# Patient Record
Sex: FEMALE | Race: WHITE | NOT HISPANIC OR LATINO | URBAN - METROPOLITAN AREA
[De-identification: names, ages, dates, MRNs, and addresses within clinical notes are randomized per-mention and may not be internally consistent; named-entity substitution may affect disease eponyms.]

---

## 2017-06-28 ENCOUNTER — OUTPATIENT (OUTPATIENT)
Dept: OUTPATIENT SERVICES | Facility: HOSPITAL | Age: 77
LOS: 1 days | Discharge: HOME | End: 2017-06-28

## 2017-06-28 DIAGNOSIS — Z02.9 ENCOUNTER FOR ADMINISTRATIVE EXAMINATIONS, UNSPECIFIED: ICD-10-CM

## 2017-06-28 DIAGNOSIS — N17.9 ACUTE KIDNEY FAILURE, UNSPECIFIED: ICD-10-CM

## 2017-06-28 DIAGNOSIS — E86.0 DEHYDRATION: ICD-10-CM

## 2017-06-28 DIAGNOSIS — I12.9 HYPERTENSIVE CHRONIC KIDNEY DISEASE WITH STAGE 1 THROUGH STAGE 4 CHRONIC KIDNEY DISEASE, OR UNSPECIFIED CHRONIC KIDNEY DISEASE: ICD-10-CM

## 2017-06-28 DIAGNOSIS — R33.9 RETENTION OF URINE, UNSPECIFIED: ICD-10-CM

## 2018-02-28 ENCOUNTER — TRANSCRIPTION ENCOUNTER (OUTPATIENT)
Age: 78
End: 2018-02-28

## 2018-06-08 ENCOUNTER — EMERGENCY (EMERGENCY)
Facility: HOSPITAL | Age: 78
LOS: 0 days | Discharge: HOME | End: 2018-06-08
Attending: EMERGENCY MEDICINE | Admitting: EMERGENCY MEDICINE

## 2018-06-08 VITALS
DIASTOLIC BLOOD PRESSURE: 82 MMHG | HEART RATE: 78 BPM | SYSTOLIC BLOOD PRESSURE: 183 MMHG | TEMPERATURE: 98 F | RESPIRATION RATE: 17 BRPM | OXYGEN SATURATION: 99 % | WEIGHT: 179.9 LBS | HEIGHT: 66 IN

## 2018-06-08 VITALS
OXYGEN SATURATION: 99 % | HEART RATE: 67 BPM | TEMPERATURE: 97 F | SYSTOLIC BLOOD PRESSURE: 196 MMHG | DIASTOLIC BLOOD PRESSURE: 77 MMHG | RESPIRATION RATE: 18 BRPM

## 2018-06-08 DIAGNOSIS — R30.0 DYSURIA: ICD-10-CM

## 2018-06-08 DIAGNOSIS — I10 ESSENTIAL (PRIMARY) HYPERTENSION: ICD-10-CM

## 2018-06-08 DIAGNOSIS — Z79.82 LONG TERM (CURRENT) USE OF ASPIRIN: ICD-10-CM

## 2018-06-08 DIAGNOSIS — Z98.890 OTHER SPECIFIED POSTPROCEDURAL STATES: ICD-10-CM

## 2018-06-08 DIAGNOSIS — R33.9 RETENTION OF URINE, UNSPECIFIED: ICD-10-CM

## 2018-06-08 DIAGNOSIS — Z79.899 OTHER LONG TERM (CURRENT) DRUG THERAPY: ICD-10-CM

## 2018-06-08 DIAGNOSIS — Z88.0 ALLERGY STATUS TO PENICILLIN: ICD-10-CM

## 2018-06-08 LAB
ALBUMIN SERPL ELPH-MCNC: 3.3 G/DL — LOW (ref 3.5–5.2)
ALP SERPL-CCNC: 93 U/L — SIGNIFICANT CHANGE UP (ref 30–115)
ALT FLD-CCNC: 8 U/L — SIGNIFICANT CHANGE UP (ref 0–41)
ANION GAP SERPL CALC-SCNC: 13 MMOL/L — SIGNIFICANT CHANGE UP (ref 7–14)
APPEARANCE UR: CLEAR — SIGNIFICANT CHANGE UP
AST SERPL-CCNC: 13 U/L — SIGNIFICANT CHANGE UP (ref 0–41)
BACTERIA # UR AUTO: (no result)
BASOPHILS # BLD AUTO: 0.03 K/UL — SIGNIFICANT CHANGE UP (ref 0–0.2)
BASOPHILS NFR BLD AUTO: 0.4 % — SIGNIFICANT CHANGE UP (ref 0–1)
BILIRUB SERPL-MCNC: <0.2 MG/DL — SIGNIFICANT CHANGE UP (ref 0.2–1.2)
BILIRUB UR-MCNC: NEGATIVE — SIGNIFICANT CHANGE UP
BUN SERPL-MCNC: 43 MG/DL — HIGH (ref 10–20)
CALCIUM SERPL-MCNC: 8.6 MG/DL — SIGNIFICANT CHANGE UP (ref 8.5–10.1)
CHLORIDE SERPL-SCNC: 101 MMOL/L — SIGNIFICANT CHANGE UP (ref 98–110)
CO2 SERPL-SCNC: 24 MMOL/L — SIGNIFICANT CHANGE UP (ref 17–32)
COD CRY URNS QL: NEGATIVE — SIGNIFICANT CHANGE UP
COLOR SPEC: YELLOW — SIGNIFICANT CHANGE UP
CREAT SERPL-MCNC: 1.3 MG/DL — SIGNIFICANT CHANGE UP (ref 0.7–1.5)
DIFF PNL FLD: (no result)
EOSINOPHIL # BLD AUTO: 0.12 K/UL — SIGNIFICANT CHANGE UP (ref 0–0.7)
EOSINOPHIL NFR BLD AUTO: 1.5 % — SIGNIFICANT CHANGE UP (ref 0–8)
EPI CELLS # UR: (no result) /HPF
GLUCOSE SERPL-MCNC: 111 MG/DL — HIGH (ref 70–99)
GLUCOSE UR QL: NEGATIVE MG/DL — SIGNIFICANT CHANGE UP
GRAN CASTS # UR COMP ASSIST: NEGATIVE — SIGNIFICANT CHANGE UP
HCT VFR BLD CALC: 32.6 % — LOW (ref 37–47)
HGB BLD-MCNC: 10.4 G/DL — LOW (ref 12–16)
HYALINE CASTS # UR AUTO: NEGATIVE — SIGNIFICANT CHANGE UP
IMM GRANULOCYTES NFR BLD AUTO: 0.2 % — SIGNIFICANT CHANGE UP (ref 0.1–0.3)
KETONES UR-MCNC: NEGATIVE — SIGNIFICANT CHANGE UP
LEUKOCYTE ESTERASE UR-ACNC: NEGATIVE — SIGNIFICANT CHANGE UP
LYMPHOCYTES # BLD AUTO: 1.36 K/UL — SIGNIFICANT CHANGE UP (ref 1.2–3.4)
LYMPHOCYTES # BLD AUTO: 16.9 % — LOW (ref 20.5–51.1)
MCHC RBC-ENTMCNC: 29.6 PG — SIGNIFICANT CHANGE UP (ref 27–31)
MCHC RBC-ENTMCNC: 31.9 G/DL — LOW (ref 32–37)
MCV RBC AUTO: 92.9 FL — SIGNIFICANT CHANGE UP (ref 81–99)
MONOCYTES # BLD AUTO: 0.79 K/UL — HIGH (ref 0.1–0.6)
MONOCYTES NFR BLD AUTO: 9.8 % — HIGH (ref 1.7–9.3)
NEUTROPHILS # BLD AUTO: 5.72 K/UL — SIGNIFICANT CHANGE UP (ref 1.4–6.5)
NEUTROPHILS NFR BLD AUTO: 71.2 % — SIGNIFICANT CHANGE UP (ref 42.2–75.2)
NITRITE UR-MCNC: NEGATIVE — SIGNIFICANT CHANGE UP
NRBC # BLD: 0 /100 WBCS — SIGNIFICANT CHANGE UP (ref 0–0)
PH UR: 6.5 — SIGNIFICANT CHANGE UP (ref 5–8)
PLATELET # BLD AUTO: 336 K/UL — SIGNIFICANT CHANGE UP (ref 130–400)
POTASSIUM SERPL-MCNC: 3.7 MMOL/L — SIGNIFICANT CHANGE UP (ref 3.5–5)
POTASSIUM SERPL-SCNC: 3.7 MMOL/L — SIGNIFICANT CHANGE UP (ref 3.5–5)
PROT SERPL-MCNC: 6.3 G/DL — SIGNIFICANT CHANGE UP (ref 6–8)
PROT UR-MCNC: NEGATIVE MG/DL — SIGNIFICANT CHANGE UP
RBC # BLD: 3.51 M/UL — LOW (ref 4.2–5.4)
RBC # FLD: 12.8 % — SIGNIFICANT CHANGE UP (ref 11.5–14.5)
RBC CASTS # UR COMP ASSIST: SIGNIFICANT CHANGE UP /HPF
SODIUM SERPL-SCNC: 138 MMOL/L — SIGNIFICANT CHANGE UP (ref 135–146)
SP GR SPEC: <=1.005 — SIGNIFICANT CHANGE UP (ref 1.01–1.03)
TRI-PHOS CRY UR QL COMP ASSIST: NEGATIVE — SIGNIFICANT CHANGE UP
URATE CRY FLD QL MICRO: NEGATIVE — SIGNIFICANT CHANGE UP
UROBILINOGEN FLD QL: 0.2 MG/DL — SIGNIFICANT CHANGE UP (ref 0.2–0.2)
WBC # BLD: 8.04 K/UL — SIGNIFICANT CHANGE UP (ref 4.8–10.8)
WBC # FLD AUTO: 8.04 K/UL — SIGNIFICANT CHANGE UP (ref 4.8–10.8)
WBC UR QL: NEGATIVE — SIGNIFICANT CHANGE UP

## 2018-06-08 RX ORDER — SODIUM CHLORIDE 9 MG/ML
3 INJECTION INTRAMUSCULAR; INTRAVENOUS; SUBCUTANEOUS EVERY 8 HOURS
Qty: 0 | Refills: 0 | Status: DISCONTINUED | OUTPATIENT
Start: 2018-06-08 | End: 2018-06-08

## 2018-06-08 NOTE — ED ADULT NURSE NOTE - PMH
H/O knee surgery    History of hip surgery    Hypertension    Mitral valve disorder    Other irritable bowel syndrome

## 2018-06-08 NOTE — ED PROVIDER NOTE - OBJECTIVE STATEMENT
urinary retention today, Was having trouble urinating several days ago, Given med by PMD help for several day ,Now unable to urinate again, No fever, no back pain, no bowel trouble,. No new meds, Had same problem several years ago and treated with brennan for 2 days an Sx resolved.

## 2018-06-08 NOTE — ED PROVIDER NOTE - NEUROLOGICAL, MLM
Alert and oriented, no focal deficits, no motor or sensory deficits. strength equal bilateral, good rectal tone

## 2018-06-09 LAB
CULTURE RESULTS: NO GROWTH — SIGNIFICANT CHANGE UP
SPECIMEN SOURCE: SIGNIFICANT CHANGE UP

## 2018-11-19 ENCOUNTER — INPATIENT (INPATIENT)
Facility: HOSPITAL | Age: 78
LOS: 6 days | Discharge: NURSING, REHAB OR LONG-TERM CARE | End: 2018-11-26
Attending: ORTHOPAEDIC SURGERY | Admitting: ORTHOPAEDIC SURGERY
Payer: MEDICARE

## 2018-11-19 VITALS
DIASTOLIC BLOOD PRESSURE: 98 MMHG | TEMPERATURE: 97 F | RESPIRATION RATE: 18 BRPM | SYSTOLIC BLOOD PRESSURE: 178 MMHG | OXYGEN SATURATION: 98 % | HEART RATE: 76 BPM

## 2018-11-19 PROBLEM — Z98.890 OTHER SPECIFIED POSTPROCEDURAL STATES: Chronic | Status: ACTIVE | Noted: 2018-06-08

## 2018-11-19 PROBLEM — I10 ESSENTIAL (PRIMARY) HYPERTENSION: Chronic | Status: ACTIVE | Noted: 2018-06-08

## 2018-11-19 PROBLEM — K58.8 OTHER IRRITABLE BOWEL SYNDROME: Chronic | Status: ACTIVE | Noted: 2018-06-08

## 2018-11-19 PROBLEM — I05.9 RHEUMATIC MITRAL VALVE DISEASE, UNSPECIFIED: Chronic | Status: ACTIVE | Noted: 2018-06-08

## 2018-11-19 LAB
ALBUMIN SERPL ELPH-MCNC: 3.6 G/DL — SIGNIFICANT CHANGE UP (ref 3.5–5.2)
ALP SERPL-CCNC: 83 U/L — SIGNIFICANT CHANGE UP (ref 30–115)
ALT FLD-CCNC: 9 U/L — SIGNIFICANT CHANGE UP (ref 0–41)
ANION GAP SERPL CALC-SCNC: 16 MMOL/L — HIGH (ref 7–14)
APTT BLD: 35 SEC — SIGNIFICANT CHANGE UP (ref 27–39.2)
AST SERPL-CCNC: 15 U/L — SIGNIFICANT CHANGE UP (ref 0–41)
BASOPHILS # BLD AUTO: 0.06 K/UL — SIGNIFICANT CHANGE UP (ref 0–0.2)
BASOPHILS NFR BLD AUTO: 0.5 % — SIGNIFICANT CHANGE UP (ref 0–1)
BILIRUB SERPL-MCNC: 0.3 MG/DL — SIGNIFICANT CHANGE UP (ref 0.2–1.2)
BLD GP AB SCN SERPL QL: SIGNIFICANT CHANGE UP
BUN SERPL-MCNC: 27 MG/DL — HIGH (ref 10–20)
CALCIUM SERPL-MCNC: 9 MG/DL — SIGNIFICANT CHANGE UP (ref 8.5–10.1)
CHLORIDE SERPL-SCNC: 100 MMOL/L — SIGNIFICANT CHANGE UP (ref 98–110)
CO2 SERPL-SCNC: 24 MMOL/L — SIGNIFICANT CHANGE UP (ref 17–32)
CREAT SERPL-MCNC: 1 MG/DL — SIGNIFICANT CHANGE UP (ref 0.7–1.5)
EOSINOPHIL # BLD AUTO: 0.06 K/UL — SIGNIFICANT CHANGE UP (ref 0–0.7)
EOSINOPHIL NFR BLD AUTO: 0.5 % — SIGNIFICANT CHANGE UP (ref 0–8)
GLUCOSE SERPL-MCNC: 115 MG/DL — HIGH (ref 70–99)
HCT VFR BLD CALC: 36 % — LOW (ref 37–47)
HGB BLD-MCNC: 11.1 G/DL — LOW (ref 12–16)
IMM GRANULOCYTES NFR BLD AUTO: 0.5 % — HIGH (ref 0.1–0.3)
INR BLD: 1.01 RATIO — SIGNIFICANT CHANGE UP (ref 0.65–1.3)
LYMPHOCYTES # BLD AUTO: 1.24 K/UL — SIGNIFICANT CHANGE UP (ref 1.2–3.4)
LYMPHOCYTES # BLD AUTO: 10.2 % — LOW (ref 20.5–51.1)
MCHC RBC-ENTMCNC: 28.3 PG — SIGNIFICANT CHANGE UP (ref 27–31)
MCHC RBC-ENTMCNC: 30.8 G/DL — LOW (ref 32–37)
MCV RBC AUTO: 91.8 FL — SIGNIFICANT CHANGE UP (ref 81–99)
MONOCYTES # BLD AUTO: 0.6 K/UL — SIGNIFICANT CHANGE UP (ref 0.1–0.6)
MONOCYTES NFR BLD AUTO: 4.9 % — SIGNIFICANT CHANGE UP (ref 1.7–9.3)
NEUTROPHILS # BLD AUTO: 10.11 K/UL — HIGH (ref 1.4–6.5)
NEUTROPHILS NFR BLD AUTO: 83.4 % — HIGH (ref 42.2–75.2)
NRBC # BLD: 0 /100 WBCS — SIGNIFICANT CHANGE UP (ref 0–0)
NT-PROBNP SERPL-SCNC: 2025 PG/ML — HIGH (ref 0–300)
PLATELET # BLD AUTO: 395 K/UL — SIGNIFICANT CHANGE UP (ref 130–400)
POTASSIUM SERPL-MCNC: 4.2 MMOL/L — SIGNIFICANT CHANGE UP (ref 3.5–5)
POTASSIUM SERPL-SCNC: 4.2 MMOL/L — SIGNIFICANT CHANGE UP (ref 3.5–5)
PROT SERPL-MCNC: 7.5 G/DL — SIGNIFICANT CHANGE UP (ref 6–8)
PROTHROM AB SERPL-ACNC: 11.6 SEC — SIGNIFICANT CHANGE UP (ref 9.95–12.87)
RBC # BLD: 3.92 M/UL — LOW (ref 4.2–5.4)
RBC # FLD: 13.4 % — SIGNIFICANT CHANGE UP (ref 11.5–14.5)
SODIUM SERPL-SCNC: 140 MMOL/L — SIGNIFICANT CHANGE UP (ref 135–146)
TYPE + AB SCN PNL BLD: SIGNIFICANT CHANGE UP
WBC # BLD: 12.13 K/UL — HIGH (ref 4.8–10.8)
WBC # FLD AUTO: 12.13 K/UL — HIGH (ref 4.8–10.8)

## 2018-11-19 PROCEDURE — 99222 1ST HOSP IP/OBS MODERATE 55: CPT

## 2018-11-19 RX ORDER — MORPHINE SULFATE 50 MG/1
4 CAPSULE, EXTENDED RELEASE ORAL ONCE
Qty: 0 | Refills: 0 | Status: DISCONTINUED | OUTPATIENT
Start: 2018-11-19 | End: 2018-11-19

## 2018-11-19 RX ORDER — HEPARIN SODIUM 5000 [USP'U]/ML
5000 INJECTION INTRAVENOUS; SUBCUTANEOUS EVERY 8 HOURS
Qty: 0 | Refills: 0 | Status: DISCONTINUED | OUTPATIENT
Start: 2018-11-19 | End: 2018-11-21

## 2018-11-19 RX ORDER — PANTOPRAZOLE SODIUM 20 MG/1
40 TABLET, DELAYED RELEASE ORAL DAILY
Qty: 0 | Refills: 0 | Status: DISCONTINUED | OUTPATIENT
Start: 2018-11-19 | End: 2018-11-21

## 2018-11-19 RX ORDER — LOSARTAN POTASSIUM 100 MG/1
50 TABLET, FILM COATED ORAL DAILY
Qty: 0 | Refills: 0 | Status: DISCONTINUED | OUTPATIENT
Start: 2018-11-19 | End: 2018-11-21

## 2018-11-19 RX ORDER — MORPHINE SULFATE 50 MG/1
2 CAPSULE, EXTENDED RELEASE ORAL EVERY 4 HOURS
Qty: 0 | Refills: 0 | Status: DISCONTINUED | OUTPATIENT
Start: 2018-11-19 | End: 2018-11-21

## 2018-11-19 RX ORDER — ACETAMINOPHEN 500 MG
650 TABLET ORAL ONCE
Qty: 0 | Refills: 0 | Status: COMPLETED | OUTPATIENT
Start: 2018-11-19 | End: 2018-11-19

## 2018-11-19 RX ORDER — IBUPROFEN 200 MG
600 TABLET ORAL EVERY 6 HOURS
Qty: 0 | Refills: 0 | Status: DISCONTINUED | OUTPATIENT
Start: 2018-11-19 | End: 2018-11-21

## 2018-11-19 RX ORDER — SODIUM CHLORIDE 9 MG/ML
1000 INJECTION INTRAMUSCULAR; INTRAVENOUS; SUBCUTANEOUS
Qty: 0 | Refills: 0 | Status: DISCONTINUED | OUTPATIENT
Start: 2018-11-19 | End: 2018-11-21

## 2018-11-19 RX ORDER — ACETAMINOPHEN 500 MG
650 TABLET ORAL EVERY 6 HOURS
Qty: 0 | Refills: 0 | Status: DISCONTINUED | OUTPATIENT
Start: 2018-11-19 | End: 2018-11-21

## 2018-11-19 RX ORDER — ATENOLOL 25 MG/1
50 TABLET ORAL DAILY
Qty: 0 | Refills: 0 | Status: DISCONTINUED | OUTPATIENT
Start: 2018-11-19 | End: 2018-11-21

## 2018-11-19 RX ORDER — LOSARTAN POTASSIUM 100 MG/1
1 TABLET, FILM COATED ORAL
Qty: 0 | Refills: 0 | COMMUNITY

## 2018-11-19 RX ORDER — HYDROCHLOROTHIAZIDE 25 MG
12.5 TABLET ORAL DAILY
Qty: 0 | Refills: 0 | Status: DISCONTINUED | OUTPATIENT
Start: 2018-11-19 | End: 2018-11-21

## 2018-11-19 RX ADMIN — MORPHINE SULFATE 4 MILLIGRAM(S): 50 CAPSULE, EXTENDED RELEASE ORAL at 13:14

## 2018-11-19 RX ADMIN — MORPHINE SULFATE 4 MILLIGRAM(S): 50 CAPSULE, EXTENDED RELEASE ORAL at 14:00

## 2018-11-19 RX ADMIN — MORPHINE SULFATE 4 MILLIGRAM(S): 50 CAPSULE, EXTENDED RELEASE ORAL at 17:06

## 2018-11-19 RX ADMIN — MORPHINE SULFATE 2 MILLIGRAM(S): 50 CAPSULE, EXTENDED RELEASE ORAL at 21:54

## 2018-11-19 RX ADMIN — SODIUM CHLORIDE 100 MILLILITER(S): 9 INJECTION INTRAMUSCULAR; INTRAVENOUS; SUBCUTANEOUS at 18:19

## 2018-11-19 RX ADMIN — HEPARIN SODIUM 5000 UNIT(S): 5000 INJECTION INTRAVENOUS; SUBCUTANEOUS at 21:00

## 2018-11-19 RX ADMIN — MORPHINE SULFATE 4 MILLIGRAM(S): 50 CAPSULE, EXTENDED RELEASE ORAL at 16:25

## 2018-11-19 RX ADMIN — Medication 650 MILLIGRAM(S): at 13:14

## 2018-11-19 RX ADMIN — Medication 650 MILLIGRAM(S): at 14:00

## 2018-11-19 NOTE — H&P ADULT - HISTORY OF PRESENT ILLNESS
79 yo F s/p fall from standing (tripped and fell) on R side, hit her R knee. No head injury, no LOC. Presented with stable vitals. R knee swollen.    PMH/PSH: b/l knee replacement, R hip replacement  ALL: PCN  Meds: ASA, losartan, chlorthalidone, atenolol, HCTZ

## 2018-11-19 NOTE — ED PROVIDER NOTE - OBJECTIVE STATEMENT
78 year old female with hx of bilateral TKA's and DEMARCUS, hypertension presenting with fall 1 hour ago. patient states she was bending over in the [employer] store when she lost her balance and fall onto her right knee. She denies chest pain, syncope, abdominal pain or headache before fall. She denies LOC, neck pain, head trauma, or the use of bloodthinners besides baby aspirin, She was brought in by ambulance with right knee splinted. Patient denies numbness, tingling, or pops when she fell. She denies other area of injury. She states she has pain above her knee and on her kneecap. She fractured her femur on the right side 2 years ago. Ortho is Dr. Dominguez. Denies hip pain or ankle pain.

## 2018-11-19 NOTE — ED PROVIDER NOTE - ATTENDING CONTRIBUTION TO CARE
78y F PMH HTN on daily ASA, prior total knee replacement, hip' replacement on right, s/p prior femur fracture, pw fall onto right knee today, with swelling, BIBEMS, no other injury in the fall. pain and swelling only to knee.   Exam: NAD, NCAT, HEENT: mmm, EOMI, PERRLA, Neck: supple, nontender, nl ROM, Heart: RRR, no murmur, Lungs: BCTA, no signs of increased WOB, Abd: NTND, no guarding or rebound, no hernia palpated, no CVAT. MSK: Right knee very swollen, ecchymotic, tender throughout proximal aspect of knee. right hip nontender, though moving leg through ROM is limited by knee area pain. Otherwise chest, back, and ext nontender, nl rom, no deformity b/l DP and PT pulses 2+ and equal. Neuro: A&Ox3, normal strength, nl sensation throughout, normal speech.   A/P: Eval for trauma sustained in fall. Pain control. Distal extremity with CMS intact at this time. Consult ortho and trauma as needed.

## 2018-11-19 NOTE — ED PROVIDER NOTE - PHYSICAL EXAMINATION
Physical Exam    Vital Signs: I have reviewed the initial vital signs.  Constitutional: well-nourished, appears stated age, no acute distress  HEENT: Conjunctiva pink, Sclera clear, PERRLA, EOMI. Mucous membranes moist, no exudates or lesions noted, uvula midline. No trismus or drooling. Non-tender lymph nodes  Cardiovascular: S1 and S2 present, regular rate, regular rhythm. radial pulses equal and 2+  Respiratory: unlabored respiratory effort, clear to auscultation bilaterally no wheezing, rales and rhonchi  Gastrointestinal: soft, non-tender abdomen, no pulsatile mass, active bowel sounds in all 4 quadrants. No guarding or rebound tenderness  Musculoskeletal: supple nontender neck, no midline tenderness. Right knee diffusely swollen with ecchymosis noted over kneecap. No TTP in lateral or medial compartment. TTP along patella and superior to knee in distal femur, Patient unable to extend of flex knee d/t pain. Ligaments intact. No TTP in ankle 5/5 strength. No hip pain with compression. Sensation intact in LE's, DP 2+  Integumentary: warm, dry, no rash  Neurologic: A & O x 3,, cranial nerves II-XII grossly intact, extremities’ motor and sensory functions grossly intact  Psychiatric: appropriate mood, appropriate affect

## 2018-11-19 NOTE — H&P ADULT - NSHPLABSRESULTS_GEN_ALL_CORE
11.1   12.13 )-----------( 395      ( 19 Nov 2018 12:12 )             36.0   11-19    140  |  100  |  27<H>  ----------------------------<  115<H>  4.2   |  24  |  1.0    Ca    9.0      19 Nov 2018 12:12    TPro  7.5  /  Alb  3.6  /  TBili  0.3  /  DBili  x   /  AST  15  /  ALT  9   /  AlkPhos  83  11-19    CXR, pelvis - no acute traumatic injuries  R knee xray -  displaced and impacted periprosthetic fracture of distal femur  R femur xray - distal femur fracture

## 2018-11-19 NOTE — H&P ADULT - ASSESSMENT
79 yo F with isolated distal periprosthetic femur fracture    Plan  Admit to surgery  NPO  IVF /h  Pain control  Hep sq  R LE arterial duplex  Plan for OR tonight with Ortho  Discussed with ED and Dr. Magdaleno

## 2018-11-19 NOTE — ED ADULT NURSE NOTE - OBJECTIVE STATEMENT
pt aox4 complaining of pain to her right leg, reports having mechanical fall at [facility] [place] today.  fell onto her right knee. bruising noted to right knee, swelling noted to right lower extremity.  pt denies dizziness before falling. denies medical problems. waiting for xray. will continue to monitor/assess

## 2018-11-19 NOTE — CONSULT NOTE ADULT - SUBJECTIVE AND OBJECTIVE BOX
78 f presents with right distal femur periprosthetic fx s/p fall today. Pt had b/l knee replacement, right knee was done in 2003 by dr Dominguez.      PAST MEDICAL & SURGICAL HISTORY:  Mitral valve disorder  Other irritable bowel syndrome  H/O knee surgery  History of hip surgery  Hypertension  No significant past surgical history      Home Medications:  Aspir 81 oral delayed release tablet: 1 tab(s) orally once a day (08 Jun 2018 14:46)  atenolol-chlorthalidone 50 mg-25 mg oral tablet: 1 tab(s) orally once a day (08 Jun 2018 14:46)  losartan-hydrochlorothiazide 50mg-12.5mg oral tablet: 1 tab(s) orally once a day (08 Jun 2018 14:46)      MEDICATIONS  (STANDING):  acetaminophen   Tablet .. 650 milliGRAM(s) Oral once  morphine  - Injectable 4 milliGRAM(s) IV Push Once    MEDICATIONS  (PRN):      Allergies    penicillin (Rash)    Intolerances        ICU Vital Signs Last 24 Hrs  T(C): 36.3 (19 Nov 2018 10:44), Max: 36.3 (19 Nov 2018 10:44)  T(F): 97.3 (19 Nov 2018 10:44), Max: 97.3 (19 Nov 2018 10:44)  HR: 76 (19 Nov 2018 10:44) (76 - 76)  BP: 178/98 (19 Nov 2018 10:44) (178/98 - 178/98)  BP(mean): --  ABP: --  ABP(mean): --  RR: 18 (19 Nov 2018 10:44) (18 - 18)  SpO2: 98% (19 Nov 2018 10:44) (98% - 98%)                              11.1   12.13 )-----------( 395      ( 19 Nov 2018 12:12 )             36.0             PT/INR - ( 19 Nov 2018 12:12 )   PT: 11.60 sec;   INR: 1.01 ratio           < from: Xray Knee 4 Views, Right (11.19.18 @ 12:10) >    Findings:  There is an acute oblique fracture through the distal femur just above   the femoral component of the knee prosthesis, with impaction and   approximately 3 cm posterior displacement. No additional fractures seen.   Bones are osteopenic.    Impression:  Displaced and impacted periprosthetic fracture of the distal femur as   above.    < end of copied text > 78 f presents with right distal femur periprosthetic fx s/p fall today. Pt had b/l knee replacement, right knee was done in 2003 by dr Dominguez. H/o right hip replacement, right proximal femur fx 2 years ago that was treated conservatively.      PAST MEDICAL & SURGICAL HISTORY:  Mitral valve disorder  Other irritable bowel syndrome  H/O knee surgery  History of hip surgery  Hypertension  No significant past surgical history      Home Medications:  Aspir 81 oral delayed release tablet: 1 tab(s) orally once a day (08 Jun 2018 14:46)  atenolol-chlorthalidone 50 mg-25 mg oral tablet: 1 tab(s) orally once a day (08 Jun 2018 14:46)  losartan-hydrochlorothiazide 50mg-12.5mg oral tablet: 1 tab(s) orally once a day (08 Jun 2018 14:46)      MEDICATIONS  (STANDING):  acetaminophen   Tablet .. 650 milliGRAM(s) Oral once  morphine  - Injectable 4 milliGRAM(s) IV Push Once    MEDICATIONS  (PRN):      Allergies    penicillin (Rash)    Intolerances      Pt seen and examined at bedside    NAD, A&O x3      ICU Vital Signs Last 24 Hrs  T(C): 36.3 (19 Nov 2018 10:44), Max: 36.3 (19 Nov 2018 10:44)  T(F): 97.3 (19 Nov 2018 10:44), Max: 97.3 (19 Nov 2018 10:44)  HR: 76 (19 Nov 2018 10:44) (76 - 76)  BP: 178/98 (19 Nov 2018 10:44) (178/98 - 178/98)  BP(mean): --  ABP: --  ABP(mean): --  RR: 18 (19 Nov 2018 10:44) (18 - 18)  SpO2: 98% (19 Nov 2018 10:44) (98% - 98%)      PE: right knee swelling, ecchymosis, tenderness, ankle/foot- motor function intact, sensation intact, foot warm, well perfused. Bulky Lopez dressing, knee immobilizer applied.                            11.1   12.13 )-----------( 395      ( 19 Nov 2018 12:12 )             36.0    11-19    140  |  100  |  27<H>  ----------------------------<  115<H>  4.2   |  24  |  1.0    Ca    9.0      19 Nov 2018 12:12    TPro  7.5  /  Alb  3.6  /  TBili  0.3  /  DBili  x   /  AST  15  /  ALT  9   /  AlkPhos  83  11-19       PT/INR - ( 19 Nov 2018 12:12 )   PT: 11.60 sec;   INR: 1.01 ratio           < from: Xray Knee 4 Views, Right (11.19.18 @ 12:10) >    Findings:  There is an acute oblique fracture through the distal femur just above   the femoral component of the knee prosthesis, with impaction and   approximately 3 cm posterior displacement. No additional fractures seen.   Bones are osteopenic.    Impression:  Displaced and impacted periprosthetic fracture of the distal femur as   above.    < end of copied text >

## 2018-11-19 NOTE — CONSULT NOTE ADULT - SUBJECTIVE AND OBJECTIVE BOX
Pt seen and examined at bed side.      Planned Procedure_ORIF______________________________________    Does the patient have the following conditions? Type Y or N    ____DVT/PE           	  Currently anticoagulated ______ IVC Filter _______ Date:______    ____DM                             Controlled    Y _______ N _______    ____HTN	                              Controlled    Y _______ N _______    ____CAD	                                  Prior MI  _____CABG _____STENT  ______ EF _____    ____CHF                             Chronic _____ Acute _____ EF ______    ____Afib	                                  Current Rhythm _________   Current anticoagulation _________    ____PPM/ICD                         Indication ___________  last Interrogated _________    ____OSA	                              PAP  Type &Settings _____________    ____Asthma/COPD	          Last Exac _______ Last Steroid use Date _______     ____O2 dependent	          Reason ______________    ____CKD or ESTEFANY	                  Stable Y _____  N ______    ____Electrolyte Abn	          Type ___________     Stable Y ______   N _______    ____Cirrhosis	                  Cause __________     Stable Y ______   N _______    ____GI Bleed                          Cause __________     Stable Y ______   N _______    ____Anemia	                          Cause __________     Stable Y ______   N _______    ____Transfusion                  Last date ________      ____ETOH Use	                  Last date________     Intervention __________________________    ____Tobacco Abuse	          Last date ________    Intervention __________________________    ____Drug Use	                 Type____________  Last dose _____ Intervention______________    ____Other                             Details_________________________________________________    Allergies    penicillin (Rash)    Intolerances      MEDICATIONS  (STANDING):    MEDICATIONS  (PRN):      Duke Activity Status Index: Can the patient climb a flight of stairs or walk uphill?   ______ N   <4 METS   _______ Y > 4 METS    Physical Exam:  Vital Signs Last 24 Hrs  T(C): 36.3 (19 Nov 2018 10:44), Max: 36.3 (19 Nov 2018 10:44)  T(F): 97.3 (19 Nov 2018 10:44), Max: 97.3 (19 Nov 2018 10:44)  HR: 76 (19 Nov 2018 13:10) (76 - 76)  BP: 191/97 (19 Nov 2018 13:10) (178/98 - 191/97)  BP(mean): --  RR: 18 (19 Nov 2018 13:10) (18 - 18)  SpO2: 100% (19 Nov 2018 13:10) (98% - 100%)    HEENT:    Chest:    Abd:    Ext:    Neuro:     LABS AND OTHER PERTINENT TESTS:                          11.1   12.13 )-----------( 395      ( 19 Nov 2018 12:12 )             36.0     11-19    140  |  100  |  27<H>  ----------------------------<  115<H>  4.2   |  24  |  1.0    Ca    9.0      19 Nov 2018 12:12    TPro  7.5  /  Alb  3.6  /  TBili  0.3  /  DBili  x   /  AST  15  /  ALT  9   /  AlkPhos  83  11-19    PT/INR - ( 19 Nov 2018 12:12 )   PT: 11.60 sec;   INR: 1.01 ratio         PTT - ( 19 Nov 2018 12:12 )  PTT:35.0 sec        Risk Assessment: (Use One)    [employer] NSQIP Surgical Risk Calculator http://riskcalculator.facs.org/      Revised Cardiac Risk Index       ________  The patient is optimized for surgery/procedure and may proceed to the operating room as scheduled    _________The patient is NOT optimized for surgery/procedure and should not proceed to the operating room as scheduled      Recommendations for optimization:          Anticoagulation Recommendations: Pt seen and examined at bed side.      Planned Procedure_ORIF______________________________________    Does the patient have the following conditions? Type Y or N    ___N_DVT/PE           	  Currently anticoagulated ______ IVC Filter _______ Date:______    __N__DM                             Controlled    Y _______ N _______    __Y__HTN	                              Controlled    Y _______ N __X_____- not currently likely because of pain    _N___CAD	                                  Prior MI  _____CABG _____STENT  ______ EF _____    _UK___CHF                             Chronic _____ Acute _____ EF ______    __N__Afib	                                  Current Rhythm _________   Current anticoagulation _________    __N__PPM/ICD                         Indication ___________  last Interrogated _________    __N__OSA	                              PAP  Type &Settings _____________    __N__Asthma/COPD	          Last Exac _______ Last Steroid use Date _______     ___N_O2 dependent	          Reason ______________    __N__CKD or ESTEFANY	                  Stable Y _____  N ______    __N__Electrolyte Abn	          Type ___________     Stable Y ______   N _______    __N__Cirrhosis	                  Cause __________     Stable Y ______   N _______    __N__GI Bleed                          Cause __________     Stable Y ______   N _______    __N__Anemia	                          Cause __________     Stable Y ______   N _______    _Y___Transfusion                  Last date _2002_______      __N__ETOH Use	                  Last date________     Intervention __________________________    __N__Tobacco Abuse	          Last date ________    Intervention ________former smoker 40 yrs ago quit__________________    __N__Drug Use	                 Type____________  Last dose _____ Intervention______________    __Y__Other                             Details_____MVP____________________________________________    Allergies    penicillin anaphylaxis     Intolerances      MEDICATIONS  (STANDING):    MEDICATIONS  (PRN):      Duke Activity Status Index: Can the patient climb a flight of stairs or walk uphill?   ______ N   <4 METS   ____x___ Y > 4 METS    Physical Exam:  Vital Signs Last 24 Hrs  T(C): 36.3 (19 Nov 2018 10:44), Max: 36.3 (19 Nov 2018 10:44)  T(F): 97.3 (19 Nov 2018 10:44), Max: 97.3 (19 Nov 2018 10:44)  HR: 76 (19 Nov 2018 13:10) (76 - 76)  BP: 191/97 (19 Nov 2018 13:10) (178/98 - 191/97)  BP(mean): --  RR: 18 (19 Nov 2018 13:10) (18 - 18)  SpO2: 100% (19 Nov 2018 13:10) (98% - 100%)    HEENT: AT, NC    Chest: CTABL    Abd: NT, ND    Ext: Pain in right lower ext, mild swelling BL    Neuro: Non focal     LABS AND OTHER PERTINENT TESTS:                          11.1   12.13 )-----------( 395      ( 19 Nov 2018 12:12 )             36.0     11-19    140  |  100  |  27<H>  ----------------------------<  115<H>  4.2   |  24  |  1.0    Ca    9.0      19 Nov 2018 12:12    TPro  7.5  /  Alb  3.6  /  TBili  0.3  /  DBili  x   /  AST  15  /  ALT  9   /  AlkPhos  83  11-19    PT/INR - ( 19 Nov 2018 12:12 )   PT: 11.60 sec;   INR: 1.01 ratio         PTT - ( 19 Nov 2018 12:12 )  PTT:35.0 sec        Risk Assessment: (Use One)    [employer] NSQIP Surgical Risk Calculator http://riskcalculator.facs.org/      Revised Cardiac Risk Index= 0,  0.4%       ________  The patient is optimized for surgery/procedure and may proceed to the operating room as scheduled    ______x___The patient is NOT optimized for surgery/procedure and should not proceed to the operating room as scheduled      Recommendations for optimization:  - Pt has lower ext swelling, hx of MVP recommending TTE  and check pBNP, if normal may proceed.  - Pt BP elevated likely 2/2 to pain, may use hydralazine to optimize if not wanting to use pt home meds for blood loss during sx Pt seen and examined at bed side.      Planned Procedure_ORIF______________________________________    Does the patient have the following conditions? Type Y or N    ___N_DVT/PE           	  Currently anticoagulated ______ IVC Filter _______ Date:______    __N__DM                             Controlled    Y _______ N _______    __Y__HTN	                              Controlled    Y _______ N __X_____- not currently likely because of pain    _N___CAD	                                  Prior MI  _____CABG _____STENT  ______ EF _____    _UK___CHF                             Chronic _____ Acute _____ EF ______    __N__Afib	                                  Current Rhythm _________   Current anticoagulation _________    __N__PPM/ICD                         Indication ___________  last Interrogated _________    __N__OSA	                              PAP  Type &Settings _____________    __N__Asthma/COPD	          Last Exac _______ Last Steroid use Date _______     ___N_O2 dependent	          Reason ______________    __N__CKD or ESTEFANY	                  Stable Y _____  N ______    __N__Electrolyte Abn	          Type ___________     Stable Y ______   N _______    __N__Cirrhosis	                  Cause __________     Stable Y ______   N _______    __N__GI Bleed                          Cause __________     Stable Y ______   N _______    __N__Anemia	                          Cause __________     Stable Y ______   N _______    _Y___Transfusion                  Last date _2002_______      __N__ETOH Use	                  Last date________     Intervention __________________________    __N__Tobacco Abuse	          Last date ________    Intervention ________former smoker 40 yrs ago quit__________________    __N__Drug Use	                 Type____________  Last dose _____ Intervention______________    __Y__Other                             Details_____MVP____________________________________________    Allergies    penicillin anaphylaxis     Intolerances      MEDICATIONS  (STANDING):    MEDICATIONS  (PRN):      Duke Activity Status Index: Can the patient climb a flight of stairs or walk uphill?   ______ N   <4 METS   ____x___ Y > 4 METS    Physical Exam:  Vital Signs Last 24 Hrs  T(C): 36.3 (19 Nov 2018 10:44), Max: 36.3 (19 Nov 2018 10:44)  T(F): 97.3 (19 Nov 2018 10:44), Max: 97.3 (19 Nov 2018 10:44)  HR: 76 (19 Nov 2018 13:10) (76 - 76)  BP: 191/97 (19 Nov 2018 13:10) (178/98 - 191/97)  BP(mean): --  RR: 18 (19 Nov 2018 13:10) (18 - 18)  SpO2: 100% (19 Nov 2018 13:10) (98% - 100%)    HEENT: AT, NC    Chest: CTABL    Abd: NT, ND    Ext: Pain in right lower ext, mild swelling BL    Neuro: Non focal     LABS AND OTHER PERTINENT TESTS:                          11.1   12.13 )-----------( 395      ( 19 Nov 2018 12:12 )             36.0     11-19    140  |  100  |  27<H>  ----------------------------<  115<H>  4.2   |  24  |  1.0    Ca    9.0      19 Nov 2018 12:12    TPro  7.5  /  Alb  3.6  /  TBili  0.3  /  DBili  x   /  AST  15  /  ALT  9   /  AlkPhos  83  11-19    PT/INR - ( 19 Nov 2018 12:12 )   PT: 11.60 sec;   INR: 1.01 ratio         PTT - ( 19 Nov 2018 12:12 )  PTT:35.0 sec        Risk Assessment: (Use One)    [employer] NSQIP Surgical Risk Calculator http://riskcalculator.facs.org/      Revised Cardiac Risk Index= 0,  0.4%       ________  The patient is optimized for surgery/procedure and may proceed to the operating room as scheduled    ______x___The patient is NOT optimized for surgery/procedure and should not proceed to the operating room as scheduled      Recommendations for optimization:  - Pt has lower ext swelling, hx of MVP recommending TTE  and check pBNP, if normal may proceed.  - Pt BP elevated likely 2/2 to pain, may use hydralazine to optimize if not wanting to use pt home meds for blood loss during sx   UPDATE  Given elevated pbnp, HTN and swelling in lower ext would recommend cardiac clearance prior to sx.

## 2018-11-19 NOTE — ED ADULT NURSE NOTE - NSIMPLEMENTINTERV_GEN_ALL_ED
Implemented All Universal Safety Interventions:  [place] to call system. Call bell, personal items and telephone within reach. Instruct patient to call for assistance. Room bathroom lighting operational. Non-slip footwear when patient is off stretcher. Physically safe environment: no spills, clutter or unnecessary equipment. Stretcher in lowest position, wheels locked, appropriate side rails in place.

## 2018-11-19 NOTE — ED PROVIDER NOTE - NS ED ROS FT
Constitutional: (-) fever (-) chills  Head: (-) trauma  EENT: (-) blurry vision,  Cardiovascular: (-) chest pain, (-) syncope  Respiratory: (-) cough, (-) shortness of breath  Gastrointestinal: (-) abdominal pain  (-) vomiting, (-) diarrhea (-) nausea   Genitourinary: (-) dysuria   Musculoskeletal: (-) neck pain, (-) back pain, (+) knee pain  Integumentary: (-) rash, (+) edema knee   Neurological: (-) headache, (-) altered mental status (-) dizziness  Allergic/Immunologic: (-) pruritus  Psych: (-) psych history

## 2018-11-19 NOTE — H&P ADULT - NSHPPHYSICALEXAM_GEN_ALL_CORE
Gen: a&ox3  Lungs: clear b/l  Abd: soft, NT, ND  Neuro: intact, GCS  R extremity: R knee swelling, tenderness, compartments are soft, palpable 2+ distal pulse

## 2018-11-19 NOTE — ED PROVIDER NOTE - PROGRESS NOTE DETAILS
Put consult out to surgery. Spoke with ortho, will see patient. Still awaiting hip xray; unable to perform d/t pain. Surgery and orthopedics consultations performed. Ortho requested CT-Scans of Femur and Knee - patient is awaiting these imaging studies. Hospitalist consult placed and hospitalist evaluated patient for medical clearance. Requesting echo - patient is there now. See consults. Trauma surgery requesting arterial duplex to assess right popliteal artery - they will follow results.

## 2018-11-19 NOTE — CONSULT NOTE ADULT - ASSESSMENT
78 y o F with right periprosthetic distal femur fx s/p fall 78 y o F with right periprosthetic distal femur fx s/p fall      -case d/w dr Velasquez  - right femur, knee CT scan  - hospitalist evaluation/ optimization for possible OR procedure today - right distal femur fx ORIF versus external fixation  - NPO   - bed rest  - pain control

## 2018-11-20 LAB — GLUCOSE BLDC GLUCOMTR-MCNC: 82 MG/DL — SIGNIFICANT CHANGE UP (ref 70–99)

## 2018-11-20 PROCEDURE — 99233 SBSQ HOSP IP/OBS HIGH 50: CPT

## 2018-11-20 RX ADMIN — MORPHINE SULFATE 2 MILLIGRAM(S): 50 CAPSULE, EXTENDED RELEASE ORAL at 01:48

## 2018-11-20 RX ADMIN — MORPHINE SULFATE 2 MILLIGRAM(S): 50 CAPSULE, EXTENDED RELEASE ORAL at 06:30

## 2018-11-20 RX ADMIN — Medication 650 MILLIGRAM(S): at 14:08

## 2018-11-20 RX ADMIN — Medication 650 MILLIGRAM(S): at 11:57

## 2018-11-20 RX ADMIN — MORPHINE SULFATE 2 MILLIGRAM(S): 50 CAPSULE, EXTENDED RELEASE ORAL at 19:09

## 2018-11-20 RX ADMIN — HEPARIN SODIUM 5000 UNIT(S): 5000 INJECTION INTRAVENOUS; SUBCUTANEOUS at 06:30

## 2018-11-20 RX ADMIN — ATENOLOL 50 MILLIGRAM(S): 25 TABLET ORAL at 05:54

## 2018-11-20 RX ADMIN — Medication 650 MILLIGRAM(S): at 23:21

## 2018-11-20 RX ADMIN — Medication 600 MILLIGRAM(S): at 14:08

## 2018-11-20 RX ADMIN — MORPHINE SULFATE 2 MILLIGRAM(S): 50 CAPSULE, EXTENDED RELEASE ORAL at 01:32

## 2018-11-20 RX ADMIN — PANTOPRAZOLE SODIUM 40 MILLIGRAM(S): 20 TABLET, DELAYED RELEASE ORAL at 11:57

## 2018-11-20 RX ADMIN — MORPHINE SULFATE 2 MILLIGRAM(S): 50 CAPSULE, EXTENDED RELEASE ORAL at 06:00

## 2018-11-20 RX ADMIN — Medication 600 MILLIGRAM(S): at 11:57

## 2018-11-20 RX ADMIN — HEPARIN SODIUM 5000 UNIT(S): 5000 INJECTION INTRAVENOUS; SUBCUTANEOUS at 22:55

## 2018-11-20 RX ADMIN — SODIUM CHLORIDE 100 MILLILITER(S): 9 INJECTION INTRAMUSCULAR; INTRAVENOUS; SUBCUTANEOUS at 05:51

## 2018-11-20 RX ADMIN — SODIUM CHLORIDE 100 MILLILITER(S): 9 INJECTION INTRAMUSCULAR; INTRAVENOUS; SUBCUTANEOUS at 19:09

## 2018-11-20 RX ADMIN — LOSARTAN POTASSIUM 50 MILLIGRAM(S): 100 TABLET, FILM COATED ORAL at 05:54

## 2018-11-20 NOTE — CONSULT NOTE ADULT - ASSESSMENT
77 yo female with pmhx of HTN, MVP, prior B/L total knee replacement (2003), Right hip replacement s/p prior femur fracture (2016), presented to ER s/p mechanical fall. XR showing isolated distal periprosthetic femur fracture requiring operative intervention. Cardiology consulted for cardiac clearance.    - Duke Activity Status Index: >4 METS  - Revised Cardiac Risk Index= 0 (0.4%)   - ECHO: EF of 67 %, G1DD, Mild mitral valve regurgitation, pulmonary artery systolic pressure is 66.4 mmHg   - Patient is at intermediate risk for procedure    CARDIOLOGY ATTENDING TO FOLLOW

## 2018-11-20 NOTE — CONSULT NOTE ADULT - SUBJECTIVE AND OBJECTIVE BOX
Patient is a 78y old  Female who presents with a chief complaint of Fall (20 Nov 2018 05:26)        PAST MEDICAL & SURGICAL HISTORY:  Mitral valve disorder  Other irritable bowel syndrome  H/O knee surgery  History of hip surgery  Hypertension  No significant past surgical history      HPI:  pt with mechanical fall with fracture of femur for surgery.               PREVIOUS DIAGNOSTIC TESTING:      ECHO  FINDINGS:    STRESS  FINDINGS:    CATHETERIZATION  FINDINGS:    ELECTROPHYSIOLOGY STUDY  FINDINGS:    CAROTID ULTRASOUND:  FINDINGS    VENOUS DUPLEX SCAN:  FINDINGS:    CHEST CT PULMONARY ANGIO with IV Contrast:  FINDINGS:  MEDICATIONS  (STANDING):  acetaminophen   Tablet .. 650 milliGRAM(s) Oral every 6 hours  ATENolol  Tablet 50 milliGRAM(s) Oral daily  heparin  Injectable 5000 Unit(s) SubCutaneous every 8 hours  hydrochlorothiazide 12.5 milliGRAM(s) Oral daily  ibuprofen  Tablet. 600 milliGRAM(s) Oral every 6 hours  losartan 50 milliGRAM(s) Oral daily  pantoprazole  Injectable 40 milliGRAM(s) IV Push daily  sodium chloride 0.9%. 1000 milliLiter(s) (100 mL/Hr) IV Continuous <Continuous>    MEDICATIONS  (PRN):  morphine  - Injectable 2 milliGRAM(s) IV Push every 4 hours PRN Severe Pain (7 - 10)      FAMILY HISTORY:  No pertinent family history in first degree relatives      SOCIAL HISTORY:    CIGARETTES:    ALCOHOL:    REVIEW OF SYSTEMS:      RESPIRATORY: SEE HPI   CARDIOVASCULAR: SEE HPI   GASTROINTESTINAL: No abdominal pain  NEUROLOGICAL: grossly intact motor and sensory   ENDOCRINE: No heat or cold intolerance, or hair loss  MUSCULOSKELETAL: No joint pain or swelling, muscle, back, or extremity pain  HEME/LYMPH: No easy bruising or bleeding gums      Vital Signs Last 24 Hrs  T(C): 36.7 (20 Nov 2018 08:03), Max: 37.1 (19 Nov 2018 16:20)  T(F): 98 (20 Nov 2018 08:03), Max: 98.7 (19 Nov 2018 16:20)  HR: 63 (20 Nov 2018 08:03) (60 - 72)  BP: 173/74 (20 Nov 2018 08:03) (160/72 - 199/76)  BP(mean): --  RR: 18 (20 Nov 2018 08:03) (18 - 18)  SpO2: 98% (20 Nov 2018 08:03) (98% - 100%)    PHYSICAL EXAM:    GENERAL: In no apparent distress, well nourished, and hydrated.  HEAD:  Atraumatic, Normocephalic  EYES: EOMI, PERRLA, conjunctiva and sclera clear  ENMT: No tonsillar erythema, exudates, or enlargement; Moist mucous membranes, Good dentition, No lesions  NECK: Supple and normal thyroid.  No JVD or carotid bruit.  Carotid pulse is 2+ bilaterally.  HEART: Regular rate and rhythm; No murmurs, rubs, or gallops.  PULMONARY: Clear to auscultation and perfusion.  No rales, wheezing, or rhonchi bilaterally.  ABDOMEN: Soft, Nontender, Nondistended; Bowel sounds present  EXTREMITIES:  2+ Peripheral Pulses, No clubbing, cyanosis, or edema  LYMPH: No lymphadenopathy noted  NEUROLOGICAL: Grossly nonfocal          INTERPRETATION OF TELEMETRY:    ECG:    I&O's Detail    19 Nov 2018 07:01  -  20 Nov 2018 07:00  --------------------------------------------------------  IN:    sodium chloride 0.9%.: 800 mL  Total IN: 800 mL    OUT:  Total OUT: 0 mL    Total NET: 800 mL          LABS:                        11.1   12.13 )-----------( 395      ( 19 Nov 2018 12:12 )             36.0     11-19    140  |  100  |  27<H>  ----------------------------<  115<H>  4.2   |  24  |  1.0    Ca    9.0      19 Nov 2018 12:12    TPro  7.5  /  Alb  3.6  /  TBili  0.3  /  DBili  x   /  AST  15  /  ALT  9   /  AlkPhos  83  11-19        PT/INR - ( 19 Nov 2018 12:12 )   PT: 11.60 sec;   INR: 1.01 ratio         PTT - ( 19 Nov 2018 12:12 )  PTT:35.0 sec    BNPSerum Pro-Brain Natriuretic Peptide: 2025 pg/mL (11-19 @ 14:13)    I&O's Detail    19 Nov 2018 07:01  -  20 Nov 2018 07:00  --------------------------------------------------------  IN:    sodium chloride 0.9%.: 800 mL  Total IN: 800 mL    OUT:  Total OUT: 0 mL    Total NET: 800 mL        Daily     Daily     RADIOLOGY & ADDITIONAL STUDIES:

## 2018-11-20 NOTE — PRE-ANESTHESIA EVALUATION ADULT - NSRADCARDRESULTSFT_GEN_ALL_CORE
Summary:   1. LV Ejection Fraction by Stanton's Method with a biplane EF of 67 %.   2. Normal left ventricular size and wall thicknesses, with normal   systolic function.   3. Spectral Doppler shows impaired relaxation pattern of left   ventricular myocardial filling (Grade I diastolic dysfunction).   4. Normal left atrial size.   5. Mild mitral valve regurgitation.   6. Mild tricuspid regurgitation.   7. PA pressure 35-40mm most views obtained.   8. Pulmonic valve regurgitation.   9. Estimated pulmonary artery systolic pressure is 66.4 mmHg assuming a   right atrial pressure of 5 mmHg, which is consistent with severe   pulmonary hypertension.  10. LA volume Index is 21.9 ml/m² ml/m2.  11. Possible small PFO present.

## 2018-11-20 NOTE — CONSULT NOTE ADULT - SUBJECTIVE AND OBJECTIVE BOX
77 yo female with pmhx of HTN, MVP, prior B/L total knee replacement (2003), Right hip replacement s/p prior femur fracture (2016), presented to ER s/p mechanical fall. As per patient, she tripped while bending over in the grocery store her balance and fell onto her right knee. She denies head or neck trauma, LOC, headache, neck pain, dizziness, chest pain, syncope, abdominal pain, numbness, tingling of the extremities.      PAST MEDICAL & SURGICAL HISTORY  Mitral valve disorder  Other irritable bowel syndrome  H/O knee surgery  History of hip surgery  Hypertension  No significant past surgical history    SOCIAL HISTORY:  Negative for smoking/alcohol/drug use.     ALLERGIES:  penicillin (Rash)    MEDICATIONS:  STANDING MEDICATIONS  acetaminophen   Tablet .. 650 milliGRAM(s) Oral every 6 hours  ATENolol  Tablet 50 milliGRAM(s) Oral daily  heparin  Injectable 5000 Unit(s) SubCutaneous every 8 hours  hydrochlorothiazide 12.5 milliGRAM(s) Oral daily  ibuprofen  Tablet. 600 milliGRAM(s) Oral every 6 hours  losartan 50 milliGRAM(s) Oral daily  pantoprazole  Injectable 40 milliGRAM(s) IV Push daily  sodium chloride 0.9%. 1000 milliLiter(s) IV Continuous <Continuous>    PRN MEDICATIONS  morphine  - Injectable 2 milliGRAM(s) IV Push every 4 hours PRN    VITALS:   T(F): 98  HR: 63  BP: 173/74  RR: 18  SpO2: 98%    LABS:                        11.1   12.13 )-----------( 395      ( 19 Nov 2018 12:12 )             36.0     11-19    140  |  100  |  27<H>  ----------------------------<  115<H>  4.2   |  24  |  1.0    Ca    9.0      19 Nov 2018 12:12    TPro  7.5  /  Alb  3.6  /  TBili  0.3  /  DBili  x   /  AST  15  /  ALT  9   /  AlkPhos  83  11-19    PT/INR - ( 19 Nov 2018 12:12 )   PT: 11.60 sec;   INR: 1.01 ratio         PTT - ( 19 Nov 2018 12:12 )  PTT:35.0 sec      RADIOLOGY:    Transthoracic Echocardiogram (11.19.18):   1. LV Ejection Fraction by Stanton's Method with a biplane EF of 67 %.   2. Normal left ventricular size and wall thicknesses, with normal systolic function.   3. Spectral Doppler shows impaired relaxation pattern of left ventricular myocardial filling (Grade I diastolic dysfunction).   4. Normal left atrial size.   5. Mild mitral valve regurgitation.   6. Mild tricuspid regurgitation.   7. PA pressure 35-40mm most views obtained.   8. Pulmonic valve regurgitation.   9. Estimated pulmonary artery systolic pressure is 66.4 mmHg assuming a right atrial pressure of 5 mmHg, which is consistent with severe   pulmonary hypertension.  10. LA volume Index is 21.9 ml/m² ml/m2.  11. Possible small PFO present.    CXR, pelvis - no acute traumatic injuries  R knee x-ray -  displaced and impacted periprosthetic fracture of distal femur  R femur x-ray - distal femur fracture    PHYSICAL EXAM:  GEN: No acute distress  LUNGS: Clear to auscultation bilaterally   HEART: S1/S2 present. RRR.   ABD: Soft, non-tender, non-distended. Bowel sounds present  EXT:  Right knee diffusely swollen and tender  NEURO: AAOX3

## 2018-11-20 NOTE — CONSULT NOTE ADULT - ASSESSMENT
h/o ? MVP   htn   echo: normal lv function   lvef of 67%   ekg; nsr with nsst changes       pt with no active cv issues, pt achieves greater than 4 mets   pt intermediate risk of perioperative cv complications and cv stable to proceed with surgery.   continue atenolol/bp meds

## 2018-11-20 NOTE — PROGRESS NOTE ADULT - SUBJECTIVE AND OBJECTIVE BOX
GENERAL SURGERY PROGRESS NOTE     AGUSTÍN TURCIOS  78y  Female  Hospital day :1d  OVERNIGHT EVENTS:  no acute events overnight, pain control.  stable, afebril    T(F): 96 (11-20-18 @ 01:00), Max: 98.7 (11-19-18 @ 16:20)  HR: 65 (11-20-18 @ 02:24) (60 - 76)  BP: 160/72 (11-20-18 @ 02:24) (160/72 - 199/76)  RR: 18 (11-20-18 @ 01:00) (18 - 18)  SpO2: 98% (11-20-18 @ 01:00) (98% - 100%)    DIET/FLUIDS: sodium chloride 0.9%. 1000 milliLiter(s) IV Continuous <Continuous>       GI proph:  pantoprazole  Injectable 40 milliGRAM(s) IV Push daily    AC/ proph: heparin  Injectable 5000 Unit(s) SubCutaneous every 8 hours    ABx:     PHYSICAL EXAM:  GENERAL: NAD   CHEST/LUNG: Clear to auscultation bilaterally  HEART: Regular rate and rhythm  ABDOMEN: Soft, Nontender, Nondistended;   EXTREMITIES:  No clubbing, cyanosis, or edema      LABS  Labs:  CAPILLARY BLOOD GLUCOSE                              11.1   12.13 )-----------( 395      ( 19 Nov 2018 12:12 )             36.0       Auto Neutrophil %: 83.4 % (11-19-18 @ 12:12)  Auto Immature Granulocyte %: 0.5 % (11-19-18 @ 12:12)    11-19    140  |  100  |  27<H>  ----------------------------<  115<H>  4.2   |  24  |  1.0      Calcium, Total Serum: 9.0 mg/dL (11-19-18 @ 12:12)      LFTs:             7.5  | 0.3  | 15       ------------------[83      ( 19 Nov 2018 12:12 )  3.6  | x    | 9                  Coags:     11.60  ----< 1.01    ( 19 Nov 2018 12:12 )     35.0              RADIOLOGY & ADDITIONAL TESTS:  < from: Xray Chest 1 View    - PORTABLE-Urgent (11.19.18 @ 14:41) >    Impression:      No radiographic evidence of acute cardiopulmonary disease.    < end of copied text >    < from: Xray Pelvis AP only (11.19.18 @ 14:40) >    INTERPRETATION:  CLINICAL INDICATION: fall    TECHNIQUE: Single frontal radiograph of the pelvis.    COMPARISON: Hip radiograph dated 7/10/2016.    INTERPRETATION: Prior total right hip arthroplasty, with surrounding   heterotopic ossification. The hardware is intact. Chronic deformity in   the proximal right femur. There is no acute fracture. There is no   dislocation. Severe degenerative changes of the left hip. The sacroiliac   joints are patent bilaterally.    IMPRESSION: No acute fracture or dislocation.      < end of copied text >      < from: Xray Knee 4 Views, Right (11.19.18 @ 12:10) >    Findings:  There is an acute oblique fracture through the distal femur just above   the femoral component of the knee prosthesis, with impaction and   approximately 3 cm posterior displacement. No additional fractures seen.   Bones are osteopenic.    Impression:  Displaced and impacted periprosthetic fracture of the distal femur as   above.    < end of copied text >

## 2018-11-21 LAB
ALLERGY+IMMUNOLOGY DIAG STUDY NOTE: SIGNIFICANT CHANGE UP
ANION GAP SERPL CALC-SCNC: 14 MMOL/L — SIGNIFICANT CHANGE UP (ref 7–14)
BASOPHILS # BLD AUTO: 0.04 K/UL — SIGNIFICANT CHANGE UP (ref 0–0.2)
BASOPHILS NFR BLD AUTO: 0.5 % — SIGNIFICANT CHANGE UP (ref 0–1)
BUN SERPL-MCNC: 20 MG/DL — SIGNIFICANT CHANGE UP (ref 10–20)
CALCIUM SERPL-MCNC: 8 MG/DL — LOW (ref 8.5–10.1)
CHLORIDE SERPL-SCNC: 104 MMOL/L — SIGNIFICANT CHANGE UP (ref 98–110)
CO2 SERPL-SCNC: 22 MMOL/L — SIGNIFICANT CHANGE UP (ref 17–32)
CREAT SERPL-MCNC: 1 MG/DL — SIGNIFICANT CHANGE UP (ref 0.7–1.5)
EOSINOPHIL # BLD AUTO: 0.17 K/UL — SIGNIFICANT CHANGE UP (ref 0–0.7)
EOSINOPHIL NFR BLD AUTO: 2.1 % — SIGNIFICANT CHANGE UP (ref 0–8)
GLUCOSE SERPL-MCNC: 83 MG/DL — SIGNIFICANT CHANGE UP (ref 70–99)
HCT VFR BLD CALC: 28.2 % — LOW (ref 37–47)
HGB BLD-MCNC: 8.8 G/DL — LOW (ref 12–16)
IMM GRANULOCYTES NFR BLD AUTO: 0.4 % — HIGH (ref 0.1–0.3)
LYMPHOCYTES # BLD AUTO: 1.69 K/UL — SIGNIFICANT CHANGE UP (ref 1.2–3.4)
LYMPHOCYTES # BLD AUTO: 20.6 % — SIGNIFICANT CHANGE UP (ref 20.5–51.1)
MAGNESIUM SERPL-MCNC: 2 MG/DL — SIGNIFICANT CHANGE UP (ref 1.8–2.4)
MCHC RBC-ENTMCNC: 28.7 PG — SIGNIFICANT CHANGE UP (ref 27–31)
MCHC RBC-ENTMCNC: 31.2 G/DL — LOW (ref 32–37)
MCV RBC AUTO: 91.9 FL — SIGNIFICANT CHANGE UP (ref 81–99)
MONOCYTES # BLD AUTO: 0.72 K/UL — HIGH (ref 0.1–0.6)
MONOCYTES NFR BLD AUTO: 8.8 % — SIGNIFICANT CHANGE UP (ref 1.7–9.3)
NEUTROPHILS # BLD AUTO: 5.56 K/UL — SIGNIFICANT CHANGE UP (ref 1.4–6.5)
NEUTROPHILS NFR BLD AUTO: 67.6 % — SIGNIFICANT CHANGE UP (ref 42.2–75.2)
NRBC # BLD: 0 /100 WBCS — SIGNIFICANT CHANGE UP (ref 0–0)
PHOSPHATE SERPL-MCNC: 3.3 MG/DL — SIGNIFICANT CHANGE UP (ref 2.1–4.9)
PLATELET # BLD AUTO: 306 K/UL — SIGNIFICANT CHANGE UP (ref 130–400)
POTASSIUM SERPL-MCNC: 3.9 MMOL/L — SIGNIFICANT CHANGE UP (ref 3.5–5)
POTASSIUM SERPL-SCNC: 3.9 MMOL/L — SIGNIFICANT CHANGE UP (ref 3.5–5)
RBC # BLD: 3.07 M/UL — LOW (ref 4.2–5.4)
RBC # FLD: 13.4 % — SIGNIFICANT CHANGE UP (ref 11.5–14.5)
SODIUM SERPL-SCNC: 140 MMOL/L — SIGNIFICANT CHANGE UP (ref 135–146)
TYPE + AB SCN PNL BLD: SIGNIFICANT CHANGE UP
WBC # BLD: 8.21 K/UL — SIGNIFICANT CHANGE UP (ref 4.8–10.8)
WBC # FLD AUTO: 8.21 K/UL — SIGNIFICANT CHANGE UP (ref 4.8–10.8)

## 2018-11-21 PROCEDURE — 99233 SBSQ HOSP IP/OBS HIGH 50: CPT

## 2018-11-21 RX ORDER — OXYCODONE AND ACETAMINOPHEN 5; 325 MG/1; MG/1
2 TABLET ORAL ONCE
Qty: 0 | Refills: 0 | Status: DISCONTINUED | OUTPATIENT
Start: 2018-11-22 | End: 2018-11-22

## 2018-11-21 RX ORDER — SODIUM CHLORIDE 9 MG/ML
1000 INJECTION, SOLUTION INTRAVENOUS
Qty: 0 | Refills: 0 | Status: DISCONTINUED | OUTPATIENT
Start: 2018-11-22 | End: 2018-11-22

## 2018-11-21 RX ORDER — ONDANSETRON 8 MG/1
4 TABLET, FILM COATED ORAL ONCE
Qty: 0 | Refills: 0 | Status: DISCONTINUED | OUTPATIENT
Start: 2018-11-22 | End: 2018-11-22

## 2018-11-21 RX ORDER — MORPHINE SULFATE 50 MG/1
2 CAPSULE, EXTENDED RELEASE ORAL
Qty: 0 | Refills: 0 | Status: DISCONTINUED | OUTPATIENT
Start: 2018-11-22 | End: 2018-11-22

## 2018-11-21 RX ORDER — HYDROMORPHONE HYDROCHLORIDE 2 MG/ML
0.5 INJECTION INTRAMUSCULAR; INTRAVENOUS; SUBCUTANEOUS
Qty: 0 | Refills: 0 | Status: DISCONTINUED | OUTPATIENT
Start: 2018-11-22 | End: 2018-11-22

## 2018-11-21 RX ADMIN — Medication 650 MILLIGRAM(S): at 12:22

## 2018-11-21 RX ADMIN — Medication 600 MILLIGRAM(S): at 05:31

## 2018-11-21 RX ADMIN — MORPHINE SULFATE 2 MILLIGRAM(S): 50 CAPSULE, EXTENDED RELEASE ORAL at 10:25

## 2018-11-21 RX ADMIN — Medication 650 MILLIGRAM(S): at 06:56

## 2018-11-21 RX ADMIN — Medication 650 MILLIGRAM(S): at 05:28

## 2018-11-21 RX ADMIN — SODIUM CHLORIDE 100 MILLILITER(S): 9 INJECTION, SOLUTION INTRAVENOUS at 23:55

## 2018-11-21 RX ADMIN — Medication 650 MILLIGRAM(S): at 17:17

## 2018-11-21 RX ADMIN — HEPARIN SODIUM 5000 UNIT(S): 5000 INJECTION INTRAVENOUS; SUBCUTANEOUS at 05:29

## 2018-11-21 RX ADMIN — Medication 600 MILLIGRAM(S): at 06:56

## 2018-11-21 RX ADMIN — SODIUM CHLORIDE 100 MILLILITER(S): 9 INJECTION, SOLUTION INTRAVENOUS at 23:56

## 2018-11-21 RX ADMIN — ATENOLOL 50 MILLIGRAM(S): 25 TABLET ORAL at 05:29

## 2018-11-21 RX ADMIN — LOSARTAN POTASSIUM 50 MILLIGRAM(S): 100 TABLET, FILM COATED ORAL at 05:28

## 2018-11-21 RX ADMIN — HEPARIN SODIUM 5000 UNIT(S): 5000 INJECTION INTRAVENOUS; SUBCUTANEOUS at 13:23

## 2018-11-21 RX ADMIN — Medication 650 MILLIGRAM(S): at 01:28

## 2018-11-21 RX ADMIN — Medication 650 MILLIGRAM(S): at 18:13

## 2018-11-21 RX ADMIN — PANTOPRAZOLE SODIUM 40 MILLIGRAM(S): 20 TABLET, DELAYED RELEASE ORAL at 12:23

## 2018-11-21 NOTE — PROGRESS NOTE ADULT - SUBJECTIVE AND OBJECTIVE BOX
Progress Note: General Surgery  Patient: AGUSTÍN TURCIOS , 78y (1940)Female   MRN: [id number]  Location: [address]  Visit: 11-19-18 Inpatient  Date: 11-21-18 @ 05:12    Procedure/Diagnosis: s/p fall from standing, -HT, -LOC, +ASA    Events/ 24h: No acute events overnight. Pain controlled.    Vitals: T(F): 97.1 (11-21-18 @ 03:00), Max: 98 (11-20-18 @ 08:03)  HR: 65 (11-21-18 @ 03:00)  BP: 145/67 (11-21-18 @ 03:00) (145/67 - 175/87)  RR: 18 (11-21-18 @ 03:00)  SpO2: 97% (11-21-18 @ 03:00)    In:   11-19-18 @ 07:01  -  11-20-18 @ 07:00  --------------------------------------------------------  IN: 800 mL      Out:   11-19-18 @ 07:01  -  11-20-18 @ 07:00  --------------------------------------------------------  OUT:  Total OUT: 0 mL        Net:   11-19-18 @ 07:01  -  11-20-18 @ 07:00  --------------------------------------------------------  NET: 800 mL        Diet: Diet, NPO:   Except Medications (11-19-18 @ 15:42)    IV Fluids: sodium chloride 0.9%. 1000 milliLiter(s) (100 mL/Hr) IV Continuous <Continuous>      Physical Examination:  General Appearance: NAD  HEENT: EOMI, sclera non-icteric.  Heart: RRR   Lungs: CTABL.   Abdomen:  Soft, nontender, nondistended.   MSK/Extremities: Warm & well-perfused. Peripheral pulses intact.  Skin: Warm, dry. No jaundice.       Medications: [Standing]  acetaminophen   Tablet .. 650 milliGRAM(s) Oral every 6 hours  ATENolol  Tablet 50 milliGRAM(s) Oral daily  heparin  Injectable 5000 Unit(s) SubCutaneous every 8 hours  hydrochlorothiazide 12.5 milliGRAM(s) Oral daily  ibuprofen  Tablet. 600 milliGRAM(s) Oral every 6 hours  losartan 50 milliGRAM(s) Oral daily  pantoprazole  Injectable 40 milliGRAM(s) IV Push daily  sodium chloride 0.9%. 1000 milliLiter(s) (100 mL/Hr) IV Continuous <Continuous>    DVT Prophylaxis: heparin  Injectable 5000 Unit(s) SubCutaneous every 8 hours    GI Prophylaxis: pantoprazole  Injectable 40 milliGRAM(s) IV Push daily    Antibiotics:   Anticoagulation:   Medications:[PRN]  morphine  - Injectable 2 milliGRAM(s) IV Push every 4 hours PRN      Labs:                        8.8    8.21  )-----------( 306      ( 21 Nov 2018 00:32 )             28.2     11-21    140  |  104  |  20  ----------------------------<  83  3.9   |  22  |  1.0    Ca    8.0<L>      21 Nov 2018 00:32  Phos  3.3     11-21  Mg     2.0     11-21    TPro  7.5  /  Alb  3.6  /  TBili  0.3  /  DBili  x   /  AST  15  /  ALT  9   /  AlkPhos  83  11-19    LIVER FUNCTIONS - ( 19 Nov 2018 12:12 )  Alb: 3.6 g/dL / Pro: 7.5 g/dL / ALK PHOS: 83 U/L / ALT: 9 U/L / AST: 15 U/L / GGT: x           PT/INR - ( 19 Nov 2018 12:12 )   PT: 11.60 sec;   INR: 1.01 ratio         PTT - ( 19 Nov 2018 12:12 )  PTT:35.0 sec      Imaging:     < from: CT Femur No Cont, Right (11.19.18 @ 17:33) >  Acute comminuted distal femoral diaphyseal hay-hardware fracture with   extension to the femoral component of the total knee arthroplasty   demonstrating 2.8 cm posteromedial displacement and 1 cm bony override.    Soft tissue swelling and small right knee lipohemarthrosis.    Chronic healed periprosthetic fracture deformity of the proximal femoral   shaft surrounding the femoral stem of the total hip arthroplasty.    < end of copied text >      Assessment:  78y Female patient admitted s/p fall from standing, -HT, -LOC, +ASA     Plan:    Cards cleared  OR with Ortho today  DVT/GI ppx  OOBAT  IS  Pain control    Date/Time: 11-21-18 @ 05:12

## 2018-11-21 NOTE — CHART NOTE - NSCHARTNOTEFT_GEN_A_CORE
PACU ANESTHESIA ADMISSION NOTE      Procedure: right femur fracture  Post op diagnosis:  ORIF right distal femur    ____  Intubated  TV:______       Rate: ______      FiO2: ______    _x___  Patent Airway    ____  Full return of protective reflexes    ____  Full recovery from anesthesia / back to baseline status    Vitals:  T(C): 36.3  HR: 65   BP: 144/66  RR: 18   SpO2: 97%    Mental Status:  _x___ Awake   _____ Alert   _____ Drowsy   _____ Sedated    Nausea/Vomiting:  ____ Yes, See Post - Op Orders      __x__ No    Pain Scale (0-10):  _____    Treatment: ____ None    __x__ See Post - Op/PCA Orders    Post - Operative Fluids:   ____ Oral   __x__ See Post - Op Orders    Plan: Discharge:   ____Home       __x___Floor     _____Critical Care    _____  Other:_________________    Comments:

## 2018-11-22 LAB
ANION GAP SERPL CALC-SCNC: 20 MMOL/L — HIGH (ref 7–14)
ANION GAP SERPL CALC-SCNC: 24 MMOL/L — HIGH (ref 7–14)
BASOPHILS # BLD AUTO: 0.03 K/UL — SIGNIFICANT CHANGE UP (ref 0–0.2)
BASOPHILS NFR BLD AUTO: 0.2 % — SIGNIFICANT CHANGE UP (ref 0–1)
BUN SERPL-MCNC: 35 MG/DL — HIGH (ref 10–20)
BUN SERPL-MCNC: 40 MG/DL — HIGH (ref 10–20)
CALCIUM SERPL-MCNC: 8.2 MG/DL — LOW (ref 8.5–10.1)
CALCIUM SERPL-MCNC: 8.2 MG/DL — LOW (ref 8.5–10.1)
CHLORIDE SERPL-SCNC: 101 MMOL/L — SIGNIFICANT CHANGE UP (ref 98–110)
CHLORIDE SERPL-SCNC: 102 MMOL/L — SIGNIFICANT CHANGE UP (ref 98–110)
CO2 SERPL-SCNC: 15 MMOL/L — LOW (ref 17–32)
CO2 SERPL-SCNC: 17 MMOL/L — SIGNIFICANT CHANGE UP (ref 17–32)
CREAT SERPL-MCNC: 2.1 MG/DL — HIGH (ref 0.7–1.5)
CREAT SERPL-MCNC: 2.4 MG/DL — HIGH (ref 0.7–1.5)
EOSINOPHIL # BLD AUTO: 0.02 K/UL — SIGNIFICANT CHANGE UP (ref 0–0.7)
EOSINOPHIL NFR BLD AUTO: 0.1 % — SIGNIFICANT CHANGE UP (ref 0–8)
GLUCOSE SERPL-MCNC: 120 MG/DL — HIGH (ref 70–99)
GLUCOSE SERPL-MCNC: 97 MG/DL — SIGNIFICANT CHANGE UP (ref 70–99)
HCT VFR BLD CALC: 26.2 % — LOW (ref 37–47)
HCT VFR BLD CALC: 28.3 % — LOW (ref 37–47)
HGB BLD-MCNC: 8.1 G/DL — LOW (ref 12–16)
HGB BLD-MCNC: 8.6 G/DL — LOW (ref 12–16)
IMM GRANULOCYTES NFR BLD AUTO: 0.4 % — HIGH (ref 0.1–0.3)
LYMPHOCYTES # BLD AUTO: 0.97 K/UL — LOW (ref 1.2–3.4)
LYMPHOCYTES # BLD AUTO: 6.6 % — LOW (ref 20.5–51.1)
MAGNESIUM SERPL-MCNC: 2 MG/DL — SIGNIFICANT CHANGE UP (ref 1.8–2.4)
MCHC RBC-ENTMCNC: 28.7 PG — SIGNIFICANT CHANGE UP (ref 27–31)
MCHC RBC-ENTMCNC: 29 PG — SIGNIFICANT CHANGE UP (ref 27–31)
MCHC RBC-ENTMCNC: 30.4 G/DL — LOW (ref 32–37)
MCHC RBC-ENTMCNC: 30.9 G/DL — LOW (ref 32–37)
MCV RBC AUTO: 93.9 FL — SIGNIFICANT CHANGE UP (ref 81–99)
MCV RBC AUTO: 94.3 FL — SIGNIFICANT CHANGE UP (ref 81–99)
MONOCYTES # BLD AUTO: 0.5 K/UL — SIGNIFICANT CHANGE UP (ref 0.1–0.6)
MONOCYTES NFR BLD AUTO: 3.4 % — SIGNIFICANT CHANGE UP (ref 1.7–9.3)
NEUTROPHILS # BLD AUTO: 13.21 K/UL — HIGH (ref 1.4–6.5)
NEUTROPHILS NFR BLD AUTO: 89.3 % — HIGH (ref 42.2–75.2)
NRBC # BLD: 0 /100 WBCS — SIGNIFICANT CHANGE UP (ref 0–0)
NRBC # BLD: 0 /100 WBCS — SIGNIFICANT CHANGE UP (ref 0–0)
PHOSPHATE SERPL-MCNC: 5.7 MG/DL — HIGH (ref 2.1–4.9)
PLATELET # BLD AUTO: 373 K/UL — SIGNIFICANT CHANGE UP (ref 130–400)
PLATELET # BLD AUTO: 397 K/UL — SIGNIFICANT CHANGE UP (ref 130–400)
POTASSIUM SERPL-MCNC: 4.6 MMOL/L — SIGNIFICANT CHANGE UP (ref 3.5–5)
POTASSIUM SERPL-MCNC: 4.8 MMOL/L — SIGNIFICANT CHANGE UP (ref 3.5–5)
POTASSIUM SERPL-SCNC: 4.6 MMOL/L — SIGNIFICANT CHANGE UP (ref 3.5–5)
POTASSIUM SERPL-SCNC: 4.8 MMOL/L — SIGNIFICANT CHANGE UP (ref 3.5–5)
RBC # BLD: 2.79 M/UL — LOW (ref 4.2–5.4)
RBC # BLD: 3 M/UL — LOW (ref 4.2–5.4)
RBC # FLD: 13.2 % — SIGNIFICANT CHANGE UP (ref 11.5–14.5)
RBC # FLD: 13.2 % — SIGNIFICANT CHANGE UP (ref 11.5–14.5)
SODIUM SERPL-SCNC: 139 MMOL/L — SIGNIFICANT CHANGE UP (ref 135–146)
SODIUM SERPL-SCNC: 140 MMOL/L — SIGNIFICANT CHANGE UP (ref 135–146)
WBC # BLD: 14.79 K/UL — HIGH (ref 4.8–10.8)
WBC # BLD: 15.83 K/UL — HIGH (ref 4.8–10.8)
WBC # FLD AUTO: 14.79 K/UL — HIGH (ref 4.8–10.8)
WBC # FLD AUTO: 15.83 K/UL — HIGH (ref 4.8–10.8)

## 2018-11-22 RX ORDER — HYDROCHLOROTHIAZIDE 25 MG
12.5 TABLET ORAL DAILY
Qty: 0 | Refills: 0 | Status: DISCONTINUED | OUTPATIENT
Start: 2018-11-22 | End: 2018-11-26

## 2018-11-22 RX ORDER — HEPARIN SODIUM 5000 [USP'U]/ML
5000 INJECTION INTRAVENOUS; SUBCUTANEOUS EVERY 8 HOURS
Qty: 0 | Refills: 0 | Status: DISCONTINUED | OUTPATIENT
Start: 2018-11-22 | End: 2018-11-24

## 2018-11-22 RX ORDER — LOSARTAN POTASSIUM 100 MG/1
50 TABLET, FILM COATED ORAL DAILY
Qty: 0 | Refills: 0 | Status: DISCONTINUED | OUTPATIENT
Start: 2018-11-22 | End: 2018-11-26

## 2018-11-22 RX ORDER — ATENOLOL 25 MG/1
50 TABLET ORAL DAILY
Qty: 0 | Refills: 0 | Status: DISCONTINUED | OUTPATIENT
Start: 2018-11-22 | End: 2018-11-26

## 2018-11-22 RX ORDER — ACETAMINOPHEN 500 MG
650 TABLET ORAL EVERY 6 HOURS
Qty: 0 | Refills: 0 | Status: DISCONTINUED | OUTPATIENT
Start: 2018-11-22 | End: 2018-11-26

## 2018-11-22 RX ORDER — OXYCODONE AND ACETAMINOPHEN 5; 325 MG/1; MG/1
1 TABLET ORAL EVERY 4 HOURS
Qty: 0 | Refills: 0 | Status: DISCONTINUED | OUTPATIENT
Start: 2018-11-22 | End: 2018-11-26

## 2018-11-22 RX ORDER — PANTOPRAZOLE SODIUM 20 MG/1
40 TABLET, DELAYED RELEASE ORAL DAILY
Qty: 0 | Refills: 0 | Status: DISCONTINUED | OUTPATIENT
Start: 2018-11-22 | End: 2018-11-26

## 2018-11-22 RX ORDER — SODIUM CHLORIDE 9 MG/ML
1000 INJECTION INTRAMUSCULAR; INTRAVENOUS; SUBCUTANEOUS
Qty: 0 | Refills: 0 | Status: DISCONTINUED | OUTPATIENT
Start: 2018-11-22 | End: 2018-11-22

## 2018-11-22 RX ADMIN — Medication 650 MILLIGRAM(S): at 17:15

## 2018-11-22 RX ADMIN — OXYCODONE AND ACETAMINOPHEN 1 TABLET(S): 5; 325 TABLET ORAL at 05:26

## 2018-11-22 RX ADMIN — Medication 650 MILLIGRAM(S): at 05:17

## 2018-11-22 RX ADMIN — Medication 12.5 MILLIGRAM(S): at 05:17

## 2018-11-22 RX ADMIN — ATENOLOL 50 MILLIGRAM(S): 25 TABLET ORAL at 05:17

## 2018-11-22 RX ADMIN — Medication 650 MILLIGRAM(S): at 23:30

## 2018-11-22 RX ADMIN — Medication 650 MILLIGRAM(S): at 23:00

## 2018-11-22 RX ADMIN — HEPARIN SODIUM 5000 UNIT(S): 5000 INJECTION INTRAVENOUS; SUBCUTANEOUS at 21:47

## 2018-11-22 RX ADMIN — HEPARIN SODIUM 5000 UNIT(S): 5000 INJECTION INTRAVENOUS; SUBCUTANEOUS at 05:18

## 2018-11-22 RX ADMIN — PANTOPRAZOLE SODIUM 40 MILLIGRAM(S): 20 TABLET, DELAYED RELEASE ORAL at 12:25

## 2018-11-22 RX ADMIN — Medication 650 MILLIGRAM(S): at 12:25

## 2018-11-22 RX ADMIN — LOSARTAN POTASSIUM 50 MILLIGRAM(S): 100 TABLET, FILM COATED ORAL at 05:17

## 2018-11-22 RX ADMIN — Medication 100 MILLIGRAM(S): at 05:17

## 2018-11-22 RX ADMIN — Medication 100 MILLIGRAM(S): at 13:33

## 2018-11-22 RX ADMIN — HEPARIN SODIUM 5000 UNIT(S): 5000 INJECTION INTRAVENOUS; SUBCUTANEOUS at 13:32

## 2018-11-22 NOTE — PROVIDER CONTACT NOTE (MEDICATION) - SITUATION
I received in sign out that the patient is voiding. pt is noted to be forgetful at times. pt stated she has not voided since after surgery.

## 2018-11-22 NOTE — BRIEF OPERATIVE NOTE - POST-OP DX
Closed displaced supracondylar fracture of distal end of right femur without intracondylar extension, initial encounter  11/22/2018  about total knee and total hip prostheses  Active  Pelon Velasquez

## 2018-11-22 NOTE — PROGRESS NOTE ADULT - SUBJECTIVE AND OBJECTIVE BOX
Patient seen and examined at bedside in the am. No acute events overnight. Resting comfortably in bed with daughter at bedside. Pain well controlled. No complaints at this time.     Vital Signs Last 24 Hrs  T(C): 36.4 (22 Nov 2018 07:35), Max: 36.5 (21 Nov 2018 23:55)  T(F): 97.6 (22 Nov 2018 07:35), Max: 97.7 (21 Nov 2018 23:55)  HR: 62 (22 Nov 2018 07:35) (52 - 70)  BP: 107/55 (22 Nov 2018 07:35) (107/55 - 144/66)  BP(mean): 88 (22 Nov 2018 01:27) (88 - 95)  RR: 18 (22 Nov 2018 07:35) (16 - 36)  SpO2: 100% (22 Nov 2018 01:27) (96% - 100%)    Physical exam:  NAD,AOx3  Non-labored breathing  Dressing C/D/I  Compartments soft compressible  EHL/FHL/TA/GA Motor intact  SP/DP/T/S/S SILT distally   Toes WWP     MEDICATIONS  (STANDING):  acetaminophen   Tablet .. 650 milliGRAM(s) Oral every 6 hours  ATENolol  Tablet 50 milliGRAM(s) Oral daily  clindamycin IVPB 900 milliGRAM(s) IV Intermittent every 8 hours  heparin  Injectable 5000 Unit(s) SubCutaneous every 8 hours  hydrochlorothiazide 12.5 milliGRAM(s) Oral daily  losartan 50 milliGRAM(s) Oral daily  pantoprazole  Injectable 40 milliGRAM(s) IV Push daily  sodium chloride 0.9%. 1000 milliLiter(s) (100 mL/Hr) IV Continuous <Continuous>      MEDICATIONS  (PRN):  oxyCODONE    5 mG/acetaminophen 325 mG 1 Tablet(s) Oral every 4 hours PRN Severe Pain (7 - 10)                            8.6    14.79 )-----------( 397      ( 22 Nov 2018 01:36 )             28.3   11-22      140  |  101  |  40<H>  ----------------------------<  120<H>  4.8   |  15<L>  |  2.4<H>    Ca    8.2<L>      22 Nov 2018 06:19  Phos  5.7     11-22  Mg     2.0     11-22      A/P:   78F POD#1 s/p right distal femur fracture      -DVT ppx   -Enema/lactulose prn daily for constipation    -OOB to Chair   -Physical Therapy  -NWB on RLE  -Pain control   -Incentive Spirometry   -DVT Prophylaxis

## 2018-11-22 NOTE — BRIEF OPERATIVE NOTE - PROCEDURE
<<-----Click on this checkbox to enter Procedure Open reduction and internal fixation (ORIF) of fracture of right distal femur using locking plate  11/22/2018  about total knee and total hip prostheses; CPT code 19241-53047 (modifed because of complexity of fixing fracture about prosthesis, akin to ORIF supracondylar femur fracture with intntracondylar extension, CPT code 88514)  Active  [email or web address] Open reduction and internal fixation (ORIF) of fracture of right distal femur using locking plate  11/22/2018  about total knee and total hip prostheses; CPT code 66022-33801 (modified because of complexity of fixing fracture about prosthesis, akin to ORIF supracondylar femur fracture with intntracondylar extension, CPT code 89953)  Active  Pelon Velasquez

## 2018-11-22 NOTE — CHART NOTE - NSCHARTNOTEFT_GEN_A_CORE
Pt is a 78yF presented as Level 2 trauma consult s/p mechanical fall from standing on R side, hit her R knee. No head injury, no LOC. Presented with stable vitals. R knee swollen.    PMH/PSH: b/l knee replacement, R hip replacement  ALL: PCN  Meds: ASA, losartan, chlorthalidone, atenolol, HCTZ    Imaging showed R periprosthetic isolated femur fx.    Cleared by cardiology, intermediate risk.    Underwent ORIF with ortho of right distal femur using locking plate overnight.    MD Whitfield and MD Velasquez aware and accept.

## 2018-11-22 NOTE — PROVIDER CONTACT NOTE (MEDICATION) - ASSESSMENT
pt is not distended and her abdomen is not tender to touch. I bladder scanned the patient and 171cc was in her bladder. I am encouraging PO fluids.

## 2018-11-22 NOTE — BRIEF OPERATIVE NOTE - OPERATION/FINDINGS
comminuted distal femur frature about prior TKA and total hip  psot op- NWB x 10-12 weeks, Abx and DVT ppx per protocol  Dict:  Implants: Synthes 18 hole condylar buttress plate secured with 6 x 5.0 cannulated locking screws, 6 x 5.0 solid locking screws and 6 x 5.0 solide periprosthetic screws comminuted distal femur frature about prior TKA and total hip  psot op- NWB x 10-12 weeks, Abx and DVT ppx per protocol  Dict:[id number]  Implants: Synthes 18 hole condylar buttress plate secured with 6 x 5.0 cannulated locking screws, 6 x 5.0 solid locking screws and 6 x 5.0 solid periprosthetic screws

## 2018-11-22 NOTE — PROVIDER CONTACT NOTE (OTHER) - SITUATION
can you please place an activity order for patient. Rn offered to get pt oob to chair, but pt refused.

## 2018-11-23 LAB
ANION GAP SERPL CALC-SCNC: 16 MMOL/L — HIGH (ref 7–14)
APPEARANCE UR: ABNORMAL
BILIRUB UR-MCNC: NEGATIVE — SIGNIFICANT CHANGE UP
BUN SERPL-MCNC: 56 MG/DL — HIGH (ref 10–20)
CALCIUM SERPL-MCNC: 7.8 MG/DL — LOW (ref 8.5–10.1)
CHLORIDE SERPL-SCNC: 93 MMOL/L — LOW (ref 98–110)
CO2 SERPL-SCNC: 21 MMOL/L — SIGNIFICANT CHANGE UP (ref 17–32)
COLOR SPEC: SIGNIFICANT CHANGE UP
CREAT SERPL-MCNC: 3.3 MG/DL — HIGH (ref 0.7–1.5)
DIFF PNL FLD: ABNORMAL
GLUCOSE SERPL-MCNC: 113 MG/DL — HIGH (ref 70–99)
GLUCOSE UR QL: NEGATIVE MG/DL — SIGNIFICANT CHANGE UP
KETONES UR-MCNC: NEGATIVE — SIGNIFICANT CHANGE UP
LACTATE SERPL-SCNC: 1.8 MMOL/L — SIGNIFICANT CHANGE UP (ref 0.5–2.2)
LEUKOCYTE ESTERASE UR-ACNC: ABNORMAL
NITRITE UR-MCNC: NEGATIVE — SIGNIFICANT CHANGE UP
PH UR: 6 — SIGNIFICANT CHANGE UP (ref 5–8)
POTASSIUM SERPL-MCNC: 4.9 MMOL/L — SIGNIFICANT CHANGE UP (ref 3.5–5)
POTASSIUM SERPL-SCNC: 4.9 MMOL/L — SIGNIFICANT CHANGE UP (ref 3.5–5)
PROT UR-MCNC: 30 MG/DL
SODIUM SERPL-SCNC: 130 MMOL/L — LOW (ref 135–146)
SP GR SPEC: 1.01 — SIGNIFICANT CHANGE UP (ref 1.01–1.03)
UROBILINOGEN FLD QL: 0.2 MG/DL — SIGNIFICANT CHANGE UP (ref 0.2–0.2)

## 2018-11-23 RX ORDER — LACTULOSE 10 G/15ML
10 SOLUTION ORAL ONCE
Qty: 0 | Refills: 0 | Status: COMPLETED | OUTPATIENT
Start: 2018-11-23 | End: 2018-11-23

## 2018-11-23 RX ORDER — SENNA PLUS 8.6 MG/1
2 TABLET ORAL
Qty: 0 | Refills: 0 | Status: DISCONTINUED | OUTPATIENT
Start: 2018-11-23 | End: 2018-11-26

## 2018-11-23 RX ORDER — POLYETHYLENE GLYCOL 3350 17 G/17G
17 POWDER, FOR SOLUTION ORAL
Qty: 0 | Refills: 0 | Status: DISCONTINUED | OUTPATIENT
Start: 2018-11-23 | End: 2018-11-26

## 2018-11-23 RX ORDER — DOCUSATE SODIUM 100 MG
100 CAPSULE ORAL THREE TIMES A DAY
Qty: 0 | Refills: 0 | Status: DISCONTINUED | OUTPATIENT
Start: 2018-11-23 | End: 2018-11-26

## 2018-11-23 RX ORDER — SIMETHICONE 80 MG/1
80 TABLET, CHEWABLE ORAL EVERY 6 HOURS
Qty: 0 | Refills: 0 | Status: DISCONTINUED | OUTPATIENT
Start: 2018-11-23 | End: 2018-11-26

## 2018-11-23 RX ORDER — SODIUM CHLORIDE 9 MG/ML
1000 INJECTION INTRAMUSCULAR; INTRAVENOUS; SUBCUTANEOUS
Qty: 0 | Refills: 0 | Status: DISCONTINUED | OUTPATIENT
Start: 2018-11-23 | End: 2018-11-26

## 2018-11-23 RX ORDER — IOHEXOL 300 MG/ML
30 INJECTION, SOLUTION INTRAVENOUS ONCE
Qty: 0 | Refills: 0 | Status: COMPLETED | OUTPATIENT
Start: 2018-11-23 | End: 2018-11-23

## 2018-11-23 RX ADMIN — SIMETHICONE 80 MILLIGRAM(S): 80 TABLET, CHEWABLE ORAL at 11:03

## 2018-11-23 RX ADMIN — LOSARTAN POTASSIUM 50 MILLIGRAM(S): 100 TABLET, FILM COATED ORAL at 05:37

## 2018-11-23 RX ADMIN — SODIUM CHLORIDE 100 MILLILITER(S): 9 INJECTION INTRAMUSCULAR; INTRAVENOUS; SUBCUTANEOUS at 08:54

## 2018-11-23 RX ADMIN — Medication 100 MILLIGRAM(S): at 21:44

## 2018-11-23 RX ADMIN — IOHEXOL 30 MILLILITER(S): 300 INJECTION, SOLUTION INTRAVENOUS at 17:26

## 2018-11-23 RX ADMIN — PANTOPRAZOLE SODIUM 40 MILLIGRAM(S): 20 TABLET, DELAYED RELEASE ORAL at 11:09

## 2018-11-23 RX ADMIN — SIMETHICONE 80 MILLIGRAM(S): 80 TABLET, CHEWABLE ORAL at 17:02

## 2018-11-23 RX ADMIN — HEPARIN SODIUM 5000 UNIT(S): 5000 INJECTION INTRAVENOUS; SUBCUTANEOUS at 13:32

## 2018-11-23 RX ADMIN — POLYETHYLENE GLYCOL 3350 17 GRAM(S): 17 POWDER, FOR SOLUTION ORAL at 17:44

## 2018-11-23 RX ADMIN — SENNA PLUS 2 TABLET(S): 8.6 TABLET ORAL at 17:44

## 2018-11-23 RX ADMIN — HEPARIN SODIUM 5000 UNIT(S): 5000 INJECTION INTRAVENOUS; SUBCUTANEOUS at 21:44

## 2018-11-23 RX ADMIN — Medication 650 MILLIGRAM(S): at 05:38

## 2018-11-23 RX ADMIN — SODIUM CHLORIDE 100 MILLILITER(S): 9 INJECTION INTRAMUSCULAR; INTRAVENOUS; SUBCUTANEOUS at 19:07

## 2018-11-23 RX ADMIN — Medication 650 MILLIGRAM(S): at 11:03

## 2018-11-23 RX ADMIN — Medication 650 MILLIGRAM(S): at 06:11

## 2018-11-23 RX ADMIN — ATENOLOL 50 MILLIGRAM(S): 25 TABLET ORAL at 05:38

## 2018-11-23 RX ADMIN — Medication 12.5 MILLIGRAM(S): at 05:37

## 2018-11-23 RX ADMIN — LACTULOSE 10 GRAM(S): 10 SOLUTION ORAL at 08:54

## 2018-11-23 RX ADMIN — Medication 650 MILLIGRAM(S): at 17:02

## 2018-11-23 RX ADMIN — HEPARIN SODIUM 5000 UNIT(S): 5000 INJECTION INTRAVENOUS; SUBCUTANEOUS at 05:38

## 2018-11-23 NOTE — PROVIDER CONTACT NOTE (OTHER) - ACTION/TREATMENT ORDERED:
I will place an order for oob to chair.
Nikkie spoke to pts daughter over the phone and answered questions she had.
No I will D/C order. you can take down fluids
Ok will put in Simethicone
we will give pt more time. place her on bed pan and see if she can go. if she gets uncomfortable brennan must go in. please call me back when needed.
MD says it is okay and blood may be from brennan trauma. Will encourage oral fluids and cont to monitor urine output
MD will come see pt soon

## 2018-11-23 NOTE — PROVIDER CONTACT NOTE (OTHER) - ASSESSMENT
Daughter concerned about urine output. Has questions for the MD.
Pt given Lactulose, c/o of gas pain.
pt is not distended, but claims she feels pressure when I push on her bladder and states that she has the urge to go.
Pain on palpation of b/l groin
Pt states cramping in lower abd is decreased but not gone. Still urine output is dark

## 2018-11-23 NOTE — CONSULT NOTE ADULT - SUBJECTIVE AND OBJECTIVE BOX
General Surgery Consultation Note  =====================================================  HPI: 78y Female  HPI:  79 yo F s/p fall from standing (tripped and fell) on R side, hit her R knee. No head injury, no LOC. Presented with stable vitals. R knee swollen.    PMH/PSH: b/l knee replacement, R hip replacement  ALL: PCN  Meds: ASA, losartan, chlorthalidone, atenolol, HCTZ (19 Nov 2018 15:06)      Surgery Information  OR time:      EBL:          IV Fluids:       Blood Products:   UOP:        PAST MEDICAL & SURGICAL HISTORY:  Mitral valve disorder  Other irritable bowel syndrome  H/O knee surgery  History of hip surgery  Hypertension  No significant past surgical history    Home Meds: Home Medications:  Aspir 81 oral delayed release tablet: 1 tab(s) orally once a day (19 Nov 2018 15:42)  atenolol-chlorthalidone 50 mg-25 mg oral tablet: 1 tab(s) orally once a day (19 Nov 2018 15:42)  losartan-hydrochlorothiazide 50mg-12.5mg oral tablet: 1 tab(s) orally once a day (19 Nov 2018 15:42)    Allergies: Allergies    penicillin (Rash)    Intolerances      Soc:   Advanced Directives: Presumed Full Code     ROS:    REVIEW OF SYSTEMS    [ ] A ten-point review of systems was otherwise negative except as noted.  [ ] Due to altered mental status/intubation, subjective information were not able to be obtained from the patient. History was obtained, to the extent possible, from review of the chart and collateral sources of information.      CURRENT MEDICATIONS:   --------------------------------------------------------------------------------------  Neurologic Medications  acetaminophen   Tablet .. 650 milliGRAM(s) Oral every 6 hours  oxyCODONE    5 mG/acetaminophen 325 mG 1 Tablet(s) Oral every 4 hours PRN Severe Pain (7 - 10)    Respiratory Medications    Cardiovascular Medications  ATENolol  Tablet 50 milliGRAM(s) Oral daily  hydrochlorothiazide 12.5 milliGRAM(s) Oral daily  losartan 50 milliGRAM(s) Oral daily    Gastrointestinal Medications  pantoprazole  Injectable 40 milliGRAM(s) IV Push daily  simethicone 80 milliGRAM(s) Chew every 6 hours  sodium chloride 0.9%. 1000 milliLiter(s) IV Continuous <Continuous>    Genitourinary Medications    Hematologic/Oncologic Medications  heparin  Injectable 5000 Unit(s) SubCutaneous every 8 hours    Antimicrobial/Immunologic Medications    Endocrine/Metabolic Medications    Topical/Other Medications    --------------------------------------------------------------------------------------    VITAL SIGNS, INS/OUTS (last 24 hours):  --------------------------------------------------------------------------------------  ICU Vital Signs Last 24 Hrs  T(C): 35.4 (23 Nov 2018 07:08), Max: 36.3 (22 Nov 2018 16:00)  T(F): 95.8 (23 Nov 2018 07:08), Max: 97.4 (22 Nov 2018 16:00)  HR: 64 (23 Nov 2018 07:08) (58 - 64)  BP: 122/59 (23 Nov 2018 07:08) (103/51 - 122/59)  BP(mean): --  ABP: --  ABP(mean): --  RR: 18 (23 Nov 2018 07:08) (18 - 18)  SpO2: --    I&O's Summary    22 Nov 2018 07:01  -  23 Nov 2018 07:00  --------------------------------------------------------  IN: 480 mL / OUT: 600 mL / NET: -120 mL    23 Nov 2018 07:01  -  23 Nov 2018 15:50  --------------------------------------------------------  IN: 590 mL / OUT: 1200 mL / NET: -610 mL      --------------------------------------------------------------------------------------  PHYSICAL EXAM    General: NAD, AAOx3, calm and cooperative  HEENT: NCAT, VERONA, EOMI, Trachea ML, Neck supple  Cardiac: RRR S1, S2, no Murmurs, rubs or gallops  Respiratory: CTAB, normal respiratory effort, breath sounds equal BL, no wheeze, rhonchi or crackles  Abdomen: Soft, non-distended, non-tender, +bowel sounds  Musculoskeletal: Strength 5/5 BL UE/LE, ROM intact, compartments soft  Neuro: Sensation grossly intact and equal throughout, CN II-XII intact, no focal deficits  Vascular: Pulses 2+ throughout, extremities well perfused  Skin: Warm/dry, normal color, no jaundice  Incision/wound: healing well, dressings in place, clean, dry and intact    LABS  --------------------------------------------------------------------------------------  Labs:  CAPILLARY BLOOD GLUCOSE                              8.6    14.79 )-----------( 397      ( 22 Nov 2018 01:36 )             28.3         11-23    130<L>  |  93<L>  |  56<H>  ----------------------------<  113<H>  4.9   |  21  |  3.3<H>      Calcium, Total Serum: 7.8 mg/dL (11-23-18 @ 08:18)      LFTs:         Coags:              --------------------------------------------------------------------------------------    OTHER LABS    IMAGING RESULTS    --------------------------------------------------------------------------------------- General Surgery Consultation Note  =====================================================  HPI: 78y Female PMH mitral valve disease (mild MV regurg on echo as well as pulmonic and tricuspid regurgitation and severe pulmonary HTN), IBS, HTN, and h/o BL knee and right hip replacements admitted after level 2 trauma mechanical fall from standing onto right side and right knee with right femur fracture, now s/p     PMH/PSH: b/l knee replacement, R hip replacement  ALL: PCN  Meds: ASA, losartan, chlorthalidone, atenolol, HCTZ (19 Nov 2018 15:06)        PAST MEDICAL & SURGICAL HISTORY:  Mitral valve disorder  Other irritable bowel syndrome  H/O knee surgery  History of hip surgery  Hypertension  No significant past surgical history    Home Meds: Home Medications:  Aspir 81 oral delayed release tablet: 1 tab(s) orally once a day (19 Nov 2018 15:42)  atenolol-chlorthalidone 50 mg-25 mg oral tablet: 1 tab(s) orally once a day (19 Nov 2018 15:42)  losartan-hydrochlorothiazide 50mg-12.5mg oral tablet: 1 tab(s) orally once a day (19 Nov 2018 15:42)    Allergies: Allergies    penicillin (Rash)    Intolerances      Soc:   Advanced Directives: Presumed Full Code     ROS:    REVIEW OF SYSTEMS    [ ] A ten-point review of systems was otherwise negative except as noted.  [ ] Due to altered mental status/intubation, subjective information were not able to be obtained from the patient. History was obtained, to the extent possible, from review of the chart and collateral sources of information.      CURRENT MEDICATIONS:   --------------------------------------------------------------------------------------  Neurologic Medications  acetaminophen   Tablet .. 650 milliGRAM(s) Oral every 6 hours  oxyCODONE    5 mG/acetaminophen 325 mG 1 Tablet(s) Oral every 4 hours PRN Severe Pain (7 - 10)    Respiratory Medications    Cardiovascular Medications  ATENolol  Tablet 50 milliGRAM(s) Oral daily  hydrochlorothiazide 12.5 milliGRAM(s) Oral daily  losartan 50 milliGRAM(s) Oral daily    Gastrointestinal Medications  pantoprazole  Injectable 40 milliGRAM(s) IV Push daily  simethicone 80 milliGRAM(s) Chew every 6 hours  sodium chloride 0.9%. 1000 milliLiter(s) IV Continuous <Continuous>    Genitourinary Medications    Hematologic/Oncologic Medications  heparin  Injectable 5000 Unit(s) SubCutaneous every 8 hours    Antimicrobial/Immunologic Medications    Endocrine/Metabolic Medications    Topical/Other Medications    --------------------------------------------------------------------------------------    VITAL SIGNS, INS/OUTS (last 24 hours):  --------------------------------------------------------------------------------------  ICU Vital Signs Last 24 Hrs  T(C): 35.4 (23 Nov 2018 07:08), Max: 36.3 (22 Nov 2018 16:00)  T(F): 95.8 (23 Nov 2018 07:08), Max: 97.4 (22 Nov 2018 16:00)  HR: 64 (23 Nov 2018 07:08) (58 - 64)  BP: 122/59 (23 Nov 2018 07:08) (103/51 - 122/59)  BP(mean): --  ABP: --  ABP(mean): --  RR: 18 (23 Nov 2018 07:08) (18 - 18)  SpO2: --    I&O's Summary    22 Nov 2018 07:01  -  23 Nov 2018 07:00  --------------------------------------------------------  IN: 480 mL / OUT: 600 mL / NET: -120 mL    23 Nov 2018 07:01  -  23 Nov 2018 15:50  --------------------------------------------------------  IN: 590 mL / OUT: 1200 mL / NET: -610 mL      --------------------------------------------------------------------------------------  PHYSICAL EXAM    General: NAD, AAOx3, calm and cooperative  HEENT: NCAT, VERONA, EOMI, Trachea ML, Neck supple  Cardiac: RRR S1, S2, no Murmurs, rubs or gallops  Respiratory: CTAB, normal respiratory effort, breath sounds equal BL, no wheeze, rhonchi or crackles  Abdomen: Soft, non-distended, non-tender, +bowel sounds  Musculoskeletal: Strength 5/5 BL UE/LE, ROM intact, compartments soft  Neuro: Sensation grossly intact and equal throughout, CN II-XII intact, no focal deficits  Vascular: Pulses 2+ throughout, extremities well perfused  Skin: Warm/dry, normal color, no jaundice  Incision/wound: healing well, dressings in place, clean, dry and intact    LABS  --------------------------------------------------------------------------------------  Labs:  CAPILLARY BLOOD GLUCOSE                              8.6    14.79 )-----------( 397      ( 22 Nov 2018 01:36 )             28.3         11-23    130<L>  |  93<L>  |  56<H>  ----------------------------<  113<H>  4.9   |  21  |  3.3<H>      Calcium, Total Serum: 7.8 mg/dL (11-23-18 @ 08:18)      LFTs:         Coags:              --------------------------------------------------------------------------------------    OTHER LABS    IMAGING RESULTS    --------------------------------------------------------------------------------------- General Surgery Consultation Note  =====================================================  HPI: 78y Female PMH mitral valve disease (mild MV regurg on echo as well as pulmonic and tricuspid regurgitation and severe pulmonary HTN), IBS, HTN, and h/o BL knee and right hip replacements admitted after level 2 trauma mechanical fall from standing onto right side and right knee with right femur fracture, now s/p R distal femur ORIF with locking plate about total knee and hip replacement with Dr. Velasquez overnight 11/21 into 11/22.   Patient has developed cramping and bloating abdominal pain with increased flatus today, no bowel movements x4 days in setting of IBS history. She states she feels bloated, and that pain has slightly improved with increased flatus. She has       PAST MEDICAL & SURGICAL HISTORY:  Mitral valve disorder- echo with MVR  irritable bowel syndrome- diarrheal, takes immodium daily  HTN  urinary retention postop   BL knee replacement  History of R hip replacement        Home Meds: Home Medications:  Aspir 81 oral delayed release tablet: 1 tab(s) orally once a day (19 Nov 2018 15:42)  atenolol  losartan  immodium     Allergies:   penicillin (Rash)        ROS:    REVIEW OF SYSTEMS    [x ] A ten-point review of systems was otherwise negative except as noted.  [ ] Due to altered mental status/intubation, subjective information were not able to be obtained from the patient. History was obtained, to the extent possible, from review of the chart and collateral sources of information.      CURRENT MEDICATIONS:   --------------------------------------------------------------------------------------  Neurologic Medications  acetaminophen   Tablet .. 650 milliGRAM(s) Oral every 6 hours  oxyCODONE    5 mG/acetaminophen 325 mG 1 Tablet(s) Oral every 4 hours PRN Severe Pain (7 - 10)    Respiratory Medications    Cardiovascular Medications  ATENolol  Tablet 50 milliGRAM(s) Oral daily  hydrochlorothiazide 12.5 milliGRAM(s) Oral daily  losartan 50 milliGRAM(s) Oral daily    Gastrointestinal Medications  pantoprazole  Injectable 40 milliGRAM(s) IV Push daily  simethicone 80 milliGRAM(s) Chew every 6 hours  sodium chloride 0.9%. 1000 milliLiter(s) IV Continuous <Continuous>    Genitourinary Medications    Hematologic/Oncologic Medications  heparin  Injectable 5000 Unit(s) SubCutaneous every 8 hours    Antimicrobial/Immunologic Medications    Endocrine/Metabolic Medications    Topical/Other Medications    --------------------------------------------------------------------------------------    VITAL SIGNS, INS/OUTS (last 24 hours):  --------------------------------------------------------------------------------------  ICU Vital Signs Last 24 Hrs  T(C): 35.4 (23 Nov 2018 07:08), Max: 36.3 (22 Nov 2018 16:00)  T(F): 95.8 (23 Nov 2018 07:08), Max: 97.4 (22 Nov 2018 16:00)  HR: 64 (23 Nov 2018 07:08) (58 - 64)  BP: 122/59 (23 Nov 2018 07:08) (103/51 - 122/59)  BP(mean): --  ABP: --  ABP(mean): --  RR: 18 (23 Nov 2018 07:08) (18 - 18)  SpO2: --    I&O's Summary    22 Nov 2018 07:01  -  23 Nov 2018 07:00  --------------------------------------------------------  IN: 480 mL / OUT: 600 mL / NET: -120 mL    23 Nov 2018 07:01  -  23 Nov 2018 15:50  --------------------------------------------------------  IN: 590 mL / OUT: 1200 mL / NET: -610 mL      --------------------------------------------------------------------------------------  PHYSICAL EXAM    General: NAD, AAOx3, calm and cooperative  HEENT: NCAT, VERONA, EOMI, Trachea ML, Neck supple  Cardiac: RRR S1, S2, no Murmurs, rubs or gallops  Respiratory: CTAB, normal respiratory effort, breath sounds equal BL, no wheeze, rhonchi or crackles  Abdomen: Soft, non-distended, non-tender, +bowel sounds  Musculoskeletal: Strength 5/5 BL UE/LE, ROM intact, compartments soft  Neuro: Sensation grossly intact and equal throughout, CN II-XII intact, no focal deficits  Vascular: Pulses 2+ throughout, extremities well perfused  Skin: Warm/dry, normal color, no jaundice  Incision/wound: healing well, dressings in place, clean, dry and intact    LABS  --------------------------------------------------------------------------------------  Labs:  CAPILLARY BLOOD GLUCOSE                        8.6    14.79 )-----------( 397      ( 22 Nov 2018 01:36 )             28.3         11-23    130<L>  |  93<L>  |  56<H>  ----------------------------<  113<H>  4.9   |  21  |  3.3<H>      Calcium, Total Serum: 7.8 mg/dL (11-23-18 @ 08:18)    --------------------------------------------------------------------------------------  IMAGING RESULTS  KUB:     --------------------------------------------------------------------------------------- General Surgery Consultation Note  =====================================================  HPI: 78y Female PMH mitral valve disease (mild MV regurg on echo as well as pulmonic and tricuspid regurgitation and severe pulmonary HTN), IBS, HTN, and h/o BL knee and right hip replacements admitted after level 2 trauma mechanical fall from standing onto right side and right knee with right femur fracture, now s/p R distal femur ORIF with locking plate about total knee and hip replacement with Dr. Velasquez overnight 11/21 into 11/22.   Patient has developed cramping and bloating abdominal pain with increased flatus today, no bowel movements x4 days in setting of IBS history. She states she feels bloated, and that pain has slightly improved with increased flatus.  She denies any nausea or vomiting, within significant belching. She has started simethicone today.   She tolerated small PO intake and has not yet ambulated, and per RN has been refusing getting out of bed.     PAST MEDICAL & SURGICAL HISTORY:  Mitral valve disorder- echo with MVR  irritable bowel syndrome- diarrheal, takes immodium daily  HTN  urinary retention postop   BL knee replacement  History of R hip replacement        Home Meds: Home Medications:  Aspir 81 oral delayed release tablet: 1 tab(s) orally once a day (19 Nov 2018 15:42)  atenolol  losartan  immodium     Allergies:   penicillin (Rash)        ROS:    REVIEW OF SYSTEMS    [x ] A ten-point review of systems was otherwise negative except as noted.  [ ] Due to altered mental status/intubation, subjective information were not able to be obtained from the patient. History was obtained, to the extent possible, from review of the chart and collateral sources of information.      CURRENT MEDICATIONS:   --------------------------------------------------------------------------------------  Neurologic Medications  acetaminophen   Tablet .. 650 milliGRAM(s) Oral every 6 hours  oxyCODONE    5 mG/acetaminophen 325 mG 1 Tablet(s) Oral every 4 hours PRN Severe Pain (7 - 10)    Respiratory Medications    Cardiovascular Medications  ATENolol  Tablet 50 milliGRAM(s) Oral daily  hydrochlorothiazide 12.5 milliGRAM(s) Oral daily  losartan 50 milliGRAM(s) Oral daily    Gastrointestinal Medications  pantoprazole  Injectable 40 milliGRAM(s) IV Push daily  simethicone 80 milliGRAM(s) Chew every 6 hours  sodium chloride 0.9%. 1000 milliLiter(s) IV Continuous <Continuous>    Genitourinary Medications    Hematologic/Oncologic Medications  heparin  Injectable 5000 Unit(s) SubCutaneous every 8 hours    Antimicrobial/Immunologic Medications    Endocrine/Metabolic Medications    Topical/Other Medications    --------------------------------------------------------------------------------------    VITAL SIGNS, INS/OUTS (last 24 hours):  --------------------------------------------------------------------------------------  ICU Vital Signs Last 24 Hrs  T(C): 35.4 (23 Nov 2018 07:08), Max: 36.3 (22 Nov 2018 16:00)  T(F): 95.8 (23 Nov 2018 07:08), Max: 97.4 (22 Nov 2018 16:00)  HR: 64 (23 Nov 2018 07:08) (58 - 64)  BP: 122/59 (23 Nov 2018 07:08) (103/51 - 122/59)  BP(mean): --  ABP: --  ABP(mean): --  RR: 18 (23 Nov 2018 07:08) (18 - 18)  SpO2: --    I&O's Summary    22 Nov 2018 07:01  -  23 Nov 2018 07:00  --------------------------------------------------------  IN: 480 mL / OUT: 600 mL / NET: -120 mL    23 Nov 2018 07:01  -  23 Nov 2018 15:50  --------------------------------------------------------  IN: 590 mL / OUT: 1200 mL / NET: -610 mL      --------------------------------------------------------------------------------------  PHYSICAL EXAM    General: NAD, AAOx3, calm and cooperative  HEENT: NCAT, EOMI, , Neck supple  Cardiac: RRR S1, S2, no Murmurs, rubs or gallops  Respiratory: CTAB, normal respiratory effort, breath sounds equal BL, no wheeze, rhonchi or crackles  Abdomen: Soft, mildly distended, tenderness to palpation most in RLQ but across low abdomen, with no rebound or guarding, mild tympany, + hyperactive bowel sounds  Musculoskeletal:  compartments soft, knee immobilizer on right leg with ACE dressing. Motor gross intact at ankles, BL UE  Neuro: moving all extremities, no focal deficits  Skin: Warm/dry, normal color, no jaundice      LABS  --------------------------------------------------------------------------------------  Labs:  CAPILLARY BLOOD GLUCOSE                        8.6    14.79 )-----------( 397      ( 22 Nov 2018 01:36 )             28.3         11-23    130<L>  |  93<L>  |  56<H>  ----------------------------<  113<H>  4.9   |  21  |  3.3<H>      Calcium, Total Serum: 7.8 mg/dL (11-23-18 @ 08:18)    --------------------------------------------------------------------------------------  IMAGING RESULTS  KUB: < from: Xray Kidney Ureter Bladder (11.23.18 @ 13:48) >  FINDINGS:    Nonspecific bowel gas pattern without evidence of obstruction. Large amount of stool throughout the bowel. No supine evidence of free air.    Extensive degenerative changes throughout the spine. Partially visualized right hip arthroplasty.    IMPRESSION:  No evidence of obstruction. Large amount of stool throughout the bowel.    < end of copied text >      ---------------------------------------------------------------------------------------

## 2018-11-23 NOTE — CONSULT NOTE ADULT - ASSESSMENT
IMPRESSION: Rehab of gait ab R distal femur fx SP ORIF    PRECAUTIONS: [    ] Cardiac  [    ] Respiratory  [    ] Seizures [    ] Contact Isolation  [    ] Droplet Isolation  [    ] Other    Weight Bearing Status: NWB RLE IN DEREK    RECOMMENDATION:    Out of Bed to Chair     DVT/Decubiti Prophylaxis    REHAB PLAN:     [   xx  ] Bedside P/T 3-5 times a week   [     ] Bedside O/T  2-3 times a week   [     ] No Rehab Therapy Indicated   [     ]  Speech Therapy   Conditioning/ROM                                 ADL  Bed Mobility                                            Conditioning/ROM  Transfers                                                  Bed Mobility  Sitting /Standing Balance                      Transfers                                        Gait Training                                            Sitting/Standing Balance  Stair Training [   ]Applicable                 Home equipment Eval                                                                     Splinting  [   ] Only      GOALS:   ADL   [  x  ]   Independent         Transfers  [ x   ] Independent            Ambulation  [    x ] Independent     [   x  ] With device                            [    ]  CG                                               [    ]  CG                                                    [     ] CG                            [    ] Min A                                          [    ] Min A                                                [     ] Min  A          DISCHARGE PLAN:   [     ]  Good candidate for Intensive Rehabilitation/Hospital based                                             Will tolerate 3hrs Intensive Rehab Daily                                       [   x   ]  Short Term Rehab in Skilled Nursing Facility                                       [      ]  Home with Outpatient or  services                                         [      ]  Possible Candidate for Intensive Hospital based Rehab

## 2018-11-23 NOTE — CONSULT NOTE ADULT - SUBJECTIVE AND OBJECTIVE BOX
Patient is a 78y old  Female who presents with a chief complaint of Fall (23 Nov 2018 15:49)    HPI:  77 yo F s/p fall from standing (tripped and fell) on R side, hit her R knee. No head injury, no LOC. Presented with stable vitals. R knee swollen.    PMH/PSH: b/l knee replacement, R hip replacement  ALL: PCN  Meds: ASA, losartan, chlorthalidone, atenolol, HCTZ (19 Nov 2018 15:06)      PAST MEDICAL & SURGICAL HISTORY:  Mitral valve disorder  Other irritable bowel syndrome  H/O knee surgery  History of hip surgery  Hypertension  No significant past surgical history      Hospital Course: Seen By ortho xrays with distal R femur fx SP ORIF on 11/21    TODAY'S SUBJECTIVE & REVIEW OF SYMPTOMS:     Constitutional WNL   Cardio WNL   Resp WNL   GI WNL  Heme WNL  Endo WNL  Skin WNL  MSK pain RLE  Neuro WNL  Cognitive WNL  Psych WNL      MEDICATIONS  (STANDING):  acetaminophen   Tablet .. 650 milliGRAM(s) Oral every 6 hours  ATENolol  Tablet 50 milliGRAM(s) Oral daily  docusate sodium 100 milliGRAM(s) Oral three times a day  heparin  Injectable 5000 Unit(s) SubCutaneous every 8 hours  hydrochlorothiazide 12.5 milliGRAM(s) Oral daily  iohexol 300 mG (iodine)/mL Oral Solution 30 milliLiter(s) Oral once  losartan 50 milliGRAM(s) Oral daily  pantoprazole  Injectable 40 milliGRAM(s) IV Push daily  polyethylene glycol 3350 17 Gram(s) Oral two times a day  senna 2 Tablet(s) Oral two times a day  simethicone 80 milliGRAM(s) Chew every 6 hours  sodium chloride 0.9%. 1000 milliLiter(s) (100 mL/Hr) IV Continuous <Continuous>    MEDICATIONS  (PRN):  oxyCODONE    5 mG/acetaminophen 325 mG 1 Tablet(s) Oral every 4 hours PRN Severe Pain (7 - 10)      FAMILY HISTORY:  No pertinent family history in first degree relatives      Allergies    penicillin (Rash)    Intolerances        SOCIAL HISTORY:    [    ] Etoh  [    ] Smoking  [    ] Substance abuse     Home Environment:  [    ] Home Alone  [  x  ] Lives with Family  [    ] Home Health Aid    Dwelling:  [    ] Apartment  [  x  ] Private House  [    ] Adult Home  [    ] Skilled Nursing Facility      [    ] Short Term  [    ] Long Term  [ x   ] Stairs [id number]                          [    ] Elevator     FUNCTIONAL STATUS PTA: (Check all that apply)  Ambulation: [   x  ]Independent    [    ] Dependent     [    ] Non-Ambulatory  Assistive Device: [    ] SA Cane  [    ]  Q Cane  [    ] Walker  [    ]  Wheelchair  ADL : [  x  ] Independent  [    ]  Dependent       Vital Signs Last 24 Hrs  T(C): 35.4 (23 Nov 2018 07:08), Max: 35.4 (23 Nov 2018 07:08)  T(F): 95.8 (23 Nov 2018 07:08), Max: 95.8 (23 Nov 2018 07:08)  HR: 64 (23 Nov 2018 07:08) (59 - 64)  BP: 122/59 (23 Nov 2018 07:08) (106/53 - 122/59)  BP(mean): --  RR: 18 (23 Nov 2018 07:08) (18 - 18)  SpO2: --      PHYSICAL EXAM: Alert & Oriented X3  GENERAL: NAD, well-groomed, well-developed  HEAD:  Atraumatic, Normocephalic  EYES: EOMI, PERRLA, conjunctiva and sclera clear  NECK: Supple, No JVD, Normal thyroid  CHEST/LUNG: Clear bilaterally; No rales, rhonchi, wheezing, or rubs  HEART: Regular rate and rhythm; No murmurs, rubs, or gallops  ABDOMEN: Soft, Nontender, Nondistended; Bowel sounds present  EXTREMITIES:  RLE with ace /dona, no calf tenderness    NERVOUS SYSTEM:  Cranial Nerves 2-12 intact [ x   ] Abnormal  [    ]  ROM: WFL all extremities [    ]  Abnormal [   x  ]RLE not tested  Motor Strength: WFL all extremities  [    ]  Abnormal [  x  ]RLE not tested Prox, good flex/ext foot/toes  Sensation: intact to light touch [  x  ] Abnormal [    ]      FUNCTIONAL STATUS:  Bed Mobility: [   ]  Independent [    ]  Supervision [  x  ]  Needs Assistance [  ]  N/A  Transfers: [    ]  Independent [    ]  Supervision [  x  ]  Needs Assistance [    ]  N/A    Ambulation:  [    ]  Independent [    ]  Supervision [    ]  Needs Assistance [   x ]  N/A   ADL:  [    ]   Independent [    ] Requires Assistance [   x ] N/A   2 PERSON MAX OOB TO COMMODE    LABS:                        8.6    14.79 )-----------( 397      ( 22 Nov 2018 01:36 )             28.3     11-23    130<L>  |  93<L>  |  56<H>  ----------------------------<  113<H>  4.9   |  21  |  3.3<H>    Ca    7.8<L>      23 Nov 2018 08:18  Phos  5.7     11-22  Mg     2.0     11-22            RADIOLOGY & ADDITIONAL STUDIES:

## 2018-11-23 NOTE — PROGRESS NOTE ADULT - SUBJECTIVE AND OBJECTIVE BOX
pt c/o r quad pain   bs pos  passing gas                        8.6    14.79 )-----------( 397      ( 22 Nov 2018 01:36 )             28.3     fat plate done  gen surg to eval pt c/o r quad pain   bs pos  passing gas                        8.6    14.79 )-----------( 397      ( 22 Nov 2018 01:36 )             28.3     fat plate done  gen surg to eval  pt seen and examined expected pain  will get dressing change  over weekend  check  H and H

## 2018-11-23 NOTE — CONSULT NOTE ADULT - ASSESSMENT
ASSESSMENT:  78y Female ***    PLAN:       --------------------------------------------------------------------------------------    11-23-18 @ 15:50 ASSESSMENT:  78y Female PMH MVP, IBS, HTN, urinary retention with R femur fx s/p ORIF with locking plate now with abdominal pain, constipation, ESTEFANY, and rising WBC    PLAN:   - check UA with elevated WBC and rising creatinine- rule out UTI  - check lactate  - replete electrolytes as needed  - Stool softeners and consider enema to ensure bowel movement  - minimize opoid pain medications for ileus, avoid NSAIDs d/t renal injury   - bowel rest today, and encourage ambulation when able by orthopedics   - If negative UA and has BM, if pain not improved, consider CT AP with contrast including PO tomorrow.   Trauma surgery will follow   --------------------------------------------------------------------------------------    11-23-18 @ 15:50 ASSESSMENT:  78y Female PMH MVP, IBS, HTN, urinary retention with R femur fx s/p ORIF with locking plate now with abdominal pain, constipation, ESTEFANY, and rising WBC    PLAN:   - check UA with elevated WBC and rising creatinine- rule out UTI  - check lactate  - replete electrolytes as needed  - Stool softeners and consider enema to ensure bowel movement  - minimize opoid pain medications for ileus, avoid NSAIDs d/t renal injury   - bowel rest today, and encourage ambulation when able by orthopedics   - CT AP with PO  contrast, follow up results  Trauma surgery will follow   --------------------------------------------------------------------------------------    11-23-18 @ 15:50 ASSESSMENT:  78y Female PMH MVP, IBS, HTN, urinary retention with R femur fx s/p ORIF with locking plate now with abdominal pain, constipation, ESTEFANY, and rising WBC. KUB with stool load, likely constipation and postop ileus     PLAN:   - f/u final read of KUB  - check UA with elevated WBC and rising creatinine- rule out UTI  - check lactate  - replete electrolytes as needed  - Stool softeners and consider enema to ensure bowel movement  - minimize opoid pain medications for ileus, avoid NSAIDs d/t renal injury   - bowel rest today, and encourage ambulation when able by orthopedics   - CT AP with PO contrast, follow up results. Radiology called ([phone]), will get PO contrast and scan delayed 2hrs, RN aware   Trauma surgery will follow   --------------------------------------------------------------------------------------    11-23-18 @ 15:50 ASSESSMENT:  78y Female PMH MVP, IBS, HTN, urinary retention with R femur fx s/p ORIF with locking plate now with abdominal pain, constipation, ESTEFANY, and rising WBC. KUB with stool load, likely constipation and postop ileus     PLAN:   - check UA with elevated WBC and rising creatinine- rule out UTI  - check lactate  - replete electrolytes as needed  - minimize opoid pain medications for ileus, avoid NSAIDs d/t renal injury   - bowel rest today, and encourage ambulation when able by orthopedics   - CT AP with PO contrast, follow up results. Radiology called ([phone]), will get PO contrast and scan delayed 2hrs, RN aware   Trauma surgery will follow   --------------------------------------------------------------------------------------    11-23-18 @ 15:50 ASSESSMENT:  78y Female PMH MVP, IBS, HTN, urinary retention with R femur fx s/p ORIF with locking plate now with abdominal pain, constipation, ESTEFANY, and rising WBC. KUB with stool load, likely constipation and postop ileus     PLAN:   - check UA with elevated WBC and rising creatinine- ua positive- send urine cultures and start rocephin  - check lactate  - replete electrolytes as needed  - minimize opoid pain medications for ileus, avoid NSAIDs d/t renal injury   - bowel rest today, and encourage ambulation when able by orthopedics   - CT AP with PO contrast, follow up results. Radiology called ([phone]), will get PO contrast and scan delayed 2hrs, RN aware   Trauma surgery will follow   --------------------------------------------------------------------------------------    11-23-18 @ 15:50

## 2018-11-23 NOTE — PROVIDER CONTACT NOTE (OTHER) - RECOMMENDATIONS
Please put in an order for gas pain
pt does not want a Brennan again because she fears she wont be able to go once that brennan comes out. pt stated that this has happened before.
Please assess pt
assess pt?

## 2018-11-24 LAB
ALLERGY+IMMUNOLOGY DIAG STUDY NOTE: SIGNIFICANT CHANGE UP
ANION GAP SERPL CALC-SCNC: 13 MMOL/L — SIGNIFICANT CHANGE UP (ref 7–14)
ANION GAP SERPL CALC-SCNC: 15 MMOL/L — HIGH (ref 7–14)
APTT BLD: 32.2 SEC — SIGNIFICANT CHANGE UP (ref 27–39.2)
BUN SERPL-MCNC: 41 MG/DL — HIGH (ref 10–20)
BUN SERPL-MCNC: 43 MG/DL — HIGH (ref 10–20)
CALCIUM SERPL-MCNC: 7.6 MG/DL — LOW (ref 8.5–10.1)
CALCIUM SERPL-MCNC: 7.8 MG/DL — LOW (ref 8.5–10.1)
CHLORIDE SERPL-SCNC: 102 MMOL/L — SIGNIFICANT CHANGE UP (ref 98–110)
CHLORIDE SERPL-SCNC: 102 MMOL/L — SIGNIFICANT CHANGE UP (ref 98–110)
CO2 SERPL-SCNC: 19 MMOL/L — SIGNIFICANT CHANGE UP (ref 17–32)
CO2 SERPL-SCNC: 20 MMOL/L — SIGNIFICANT CHANGE UP (ref 17–32)
CREAT SERPL-MCNC: 1.6 MG/DL — HIGH (ref 0.7–1.5)
CREAT SERPL-MCNC: 1.7 MG/DL — HIGH (ref 0.7–1.5)
GLUCOSE SERPL-MCNC: 106 MG/DL — HIGH (ref 70–99)
GLUCOSE SERPL-MCNC: 97 MG/DL — SIGNIFICANT CHANGE UP (ref 70–99)
HCT VFR BLD CALC: 20.1 % — LOW (ref 37–47)
HCT VFR BLD CALC: 20.5 % — LOW (ref 37–47)
HGB BLD-MCNC: 6.5 G/DL — CRITICAL LOW (ref 12–16)
HGB BLD-MCNC: 6.7 G/DL — CRITICAL LOW (ref 12–16)
INR BLD: 1.07 RATIO — SIGNIFICANT CHANGE UP (ref 0.65–1.3)
LDH SERPL L TO P-CCNC: 151 — SIGNIFICANT CHANGE UP (ref 50–242)
MAGNESIUM SERPL-MCNC: 2.1 MG/DL — SIGNIFICANT CHANGE UP (ref 1.8–2.4)
MCHC RBC-ENTMCNC: 28.8 PG — SIGNIFICANT CHANGE UP (ref 27–31)
MCHC RBC-ENTMCNC: 29.3 PG — SIGNIFICANT CHANGE UP (ref 27–31)
MCHC RBC-ENTMCNC: 32.3 G/DL — SIGNIFICANT CHANGE UP (ref 32–37)
MCHC RBC-ENTMCNC: 32.7 G/DL — SIGNIFICANT CHANGE UP (ref 32–37)
MCV RBC AUTO: 88.9 FL — SIGNIFICANT CHANGE UP (ref 81–99)
MCV RBC AUTO: 89.5 FL — SIGNIFICANT CHANGE UP (ref 81–99)
NRBC # BLD: 0 /100 WBCS — SIGNIFICANT CHANGE UP (ref 0–0)
NRBC # BLD: 0 /100 WBCS — SIGNIFICANT CHANGE UP (ref 0–0)
PHOSPHATE SERPL-MCNC: 3.2 MG/DL — SIGNIFICANT CHANGE UP (ref 2.1–4.9)
PLATELET # BLD AUTO: 338 K/UL — SIGNIFICANT CHANGE UP (ref 130–400)
PLATELET # BLD AUTO: 343 K/UL — SIGNIFICANT CHANGE UP (ref 130–400)
POTASSIUM SERPL-MCNC: 3.7 MMOL/L — SIGNIFICANT CHANGE UP (ref 3.5–5)
POTASSIUM SERPL-MCNC: 3.8 MMOL/L — SIGNIFICANT CHANGE UP (ref 3.5–5)
POTASSIUM SERPL-SCNC: 3.7 MMOL/L — SIGNIFICANT CHANGE UP (ref 3.5–5)
POTASSIUM SERPL-SCNC: 3.8 MMOL/L — SIGNIFICANT CHANGE UP (ref 3.5–5)
PROTHROM AB SERPL-ACNC: 12.3 SEC — SIGNIFICANT CHANGE UP (ref 9.95–12.87)
RBC # BLD: 2.26 M/UL — LOW (ref 4.2–5.4)
RBC # BLD: 2.29 M/UL — LOW (ref 4.2–5.4)
RBC # FLD: 14 % — SIGNIFICANT CHANGE UP (ref 11.5–14.5)
RBC # FLD: 14.1 % — SIGNIFICANT CHANGE UP (ref 11.5–14.5)
SODIUM SERPL-SCNC: 135 MMOL/L — SIGNIFICANT CHANGE UP (ref 135–146)
SODIUM SERPL-SCNC: 136 MMOL/L — SIGNIFICANT CHANGE UP (ref 135–146)
TYPE + AB SCN PNL BLD: SIGNIFICANT CHANGE UP
WBC # BLD: 13.62 K/UL — HIGH (ref 4.8–10.8)
WBC # BLD: 14.96 K/UL — HIGH (ref 4.8–10.8)
WBC # FLD AUTO: 13.62 K/UL — HIGH (ref 4.8–10.8)
WBC # FLD AUTO: 14.96 K/UL — HIGH (ref 4.8–10.8)

## 2018-11-24 RX ORDER — ENOXAPARIN SODIUM 100 MG/ML
40 INJECTION SUBCUTANEOUS DAILY
Qty: 0 | Refills: 0 | Status: DISCONTINUED | OUTPATIENT
Start: 2018-11-24 | End: 2018-11-26

## 2018-11-24 RX ADMIN — Medication 650 MILLIGRAM(S): at 11:26

## 2018-11-24 RX ADMIN — LOSARTAN POTASSIUM 50 MILLIGRAM(S): 100 TABLET, FILM COATED ORAL at 05:49

## 2018-11-24 RX ADMIN — POLYETHYLENE GLYCOL 3350 17 GRAM(S): 17 POWDER, FOR SOLUTION ORAL at 05:50

## 2018-11-24 RX ADMIN — SIMETHICONE 80 MILLIGRAM(S): 80 TABLET, CHEWABLE ORAL at 05:49

## 2018-11-24 RX ADMIN — Medication 650 MILLIGRAM(S): at 17:26

## 2018-11-24 RX ADMIN — Medication 100 MILLIGRAM(S): at 05:49

## 2018-11-24 RX ADMIN — ENOXAPARIN SODIUM 40 MILLIGRAM(S): 100 INJECTION SUBCUTANEOUS at 17:26

## 2018-11-24 RX ADMIN — PANTOPRAZOLE SODIUM 40 MILLIGRAM(S): 20 TABLET, DELAYED RELEASE ORAL at 11:27

## 2018-11-24 RX ADMIN — SENNA PLUS 2 TABLET(S): 8.6 TABLET ORAL at 05:49

## 2018-11-24 RX ADMIN — Medication 650 MILLIGRAM(S): at 23:12

## 2018-11-24 RX ADMIN — HEPARIN SODIUM 5000 UNIT(S): 5000 INJECTION INTRAVENOUS; SUBCUTANEOUS at 05:49

## 2018-11-24 RX ADMIN — ATENOLOL 50 MILLIGRAM(S): 25 TABLET ORAL at 05:49

## 2018-11-24 RX ADMIN — Medication 650 MILLIGRAM(S): at 05:56

## 2018-11-24 RX ADMIN — Medication 12.5 MILLIGRAM(S): at 05:49

## 2018-11-24 RX ADMIN — Medication 650 MILLIGRAM(S): at 05:49

## 2018-11-24 RX ADMIN — SIMETHICONE 80 MILLIGRAM(S): 80 TABLET, CHEWABLE ORAL at 11:27

## 2018-11-24 NOTE — PHYSICAL THERAPY INITIAL EVALUATION ADULT - SPECIFY REASON(S)
HGL=6.7. Consulted Dr. Nathan and he reports pt is getting blood now. PT on hold until cleared medically. Dr. Nathan also made aware that pt requires OOB orders and + knee immobilizer in patient care.

## 2018-11-24 NOTE — PROGRESS NOTE ADULT - SUBJECTIVE AND OBJECTIVE BOX
seen and examined  interval events:  had BM yesterday  feels much better  still a little tender RLQ/LLQ  CTAP negative for acute pathology  lactate, UA normal  KUB read wnl, stool    ICU Vital Signs Last 24 Hrs  T(C): 37.4 (24 Nov 2018 08:13), Max: 37.4 (24 Nov 2018 08:13)  T(F): 99.4 (24 Nov 2018 08:13), Max: 99.4 (24 Nov 2018 08:13)  HR: 70 (24 Nov 2018 08:13) (70 - 80)  BP: 97/47 (24 Nov 2018 08:13) (97/47 - 146/58)  BP(mean): --  ABP: --  ABP(mean): --  RR: 16 (24 Nov 2018 08:13) (16 - 18)  SpO2: --    PE  belly exam as above  RLE  dressing cdi  ehl fhl intact  silt  wwp  comps soft    POD3 R PPFF  NWB  pain control  dvt ppx  f/u gen surg  f/u labs  dispo pending

## 2018-11-24 NOTE — PROGRESS NOTE ADULT - SUBJECTIVE AND OBJECTIVE BOX
Progress Note: General Surgery  Patient: AGUSTÍN TURCIOS , 78y (1940)Female   MRN: [id number]  Location: [address]  Visit: 18 Inpatient  Date: 18 @ 07:22  Hospital Day: 6    Procedure/Diagnosis: Right periprosthetic isolation femur fracture s/p ORIF, re-call for abdominal pain  Events over 24h: No acute overnight events. No recent bowel movement.     Vitals: T(F): 98 (18 @ 23:30), Max: 98.4 (18 @ 16:00)  HR: 80 (18 @ 23:30)  BP: 106/52 (18 @ 23:30) (106/52 - 146/58)  RR: 18 (18 @ 23:30)  SpO2: --    In:   18 @ 07:01  -  18 @ 07:00  --------------------------------------------------------  IN: 590 mL      Out:   18 @ 07:01  -  18 @ 07:00  --------------------------------------------------------  OUT:    Indwelling Catheter - Urethral: 3550 mL  Total OUT: 3550 mL        Net:   18 @ 07:01  -  18 @ 07:00  --------------------------------------------------------  NET: -2960 mL      Diet: Diet, NPO:   Except Medications (18 @ 17:05)    IV Fluids: yes no , Type: sodium chloride 0.9%. 1000 milliLiter(s) (100 mL/Hr) IV Continuous <Continuous>    Physical Examination:  General Appearance: NAD, alert and cooperative  HEENT: NCAT, WNL  Heart: S1 and S2. No murmurs. RRR  Lungs: Clear to auscultation BL without rales, rhonchi, wheezing, crackles or diminished breath sounds.  Abdomen: Soft, nondistended    Medications: [Standing]  acetaminophen   Tablet .. 650 milliGRAM(s) Oral every 6 hours  ATENolol  Tablet 50 milliGRAM(s) Oral daily  docusate sodium 100 milliGRAM(s) Oral three times a day  heparin  Injectable 5000 Unit(s) SubCutaneous every 8 hours  hydrochlorothiazide 12.5 milliGRAM(s) Oral daily  losartan 50 milliGRAM(s) Oral daily  pantoprazole  Injectable 40 milliGRAM(s) IV Push daily  polyethylene glycol 3350 17 Gram(s) Oral two times a day  senna 2 Tablet(s) Oral two times a day  simethicone 80 milliGRAM(s) Chew every 6 hours  sodium chloride 0.9%. 1000 milliLiter(s) (100 mL/Hr) IV Continuous <Continuous>    DVT Prophylaxis: heparin  Injectable 5000 Unit(s) SubCutaneous every 8 hours    GI Prophylaxis: pantoprazole  Injectable 40 milliGRAM(s) IV Push daily    Antibiotics:   Anticoagulation:   Medications:[PRN]  oxyCODONE    5 mG/acetaminophen 325 mG 1 Tablet(s) Oral every 4 hours PRN    Labs:        130<L>  |  93<L>  |  56<H>  ----------------------------<  113<H>  4.9   |  21  |  3.3<H>    Ca    7.8<L>      2018 08:18    Urine/Micro:    Urinalysis Basic - ( 2018 21:55 )    Color: Dark Yellow / Appearance: Turbid / S.010 / pH: x  Gluc: x / Ketone: Negative  / Bili: Negative / Urobili: 0.2 mg/dL   Blood: x / Protein: 30 mg/dL / Nitrite: Negative   Leuk Esterase: Large / RBC: >50 /HPF / WBC >50 /HPF   Sq Epi: x / Non Sq Epi: Few /HPF / Bacteria: x        Assessment:  78y Female patient admitted S/P Right periprosthetic isolation femur fracture s/p ORIF, re-call for abdominal pain    Plan:  Diet  Bowel regimen  F/u UA  F/u lactate  F/u CT PO contrast  Avoid opioids      Date/Time: 18 @ 07:22

## 2018-11-25 LAB
APPEARANCE UR: ABNORMAL
BACTERIA # UR AUTO: ABNORMAL /HPF
BILIRUB UR-MCNC: NEGATIVE — SIGNIFICANT CHANGE UP
COLOR SPEC: YELLOW — SIGNIFICANT CHANGE UP
DIFF PNL FLD: ABNORMAL
GLUCOSE UR QL: NEGATIVE MG/DL — SIGNIFICANT CHANGE UP
HCT VFR BLD CALC: 23.4 % — LOW (ref 37–47)
HCT VFR BLD CALC: 23.8 % — LOW (ref 37–47)
HGB BLD-MCNC: 7.6 G/DL — LOW (ref 12–16)
HGB BLD-MCNC: 7.6 G/DL — LOW (ref 12–16)
KETONES UR-MCNC: NEGATIVE — SIGNIFICANT CHANGE UP
LEUKOCYTE ESTERASE UR-ACNC: ABNORMAL
MCHC RBC-ENTMCNC: 28 PG — SIGNIFICANT CHANGE UP (ref 27–31)
MCHC RBC-ENTMCNC: 28.1 PG — SIGNIFICANT CHANGE UP (ref 27–31)
MCHC RBC-ENTMCNC: 31.9 G/DL — LOW (ref 32–37)
MCHC RBC-ENTMCNC: 32.5 G/DL — SIGNIFICANT CHANGE UP (ref 32–37)
MCV RBC AUTO: 86.7 FL — SIGNIFICANT CHANGE UP (ref 81–99)
MCV RBC AUTO: 87.8 FL — SIGNIFICANT CHANGE UP (ref 81–99)
NITRITE UR-MCNC: POSITIVE
NRBC # BLD: 0 /100 WBCS — SIGNIFICANT CHANGE UP (ref 0–0)
NRBC # BLD: 0 /100 WBCS — SIGNIFICANT CHANGE UP (ref 0–0)
PH UR: 6 — SIGNIFICANT CHANGE UP (ref 5–8)
PLATELET # BLD AUTO: 333 K/UL — SIGNIFICANT CHANGE UP (ref 130–400)
PLATELET # BLD AUTO: 339 K/UL — SIGNIFICANT CHANGE UP (ref 130–400)
PROT UR-MCNC: 30 MG/DL
RBC # BLD: 2.7 M/UL — LOW (ref 4.2–5.4)
RBC # BLD: 2.71 M/UL — LOW (ref 4.2–5.4)
RBC # FLD: 16.5 % — HIGH (ref 11.5–14.5)
RBC # FLD: 16.8 % — HIGH (ref 11.5–14.5)
RBC CASTS # UR COMP ASSIST: ABNORMAL /HPF
SP GR SPEC: 1.01 — SIGNIFICANT CHANGE UP (ref 1.01–1.03)
UROBILINOGEN FLD QL: 0.2 MG/DL — SIGNIFICANT CHANGE UP (ref 0.2–0.2)
WBC # BLD: 11.77 K/UL — HIGH (ref 4.8–10.8)
WBC # BLD: 13.27 K/UL — HIGH (ref 4.8–10.8)
WBC # FLD AUTO: 11.77 K/UL — HIGH (ref 4.8–10.8)
WBC # FLD AUTO: 13.27 K/UL — HIGH (ref 4.8–10.8)
WBC UR QL: ABNORMAL /HPF

## 2018-11-25 RX ORDER — NITROFURANTOIN MACROCRYSTAL 50 MG
100 CAPSULE ORAL
Qty: 0 | Refills: 0 | Status: DISCONTINUED | OUTPATIENT
Start: 2018-11-25 | End: 2018-11-26

## 2018-11-25 RX ADMIN — LOSARTAN POTASSIUM 50 MILLIGRAM(S): 100 TABLET, FILM COATED ORAL at 05:27

## 2018-11-25 RX ADMIN — Medication 650 MILLIGRAM(S): at 17:47

## 2018-11-25 RX ADMIN — Medication 100 MILLIGRAM(S): at 17:48

## 2018-11-25 RX ADMIN — ENOXAPARIN SODIUM 40 MILLIGRAM(S): 100 INJECTION SUBCUTANEOUS at 12:54

## 2018-11-25 RX ADMIN — OXYCODONE AND ACETAMINOPHEN 1 TABLET(S): 5; 325 TABLET ORAL at 14:26

## 2018-11-25 RX ADMIN — Medication 650 MILLIGRAM(S): at 23:20

## 2018-11-25 RX ADMIN — ATENOLOL 50 MILLIGRAM(S): 25 TABLET ORAL at 05:27

## 2018-11-25 RX ADMIN — OXYCODONE AND ACETAMINOPHEN 1 TABLET(S): 5; 325 TABLET ORAL at 12:52

## 2018-11-25 RX ADMIN — OXYCODONE AND ACETAMINOPHEN 1 TABLET(S): 5; 325 TABLET ORAL at 08:04

## 2018-11-25 RX ADMIN — Medication 650 MILLIGRAM(S): at 05:26

## 2018-11-25 NOTE — PHYSICAL THERAPY INITIAL EVALUATION ADULT - GENERAL OBSERVATIONS, REHAB EVAL
9:10-0:31 Pt encountered semifowler in bed in NAD. + R knee immobilizer, agreeable for PT.  Pt reports pain 4/10 on pain scale 9:10-0:31 Pt encountered semifowler in bed in NAD. + R knee immobilizer, agreeable for PT.  Pt reports pain 4/10 on pain scale, received pain meds earlier.

## 2018-11-25 NOTE — PROGRESS NOTE ADULT - SUBJECTIVE AND OBJECTIVE BOX
Patient seen and examined at at bedside in the am. No acute events overnight. Much more comfortable after passing bowel movement. Tolerating diet.     Vital Signs Last 24 Hrs  T(C): 36.6 (25 Nov 2018 07:17), Max: 36.9 (24 Nov 2018 16:26)  T(F): 97.8 (25 Nov 2018 07:17), Max: 98.4 (24 Nov 2018 16:26)  HR: 66 (25 Nov 2018 07:17) (66 - 86)  BP: 128/60 (25 Nov 2018 07:17) (126/58 - 163/69)  BP(mean): --  RR: 18 (25 Nov 2018 07:17) (18 - 20)  SpO2: --    PE  NAD, AOx3  Abd soft, nt, nd, no guarding  RLE  dressing cdi  ehl fhl intact  silt  wwp  comps soft    POD4 R PPFF, downtrending cbc 6.7 from 8.6, transfused 1UPRBC with repeat H/H 7.6    Trend cbc  Resend UA  NWB  pain control  dvt ppx  Gen surg notes appreciated   f/u labs  dispo pending

## 2018-11-25 NOTE — PHYSICAL THERAPY INITIAL EVALUATION ADULT - SPECIFY REASON(S)
8:25-8:37 Pt requesting to eat breakfast and for assistance with hygiene prior to PT. As per Donell RN pt received pain meds. Pt educated on goals for PT. Will f/u.

## 2018-11-26 ENCOUNTER — TRANSCRIPTION ENCOUNTER (OUTPATIENT)
Age: 78
End: 2018-11-26

## 2018-11-26 VITALS
RESPIRATION RATE: 18 BRPM | HEART RATE: 66 BPM | TEMPERATURE: 97 F | SYSTOLIC BLOOD PRESSURE: 160 MMHG | OXYGEN SATURATION: 100 % | DIASTOLIC BLOOD PRESSURE: 72 MMHG

## 2018-11-26 LAB
ANION GAP SERPL CALC-SCNC: 16 MMOL/L — HIGH (ref 7–14)
APPEARANCE UR: ABNORMAL
BACTERIA # UR AUTO: ABNORMAL /HPF
BILIRUB UR-MCNC: NEGATIVE — SIGNIFICANT CHANGE UP
BUN SERPL-MCNC: 39 MG/DL — HIGH (ref 10–20)
CALCIUM SERPL-MCNC: 8.3 MG/DL — LOW (ref 8.5–10.1)
CHLORIDE SERPL-SCNC: 104 MMOL/L — SIGNIFICANT CHANGE UP (ref 98–110)
CO2 SERPL-SCNC: 20 MMOL/L — SIGNIFICANT CHANGE UP (ref 17–32)
COLOR SPEC: YELLOW — SIGNIFICANT CHANGE UP
CREAT SERPL-MCNC: 1.3 MG/DL — SIGNIFICANT CHANGE UP (ref 0.7–1.5)
DIFF PNL FLD: ABNORMAL
GLUCOSE SERPL-MCNC: 84 MG/DL — SIGNIFICANT CHANGE UP (ref 70–99)
GLUCOSE UR QL: NEGATIVE MG/DL — SIGNIFICANT CHANGE UP
HCT VFR BLD CALC: 23.5 % — LOW (ref 37–47)
HGB BLD-MCNC: 7.4 G/DL — CRITICAL LOW (ref 12–16)
KETONES UR-MCNC: NEGATIVE — SIGNIFICANT CHANGE UP
LEUKOCYTE ESTERASE UR-ACNC: ABNORMAL
MCHC RBC-ENTMCNC: 28 PG — SIGNIFICANT CHANGE UP (ref 27–31)
MCHC RBC-ENTMCNC: 31.5 G/DL — LOW (ref 32–37)
MCV RBC AUTO: 89 FL — SIGNIFICANT CHANGE UP (ref 81–99)
NITRITE UR-MCNC: POSITIVE
NRBC # BLD: 0 /100 WBCS — SIGNIFICANT CHANGE UP (ref 0–0)
PH UR: 6 — SIGNIFICANT CHANGE UP (ref 5–8)
PLATELET # BLD AUTO: 366 K/UL — SIGNIFICANT CHANGE UP (ref 130–400)
POTASSIUM SERPL-MCNC: 3.9 MMOL/L — SIGNIFICANT CHANGE UP (ref 3.5–5)
POTASSIUM SERPL-SCNC: 3.9 MMOL/L — SIGNIFICANT CHANGE UP (ref 3.5–5)
PROT UR-MCNC: 30 MG/DL
RBC # BLD: 2.64 M/UL — LOW (ref 4.2–5.4)
RBC # FLD: 17 % — HIGH (ref 11.5–14.5)
RBC CASTS # UR COMP ASSIST: ABNORMAL /HPF
SODIUM SERPL-SCNC: 140 MMOL/L — SIGNIFICANT CHANGE UP (ref 135–146)
SP GR SPEC: 1.01 — SIGNIFICANT CHANGE UP (ref 1.01–1.03)
UROBILINOGEN FLD QL: 0.2 MG/DL — SIGNIFICANT CHANGE UP (ref 0.2–0.2)
WBC # BLD: 10.75 K/UL — SIGNIFICANT CHANGE UP (ref 4.8–10.8)
WBC # FLD AUTO: 10.75 K/UL — SIGNIFICANT CHANGE UP (ref 4.8–10.8)
WBC UR QL: >50 /HPF

## 2018-11-26 RX ORDER — FERROUS SULFATE 325(65) MG
325 TABLET ORAL DAILY
Qty: 0 | Refills: 0 | Status: DISCONTINUED | OUTPATIENT
Start: 2018-11-26 | End: 2018-11-26

## 2018-11-26 RX ORDER — NITROFURANTOIN MACROCRYSTAL 50 MG
1 CAPSULE ORAL
Qty: 0 | Refills: 0 | COMMUNITY
Start: 2018-11-26

## 2018-11-26 RX ORDER — ENOXAPARIN SODIUM 100 MG/ML
40 INJECTION SUBCUTANEOUS
Qty: 0 | Refills: 0 | COMMUNITY
Start: 2018-11-26

## 2018-11-26 RX ORDER — DOCUSATE SODIUM 100 MG
1 CAPSULE ORAL
Qty: 0 | Refills: 0 | DISCHARGE
Start: 2018-11-26

## 2018-11-26 RX ORDER — CHLORHEXIDINE GLUCONATE 213 G/1000ML
1 SOLUTION TOPICAL
Qty: 0 | Refills: 0 | Status: DISCONTINUED | OUTPATIENT
Start: 2018-11-26 | End: 2018-11-26

## 2018-11-26 RX ORDER — AMLODIPINE BESYLATE 2.5 MG/1
5 TABLET ORAL ONCE
Qty: 0 | Refills: 0 | Status: COMPLETED | OUTPATIENT
Start: 2018-11-26 | End: 2018-11-26

## 2018-11-26 RX ORDER — ONDANSETRON 8 MG/1
4 TABLET, FILM COATED ORAL EVERY 6 HOURS
Qty: 0 | Refills: 0 | Status: DISCONTINUED | OUTPATIENT
Start: 2018-11-26 | End: 2018-11-26

## 2018-11-26 RX ADMIN — AMLODIPINE BESYLATE 5 MILLIGRAM(S): 2.5 TABLET ORAL at 17:46

## 2018-11-26 RX ADMIN — ATENOLOL 50 MILLIGRAM(S): 25 TABLET ORAL at 05:13

## 2018-11-26 RX ADMIN — SIMETHICONE 80 MILLIGRAM(S): 80 TABLET, CHEWABLE ORAL at 12:17

## 2018-11-26 RX ADMIN — ENOXAPARIN SODIUM 40 MILLIGRAM(S): 100 INJECTION SUBCUTANEOUS at 12:16

## 2018-11-26 RX ADMIN — ONDANSETRON 4 MILLIGRAM(S): 8 TABLET, FILM COATED ORAL at 15:44

## 2018-11-26 RX ADMIN — OXYCODONE AND ACETAMINOPHEN 1 TABLET(S): 5; 325 TABLET ORAL at 08:12

## 2018-11-26 RX ADMIN — Medication 650 MILLIGRAM(S): at 05:11

## 2018-11-26 RX ADMIN — OXYCODONE AND ACETAMINOPHEN 1 TABLET(S): 5; 325 TABLET ORAL at 12:17

## 2018-11-26 RX ADMIN — Medication 100 MILLIGRAM(S): at 08:13

## 2018-11-26 RX ADMIN — LOSARTAN POTASSIUM 50 MILLIGRAM(S): 100 TABLET, FILM COATED ORAL at 05:12

## 2018-11-26 RX ADMIN — Medication 325 MILLIGRAM(S): at 12:16

## 2018-11-26 NOTE — DISCHARGE NOTE ADULT - ADDITIONAL INSTRUCTIONS
CHANGE DRESSING DAILY, IF DRAINAGE INCREASES, THERE IS WOUND ERYTHEMA OR IF YOU HAVE ANY QUESTIONS OR CONCERNS PLEASE CALL SURGEON DR ZEPEDA

## 2018-11-26 NOTE — DISCHARGE NOTE ADULT - CARE PROVIDER_API CALL
Pelon Velasquez), Orthopaedic Surgery  [address] [place] [id number]  Phone: [phone]  Fax: [phone]

## 2018-11-26 NOTE — DIETITIAN INITIAL EVALUATION ADULT. - ORAL INTAKE PTA
good/Generalized diet, picks all day at home, small eater.. No swallowing/GI problem. Takes Vitamin D, Ca, Cranberry, b12. NKFA

## 2018-11-26 NOTE — CONSULT NOTE ADULT - ATTENDING COMMENTS
seen and examined  agree with above  will require operative intervention pending optimization and clearances
I have personally examined the patient and reviewed the documentation above.  Corrections and edits were made wherever needed.

## 2018-11-26 NOTE — PROGRESS NOTE ADULT - ATTENDING COMMENTS
Assessment and plan above were modified and discussed with residents, physician assistants, and nurses.
She is OOB    rehab    discharge planning
agree with above  seen and examined  dvt ppx  is  scd  pt
pt had bowel movement    oob    rehab    discharge planning
pt seen and examined.  agree with resident exam and plan.
Assessment and plan above were modified and discussed with residents, physician assistants, and nurses.

## 2018-11-26 NOTE — DIETITIAN INITIAL EVALUATION ADULT. - PT NOT SOURCE
other (specify)/LOS- pt is alert and oriented. No edema. Surgical incision. LBM 11/25. No oral issue.

## 2018-11-26 NOTE — DISCHARGE NOTE ADULT - HOSPITAL COURSE
PATIENT ADMITTED WITH PERIPROSTHETIC DISTAL FEMUR FX, S/P ORIF WITH URINARY RETENTION AND UTI. ANTIBIOTICS WERE STARTED AND CULTURE SENT. PLEASE FOLLOW UP SENSITIVITIES. NEWMAN WAS REMOVED THIS AM PLEASE MONITOR FOR RETENTION .

## 2018-11-26 NOTE — DISCHARGE NOTE ADULT - PATIENT PORTAL LINK FT
You can access the [facility] Patient Portal, offered by [facility], by registering with the following website: [email or web address]

## 2018-11-26 NOTE — CONSULT NOTE ADULT - ASSESSMENT
+UA x 2    Plan  -f/u urine cx results    *******Incomplete note: pending ID attending review***************** IMPRESSION:  Asymptomatic pyuria with no evidene of pyelonephritis.  No sepsis    RECOMMENDATIONS:  No ABx

## 2018-11-26 NOTE — DISCHARGE NOTE ADULT - CARE PLAN
Principal Discharge DX:	Femur fracture, right  Goal:	RETURN TO PREVIOUS LEVEL OF INDEPENDENCE  Assessment and plan of treatment:	NWB RIGHT LOWER EXTREMITY  Secondary Diagnosis:	Urinary retention  Goal:	VOID  Assessment and plan of treatment:	NEWMAN D/C THIS AM ... MONITOR URINE OUTPUT/ FOR SIGNS OF RETENTION  Secondary Diagnosis:	UTI (urinary tract infection)  Goal:	ANTIBIOTICS  Assessment and plan of treatment:	FOLLOW UP URINE CXS FOR SENSITIVITIES, CONTINUE ON MACROBID FOR NOW

## 2018-11-26 NOTE — DISCHARGE NOTE ADULT - MEDICATION SUMMARY - MEDICATIONS TO TAKE
I will START or STAY ON the medications listed below when I get home from the hospital:    Aspir 81 oral delayed release tablet  -- 1 tab(s) by mouth once a day  -- Indication: For home med    oxyCODONE-acetaminophen 5 mg-325 mg oral tablet  -- 1 tab(s) by mouth every 4 hours, As needed, Severe Pain (7 - 10)  -- Indication: For pain     enoxaparin  -- 40 milligram(s) subcutaneous once a day  -- Indication: For dvt prevention     atenolol-chlorthalidone 50 mg-25 mg oral tablet  -- 1 tab(s) by mouth once a day  -- Indication: For home med    losartan-hydrochlorothiazide 50mg-12.5mg oral tablet  -- 1 tab(s) by mouth once a day  -- Indication: For home med    docusate sodium 100 mg oral capsule  -- 1 cap(s) by mouth 3 times a day  -- Indication: For stool softener    nitrofurantoin macrocrystals-monohydrate 100 mg oral capsule  -- 1 cap(s) by mouth 2 times a day (with meals)  -- Indication: For UTI (urinary tract infection)

## 2018-11-26 NOTE — PROGRESS NOTE ADULT - SUBJECTIVE AND OBJECTIVE BOX
s/p orif right distal femur pod 5     Patient seen and examined at at bedside in the am. No acute events overnight.     Vital Signs Last 24 Hrs  T(C): 36.7 (25 Nov 2018 23:00), Max: 36.7 (25 Nov 2018 23:00)  T(F): 98 (25 Nov 2018 23:00), Max: 98 (25 Nov 2018 23:00)  HR: 73 (26 Nov 2018 05:13) (66 - 73)  BP: 155/70 (26 Nov 2018 05:13) (107/55 - 155/70)  BP(mean): --  RR: 18 (25 Nov 2018 23:00) (18 - 18)  SpO2: --                          7.4    10.75 )-----------( 366      ( 26 Nov 2018 06:11 )             23.5     PE  NAD, AOx3  Abd soft, nt, nd, no guarding  RLE  staples in place drainage from proximal incision   ehl fhl intact  silt  wwp  comps soft    POD5 R PPFF,     NWB  pain control  dvt ppx  Gen surg notes appreciated   f/u labs  dispo pending

## 2018-11-26 NOTE — CONSULT NOTE ADULT - SUBJECTIVE AND OBJECTIVE BOX
AGUSTÍN TURCIOS  78y, Female  Allergy: penicillin (Rash)    HPI:  77 yo F s/p fall from standing (tripped and fell) on R side, hit her R knee. No head injury, no LOC. Presented with stable vitals. R knee swollen.  s/p ORIF R distal femur    PMH/PSH: b/l knee replacement, R hip replacement  ALL: PCN  Meds: ASA, losartan, chlorthalidone, atenolol, HCTZ (2018 15:06)    FAMILY HISTORY:  No pertinent family history in first degree relatives    PAST MEDICAL & SURGICAL HISTORY:  Mitral valve disorder  Other irritable bowel syndrome  H/O knee surgery  History of hip surgery  Hypertension  No significant past surgical history    VITALS:  T(F): 97.4, Max: 98 (18 @ 23:00)  HR: 68  BP: 137/64  RR: 18  Vital Signs Last 24 Hrs  T(C): 36.3 (2018 08:00), Max: 36.7 (2018 23:00)  T(F): 97.4 (2018 08:00), Max: 98 (2018 23:00)  HR: 68 (2018 08:00) (66 - 73)  BP: 137/64 (2018 08:00) (107/55 - 155/70)  BP(mean): --  RR: 18 (2018 08:00) (18 - 18)  SpO2: --    TESTS & MEASUREMENTS:   WBC Count: 12.13 K/uL (11.19.18 @ 12:12)  WBC Count: 8.21 K/uL (11.21.18 @ 00:32)  WBC Count: 15.83 K/uL (11.22.18 @ 00:10)  WBC Count: 14.96 K/uL (11.24.18 @ 09:40)  WBC Count: 13.27 K/uL (11.25.18 @ 02:10)                       7.4    10.75 )-----------( 366      ( 2018 06:11 )             23.5         140  |  104  |  39<H>  ----------------------------<  84  3.9   |  20  |  1.3    Ca    8.3<L>      2018 06:11    Urinalysis Basic - ( 2018 04:50 )    Color: Yellow / Appearance: Cloudy / S.015 / pH: x  Gluc: x / Ketone: Negative  / Bili: Negative / Urobili: 0.2 mg/dL   Blood: x / Protein: 30 mg/dL / Nitrite: Positive   Leuk Esterase: Large / RBC: 11-25 /HPF / WBC >50 /HPF   Sq Epi: x / Non Sq Epi: x / Bacteria: Moderate /HPF    Urine culture collection pending.    RADIOLOGY & ADDITIONAL TESTS:    ANTIBIOTICS:  nitrofurantoin monohydrate/macrocrystals (MACROBID) 100 milliGRAM(s) Oral two times a day with meals

## 2018-11-26 NOTE — DISCHARGE NOTE ADULT - PLAN OF CARE
RETURN TO PREVIOUS LEVEL OF INDEPENDENCE NWB RIGHT LOWER EXTREMITY VOID TRENT D/C THIS AM ... MONITOR URINE OUTPUT/ FOR SIGNS OF RETENTION ANTIBIOTICS FOLLOW UP URINE CXS FOR SENSITIVITIES, CONTINUE ON MACROBID FOR NOW

## 2018-11-27 ENCOUNTER — OUTPATIENT (OUTPATIENT)
Dept: OUTPATIENT SERVICES | Facility: HOSPITAL | Age: 78
LOS: 1 days | Discharge: HOME | End: 2018-11-27

## 2018-11-27 DIAGNOSIS — R79.9 ABNORMAL FINDING OF BLOOD CHEMISTRY, UNSPECIFIED: ICD-10-CM

## 2018-11-28 ENCOUNTER — INPATIENT (INPATIENT)
Facility: HOSPITAL | Age: 78
LOS: 6 days | Discharge: NURSING, REHAB OR LONG-TERM CARE | End: 2018-12-05
Attending: INTERNAL MEDICINE | Admitting: INTERNAL MEDICINE
Payer: MEDICARE

## 2018-11-28 VITALS
RESPIRATION RATE: 18 BRPM | DIASTOLIC BLOOD PRESSURE: 63 MMHG | HEART RATE: 83 BPM | SYSTOLIC BLOOD PRESSURE: 143 MMHG | OXYGEN SATURATION: 98 % | WEIGHT: 178.57 LBS | TEMPERATURE: 97 F

## 2018-11-28 DIAGNOSIS — I48.91 UNSPECIFIED ATRIAL FIBRILLATION: ICD-10-CM

## 2018-11-28 DIAGNOSIS — I10 ESSENTIAL (PRIMARY) HYPERTENSION: ICD-10-CM

## 2018-11-28 DIAGNOSIS — Z79.82 LONG TERM (CURRENT) USE OF ASPIRIN: ICD-10-CM

## 2018-11-28 DIAGNOSIS — N39.0 URINARY TRACT INFECTION, SITE NOT SPECIFIED: ICD-10-CM

## 2018-11-28 DIAGNOSIS — M97.11XA PERIPROSTHETIC FRACTURE AROUND INTERNAL PROSTHETIC RIGHT KNEE JOINT, INITIAL ENCOUNTER: ICD-10-CM

## 2018-11-28 DIAGNOSIS — Z96.653 PRESENCE OF ARTIFICIAL KNEE JOINT, BILATERAL: ICD-10-CM

## 2018-11-28 DIAGNOSIS — S72.451A DISPLACED SUPRACONDYLAR FRACTURE WITHOUT INTRACONDYLAR EXTENSION OF LOWER END OF RIGHT FEMUR, INITIAL ENCOUNTER FOR CLOSED FRACTURE: ICD-10-CM

## 2018-11-28 DIAGNOSIS — R33.9 RETENTION OF URINE, UNSPECIFIED: ICD-10-CM

## 2018-11-28 DIAGNOSIS — Z96.643 PRESENCE OF ARTIFICIAL HIP JOINT, BILATERAL: ICD-10-CM

## 2018-11-28 DIAGNOSIS — K59.00 CONSTIPATION, UNSPECIFIED: ICD-10-CM
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
## 2018-11-28 DIAGNOSIS — Z88.0 ALLERGY STATUS TO PENICILLIN: ICD-10-CM
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
## 2018-11-28 DIAGNOSIS — M97.01XA PERIPROSTHETIC FRACTURE AROUND INTERNAL PROSTHETIC RIGHT HIP JOINT, INITIAL ENCOUNTER: ICD-10-CM

## 2018-11-28 DIAGNOSIS — K56.7 ILEUS, UNSPECIFIED: ICD-10-CM

## 2018-11-28 PROBLEM — Z00.00 ENCOUNTER FOR PREVENTIVE HEALTH EXAMINATION: Status: ACTIVE | Noted: 2018-11-28

## 2018-11-28 LAB
-  AMIKACIN: SIGNIFICANT CHANGE UP
-  AMIKACIN: SIGNIFICANT CHANGE UP
-  AMOXICILLIN/CLAVULANIC ACID: SIGNIFICANT CHANGE UP
-  AMOXICILLIN/CLAVULANIC ACID: SIGNIFICANT CHANGE UP
-  AMPICILLIN/SULBACTAM: SIGNIFICANT CHANGE UP
-  AMPICILLIN/SULBACTAM: SIGNIFICANT CHANGE UP
-  AMPICILLIN: SIGNIFICANT CHANGE UP
-  AMPICILLIN: SIGNIFICANT CHANGE UP
-  AZTREONAM: SIGNIFICANT CHANGE UP
-  AZTREONAM: SIGNIFICANT CHANGE UP
-  CEFAZOLIN: SIGNIFICANT CHANGE UP
-  CEFAZOLIN: SIGNIFICANT CHANGE UP
-  CEFEPIME: SIGNIFICANT CHANGE UP
-  CEFEPIME: SIGNIFICANT CHANGE UP
-  CEFOXITIN: SIGNIFICANT CHANGE UP
-  CEFOXITIN: SIGNIFICANT CHANGE UP
-  CEFTRIAXONE: SIGNIFICANT CHANGE UP
-  CEFTRIAXONE: SIGNIFICANT CHANGE UP
-  CIPROFLOXACIN: SIGNIFICANT CHANGE UP
-  CIPROFLOXACIN: SIGNIFICANT CHANGE UP
-  ERTAPENEM: SIGNIFICANT CHANGE UP
-  ERTAPENEM: SIGNIFICANT CHANGE UP
-  GENTAMICIN: SIGNIFICANT CHANGE UP
-  GENTAMICIN: SIGNIFICANT CHANGE UP
-  IMIPENEM: SIGNIFICANT CHANGE UP
-  LEVOFLOXACIN: SIGNIFICANT CHANGE UP
-  LEVOFLOXACIN: SIGNIFICANT CHANGE UP
-  MEROPENEM: SIGNIFICANT CHANGE UP
-  MEROPENEM: SIGNIFICANT CHANGE UP
-  NITROFURANTOIN: SIGNIFICANT CHANGE UP
-  NITROFURANTOIN: SIGNIFICANT CHANGE UP
-  PIPERACILLIN/TAZOBACTAM: SIGNIFICANT CHANGE UP
-  PIPERACILLIN/TAZOBACTAM: SIGNIFICANT CHANGE UP
-  TIGECYCLINE: SIGNIFICANT CHANGE UP
-  TOBRAMYCIN: SIGNIFICANT CHANGE UP
-  TOBRAMYCIN: SIGNIFICANT CHANGE UP
-  TRIMETHOPRIM/SULFAMETHOXAZOLE: SIGNIFICANT CHANGE UP
-  TRIMETHOPRIM/SULFAMETHOXAZOLE: SIGNIFICANT CHANGE UP
ALBUMIN SERPL ELPH-MCNC: 2.7 G/DL — LOW (ref 3.5–5.2)
ALP SERPL-CCNC: 66 U/L — SIGNIFICANT CHANGE UP (ref 30–115)
ALT FLD-CCNC: 22 U/L — SIGNIFICANT CHANGE UP (ref 0–41)
ANION GAP SERPL CALC-SCNC: 15 MMOL/L — HIGH (ref 7–14)
APTT BLD: 30.9 SEC — SIGNIFICANT CHANGE UP (ref 27–39.2)
APTT BLD: 39.9 SEC — HIGH (ref 27–39.2)
AST SERPL-CCNC: 74 U/L — HIGH (ref 0–41)
BASOPHILS # BLD AUTO: 0.03 K/UL — SIGNIFICANT CHANGE UP (ref 0–0.2)
BASOPHILS NFR BLD AUTO: 0.3 % — SIGNIFICANT CHANGE UP (ref 0–1)
BILIRUB SERPL-MCNC: 0.6 MG/DL — SIGNIFICANT CHANGE UP (ref 0.2–1.2)
BUN SERPL-MCNC: 33 MG/DL — HIGH (ref 10–20)
CALCIUM SERPL-MCNC: 8.9 MG/DL — SIGNIFICANT CHANGE UP (ref 8.5–10.1)
CHLORIDE SERPL-SCNC: 100 MMOL/L — SIGNIFICANT CHANGE UP (ref 98–110)
CO2 SERPL-SCNC: 24 MMOL/L — SIGNIFICANT CHANGE UP (ref 17–32)
CREAT SERPL-MCNC: 1 MG/DL — SIGNIFICANT CHANGE UP (ref 0.7–1.5)
CULTURE RESULTS: SIGNIFICANT CHANGE UP
EOSINOPHIL # BLD AUTO: 0.48 K/UL — SIGNIFICANT CHANGE UP (ref 0–0.7)
EOSINOPHIL NFR BLD AUTO: 4.2 % — SIGNIFICANT CHANGE UP (ref 0–8)
GLUCOSE SERPL-MCNC: 101 MG/DL — HIGH (ref 70–99)
HCT VFR BLD CALC: 26.5 % — LOW (ref 37–47)
HCT VFR BLD CALC: 27 % — LOW (ref 37–47)
HGB BLD-MCNC: 8.3 G/DL — LOW (ref 12–16)
HGB BLD-MCNC: 8.7 G/DL — LOW (ref 12–16)
IMM GRANULOCYTES NFR BLD AUTO: 1.5 % — HIGH (ref 0.1–0.3)
INR BLD: 1.01 RATIO — SIGNIFICANT CHANGE UP (ref 0.65–1.3)
LYMPHOCYTES # BLD AUTO: 1.54 K/UL — SIGNIFICANT CHANGE UP (ref 1.2–3.4)
LYMPHOCYTES # BLD AUTO: 13.5 % — LOW (ref 20.5–51.1)
MCHC RBC-ENTMCNC: 27.7 PG — SIGNIFICANT CHANGE UP (ref 27–31)
MCHC RBC-ENTMCNC: 28.2 PG — SIGNIFICANT CHANGE UP (ref 27–31)
MCHC RBC-ENTMCNC: 31.3 G/DL — LOW (ref 32–37)
MCHC RBC-ENTMCNC: 32.2 G/DL — SIGNIFICANT CHANGE UP (ref 32–37)
MCV RBC AUTO: 87.7 FL — SIGNIFICANT CHANGE UP (ref 81–99)
MCV RBC AUTO: 88.3 FL — SIGNIFICANT CHANGE UP (ref 81–99)
METHOD TYPE: SIGNIFICANT CHANGE UP
METHOD TYPE: SIGNIFICANT CHANGE UP
MONOCYTES # BLD AUTO: 1.31 K/UL — HIGH (ref 0.1–0.6)
MONOCYTES NFR BLD AUTO: 11.5 % — HIGH (ref 1.7–9.3)
NEUTROPHILS # BLD AUTO: 7.91 K/UL — HIGH (ref 1.4–6.5)
NEUTROPHILS NFR BLD AUTO: 69 % — SIGNIFICANT CHANGE UP (ref 42.2–75.2)
NRBC # BLD: 0 /100 WBCS — SIGNIFICANT CHANGE UP (ref 0–0)
NRBC # BLD: 0 /100 WBCS — SIGNIFICANT CHANGE UP (ref 0–0)
ORGANISM # SPEC MICROSCOPIC CNT: SIGNIFICANT CHANGE UP
PLATELET # BLD AUTO: 457 K/UL — HIGH (ref 130–400)
PLATELET # BLD AUTO: 505 K/UL — HIGH (ref 130–400)
POTASSIUM SERPL-MCNC: 3.9 MMOL/L — SIGNIFICANT CHANGE UP (ref 3.5–5)
POTASSIUM SERPL-SCNC: 3.9 MMOL/L — SIGNIFICANT CHANGE UP (ref 3.5–5)
PROT SERPL-MCNC: 5.7 G/DL — LOW (ref 6–8)
PROTHROM AB SERPL-ACNC: 11.6 SEC — SIGNIFICANT CHANGE UP (ref 9.95–12.87)
RBC # BLD: 3 M/UL — LOW (ref 4.2–5.4)
RBC # BLD: 3.08 M/UL — LOW (ref 4.2–5.4)
RBC # FLD: 15.9 % — HIGH (ref 11.5–14.5)
RBC # FLD: 15.9 % — HIGH (ref 11.5–14.5)
SODIUM SERPL-SCNC: 139 MMOL/L — SIGNIFICANT CHANGE UP (ref 135–146)
SPECIMEN SOURCE: SIGNIFICANT CHANGE UP
TROPONIN T SERPL-MCNC: 1.31 NG/ML — CRITICAL HIGH
WBC # BLD: 11.15 K/UL — HIGH (ref 4.8–10.8)
WBC # BLD: 11.44 K/UL — HIGH (ref 4.8–10.8)
WBC # FLD AUTO: 11.15 K/UL — HIGH (ref 4.8–10.8)
WBC # FLD AUTO: 11.44 K/UL — HIGH (ref 4.8–10.8)

## 2018-11-28 RX ORDER — ASPIRIN/CALCIUM CARB/MAGNESIUM 324 MG
81 TABLET ORAL DAILY
Qty: 0 | Refills: 0 | Status: DISCONTINUED | OUTPATIENT
Start: 2018-11-28 | End: 2018-12-05

## 2018-11-28 RX ORDER — ASPIRIN/CALCIUM CARB/MAGNESIUM 324 MG
81 TABLET ORAL ONCE
Qty: 0 | Refills: 0 | Status: COMPLETED | OUTPATIENT
Start: 2018-11-28 | End: 2018-11-28

## 2018-11-28 RX ORDER — METOPROLOL TARTRATE 50 MG
12.5 TABLET ORAL
Qty: 0 | Refills: 0 | Status: DISCONTINUED | OUTPATIENT
Start: 2018-11-28 | End: 2018-12-05

## 2018-11-28 RX ORDER — DOCUSATE SODIUM 100 MG
100 CAPSULE ORAL DAILY
Qty: 0 | Refills: 0 | Status: DISCONTINUED | OUTPATIENT
Start: 2018-11-28 | End: 2018-12-05

## 2018-11-28 RX ORDER — CLOPIDOGREL BISULFATE 75 MG/1
75 TABLET, FILM COATED ORAL DAILY
Qty: 0 | Refills: 0 | Status: DISCONTINUED | OUTPATIENT
Start: 2018-11-28 | End: 2018-12-05

## 2018-11-28 RX ORDER — SODIUM CHLORIDE 9 MG/ML
750 INJECTION INTRAMUSCULAR; INTRAVENOUS; SUBCUTANEOUS
Qty: 0 | Refills: 0 | Status: DISCONTINUED | OUTPATIENT
Start: 2018-11-28 | End: 2018-11-30

## 2018-11-28 RX ORDER — CLOPIDOGREL BISULFATE 75 MG/1
600 TABLET, FILM COATED ORAL ONCE
Qty: 0 | Refills: 0 | Status: COMPLETED | OUTPATIENT
Start: 2018-11-28 | End: 2018-11-28

## 2018-11-28 RX ORDER — HEPARIN SODIUM 5000 [USP'U]/ML
4000 INJECTION INTRAVENOUS; SUBCUTANEOUS ONCE
Qty: 0 | Refills: 0 | Status: DISCONTINUED | OUTPATIENT
Start: 2018-11-28 | End: 2018-11-28

## 2018-11-28 RX ORDER — HEPARIN SODIUM 5000 [USP'U]/ML
4000 INJECTION INTRAVENOUS; SUBCUTANEOUS EVERY 6 HOURS
Qty: 0 | Refills: 0 | Status: DISCONTINUED | OUTPATIENT
Start: 2018-11-28 | End: 2018-11-28

## 2018-11-28 RX ORDER — HEPARIN SODIUM 5000 [USP'U]/ML
INJECTION INTRAVENOUS; SUBCUTANEOUS
Qty: 25000 | Refills: 0 | Status: DISCONTINUED | OUTPATIENT
Start: 2018-11-28 | End: 2018-11-28

## 2018-11-28 RX ORDER — ATORVASTATIN CALCIUM 80 MG/1
80 TABLET, FILM COATED ORAL AT BEDTIME
Qty: 0 | Refills: 0 | Status: DISCONTINUED | OUTPATIENT
Start: 2018-11-28 | End: 2018-12-05

## 2018-11-28 RX ADMIN — SODIUM CHLORIDE 75 MILLILITER(S): 9 INJECTION INTRAMUSCULAR; INTRAVENOUS; SUBCUTANEOUS at 18:35

## 2018-11-28 RX ADMIN — CLOPIDOGREL BISULFATE 600 MILLIGRAM(S): 75 TABLET, FILM COATED ORAL at 06:20

## 2018-11-28 RX ADMIN — ATORVASTATIN CALCIUM 80 MILLIGRAM(S): 80 TABLET, FILM COATED ORAL at 22:07

## 2018-11-28 RX ADMIN — HEPARIN SODIUM 1200 UNIT(S)/HR: 5000 INJECTION INTRAVENOUS; SUBCUTANEOUS at 13:24

## 2018-11-28 RX ADMIN — HEPARIN SODIUM 1000 UNIT(S)/HR: 5000 INJECTION INTRAVENOUS; SUBCUTANEOUS at 06:01

## 2018-11-28 RX ADMIN — Medication 81 MILLIGRAM(S): at 03:47

## 2018-11-28 NOTE — CONSULT NOTE ADULT - SUBJECTIVE AND OBJECTIVE BOX
Date of Admission: 11/28    CHIEF COMPLAINT: chest pain    HISTORY OF PRESENT ILLNESS: 78yFemale with PMH below presented to the hospital for chest pain that radiated up her jaw and into her left arm while she was performing PT. She took two aspirin earlier and her pain resolved. She states that she thought it was from manipulation during her PT but got better with aspirin. She denies similar pain previously. She currently endorses pain only when she takes a deep breath and feels a soreness. Denies shortness of breath. I spoke with the orthopedic resident on call at [phone] and he agreed with plavix and urgent cardiac catheterization.     PAST MEDICAL & SURGICAL HISTORY:  Arthritis  Mitral valve disorder  Other irritable bowel syndrome  H/O knee surgery  History of hip surgery  Hypertension  No significant past surgical history    HEALTH ISSUES - PROBLEM Dx:        FAMILY HISTORY:  No pertinent family history in first degree relatives    None [ ]  Mother:   Father:   Siblings: brother with CAD and PCI    SOCIAL HISTORY:    [ ] Non-smoker  [ ] Smoker  [ ] Alcohol    Allergies    penicillin (Rash)    Intolerances    	    REVIEW OF SYSTEMS:  CONSTITUTIONAL: No fever, weight loss, or fatigue  CARDIOLOGY: see HPI.  RESPIRATORY: see HPI.  NEUROLOGICAL: NO weakness, no focal deficits to report.  ENDOCRINOLOGICAL: no recent change in diabetic medications.   GI: no BRBPR, no N,V,diarrhea.    PSYCHIATRY: normal mood and affect  HEENT: no nasal discharge, no ecchymosis  SKIN: no ecchymosis, no breakdown  MUSCULOSKELETAL: Full range of motion x4.      PHYSICAL EXAM:  T(C): 36.3 (11-28-18 @ 02:50), Max: 36.3 (11-28-18 @ 02:50)  HR: 83 (11-28-18 @ 02:50) (83 - 83)  BP: 143/63 (11-28-18 @ 02:50) (143/63 - 143/63)  RR: 18 (11-28-18 @ 02:50) (18 - 18)  SpO2: 98% (11-28-18 @ 02:50) (98% - 98%)  Wt(kg): --  I&O's Summary    Daily     Daily     General Appearance: Normal for age and gender  Cardiovascular: Normal S1 S2, No JVD, No murmurs, No edema  Respiratory: Lungs clear to auscultation	  Psychiatry: A & O x 3, Mood & affect appropriate  Gastrointestinal:  Soft, Non-tender  Skin: No rashes, No ecchymoses, No cyanosis	  Neurologic: Non-focal  Musculoskeletal/ extremities: undergoing rehab for R hip fracture. + RLE wound.   Vascular: Peripheral pulses palpable 2+ bilaterally    LABS:	 	                          8.3    11.44 )-----------( 505      ( 28 Nov 2018 03:29 )             26.5     11-28    139  |  100  |  33<H>  ----------------------------<  101<H>  3.9   |  24  |  1.0    Ca    8.9      28 Nov 2018 03:29    TPro  5.7<L>  /  Alb  2.7<L>  /  TBili  0.6  /  DBili  x   /  AST  74<H>  /  ALT  22  /  AlkPhos  66  11-28    CARDIAC MARKERS ( 28 Nov 2018 03:29 )  x     / 1.31 ng/mL / x     / x     / x          PT/INR - ( 28 Nov 2018 03:29 )   PT: 11.60 sec;   INR: 1.01 ratio         PTT - ( 28 Nov 2018 03:29 )  PTT:30.9 sec    CARDIAC MARKERS:            TELEMETRY EVENTS: 	    ECG: ST depressions in V2 and V3 with borderline DOLORES in inferior leads.  	  RADIOLOGY:  OTHER: 	    PREVIOUS DIAGNOSTIC TESTING:    [ ] Echocardiogram:  [ ]  Catheterization:  [ ] Stress Test:  	  	    Home Medications:  Aspir 81 oral delayed release tablet: 1 tab(s) orally once a day (19 Nov 2018 15:42)  atenolol-chlorthalidone 50 mg-25 mg oral tablet: 1 tab(s) orally once a day (19 Nov 2018 15:42)  docusate sodium 100 mg oral capsule: 1 cap(s) orally 3 times a day (26 Nov 2018 15:19)  enoxaparin: 40 milligram(s) subcutaneous once a day (26 Nov 2018 15:19)  losartan-hydrochlorothiazide 50mg-12.5mg oral tablet: 1 tab(s) orally once a day (19 Nov 2018 15:42)  nitrofurantoin macrocrystals-monohydrate 100 mg oral capsule: 1 cap(s) orally 2 times a day (with meals) (26 Nov 2018 15:19)  oxyCODONE-acetaminophen 5 mg-325 mg oral tablet: 1 tab(s) orally every 4 hours, As needed, Severe Pain (7 - 10) (26 Nov 2018 15:19)    MEDICATIONS  (STANDING):  clopidogrel Tablet 600 milliGRAM(s) Oral Once    MEDICATIONS  (PRN):

## 2018-11-28 NOTE — H&P ADULT - HISTORY OF PRESENT ILLNESS
78yFemale with PMH below presented to the hospital for chest pain that radiated up her jaw and into her left arm while she was performing PT. She took two aspirin earlier and her pain resolved. She states that she thought it was from manipulation during her PT but got better with aspirin. She denies similar pain previously. She currently endorses pain only when she takes a deep breath and feels a soreness. Denies shortness of breath.   found to have  ST depressions in V2 and V3 with borderline DOLORES in inferior leads.  	  ROS negative except as above

## 2018-11-28 NOTE — ED PROVIDER NOTE - NS ED ROS FT
Review of Systems    Constitutional: (-) fever  Eyes/ENT: (-) change in vision, (-) sore throat, (-) ear pain  Cardiovascular: (-) palpitation   Respiratory: (-) cough  Gastrointestinal: (-) abdominal pain (-) vomiting, (-) diarrhea  Musculoskeletal: (-) neck pain, (-) back pain, (-) joint pain  Integumentary: (-) rash, (-) edema  Neurological: (-) headache, (-) altered mental status  Heme/Lymph: (-) easy bruising (-) easy bleeding (-) lymphadenopathy  Allergic/Immunologic: (-) pruritus

## 2018-11-28 NOTE — ED PROVIDER NOTE - OBJECTIVE STATEMENT
79 yo f pmhx sig for htn and recent l femur sgx repair for fx in rehab reports with left sided chest pain that began in the AM while the pt was exercising in the PT. Pt reports the CP was L sided radiating to the l neck and shoulder associated with nausea w/o vomiting. Denies LOC, diaphoresis, syncope, and presyncope.    I have reviewed available current nursing and previous documentation of past medical, surgical, family, and/or social history. 79 yo f pmhx sig for htn and recent l femur sgx repair for fx in rehab reports with left sided chest pain that began in the AM while the pt was exercising in the PT. Pt reports the CP was L sided radiating to the l neck and shoulder associated with nausea w/o vomiting. The CP was relieved with 2 ASA  81 mg given in the assisted living facility. Denies LOC, diaphoresis, syncope, and presyncope.    I have reviewed available current nursing and previous documentation of past medical, surgical, family, and/or social history.

## 2018-11-28 NOTE — ED PROVIDER NOTE - PHYSICAL EXAMINATION
Physical Exam    Vital Signs: I have reviewed the initial vital signs.  Constitutional: well-nourished, appears stated age, no acute distress  Eyes: PERRLA, EOM intact, RAPD absent, no conjunctival injection, and symmetrical lids.  ENT: Neck supple with no adenopathy, moist MM.  Cardiovascular: regular rate, regular rhythm, well-perfused extremities  Respiratory: unlabored respiratory effort, clear to auscultation bilaterally  Gastrointestinal: soft, non-tender abdomen, no pulsatile mass  Musculoskeletal: supple neck, no lower extremity edema  Integumentary: warm, dry, no rash. +wound on left dorsal foot. +r knee brace.  Neurologic: awake, alert, extremities’ motor and sensory functions grossly intact  Psychiatric: A&Ox3

## 2018-11-28 NOTE — ED ADULT NURSE NOTE - NSIMPLEMENTINTERV_GEN_ALL_ED
Implemented All Fall with Harm Risk Interventions:  [place] to call system. Call bell, personal items and telephone within reach. Instruct patient to call for assistance. Room bathroom lighting operational. Non-slip footwear when patient is off stretcher. Physically safe environment: no spills, clutter or unnecessary equipment. Stretcher in lowest position, wheels locked, appropriate side rails in place. Provide visual cue, wrist band, yellow gown, etc. Monitor gait and stability. Monitor for mental status changes and reorient to person, place, and time. Review medications for side effects contributing to fall risk. Reinforce activity limits and safety measures with patient and family. Provide visual clues: red socks.

## 2018-11-28 NOTE — PATIENT PROFILE ADULT - HOME ACCESSIBILITY CONCERNS
81 yo  female with PMH of HTN, HLD, breast ca s/p L radical mastectomy on anastrazole, R arterial thrombus, GBM s/p Right parietal craniotomy, not on therapy (consistent with goals of care) who presents with abdominal pain for 4-6 week, seeking comfort care and hospice evaluation
stairs to enter home/stairs within home

## 2018-11-28 NOTE — H&P ADULT - ASSESSMENT
78yFemale with PMH below presented to the hospital for typical chest pain. recent h/o surgery for # rt femur.      Missed STEMI  - pain started at 10 AM yesterday and not brought into ER until 2AM from [facility]  - DAPT  - heparin drip  - cw CCU monitoring  - Cath today  - 2d echo  - lipitor 80 mg QHS  - continue antihypertensive medications  - if pain re-occurs, call cardiology fellow and plan for emergent re-vascularization    Diet: dash/tlc  dvt ppx: heparin drip    Activity: bedrest  Code: full code

## 2018-11-28 NOTE — CHART NOTE - NSCHARTNOTEFT_GEN_A_CORE
PRE-OP DIAGNOSIS: NSTEMI    PROCEDURE: [facility] with coronary angiography    Physician: Dr Rodrigues  Assistant: Gallo    ANESTHESIA TYPE:  [  ]General Anesthesia  [ x ] Sedation  [  ] Local/Regional    ESTIMATED BLOOD LOSS:    10   mL    CONDITION  [  ] Critical  [  ] Serious  [  ]Fair  [ x ]Good      IV CONTRAST:      225       mL    FINDINGS    Left Heart Catheterization:  LVEF%: 55 %  LVEDP: mild elevation      ACCESS:    [x ] right radial artery : D stat applied  [ x] right femoral artery  Angioseal used    LEFT HEART CATHETERIZATION                                    Left main no disease  LAD:   mid LAD 80 % lesion                      Left Circumflex: 80% lesion before OM1  Right Coronary Artery: large dominant , no disease    INTERVENTION  IMPLANTS: COBRA stent in prox Circ and mid LAD      POST-OP DIAGNOSIS  NSTEMI : 2 vessels CAD : PCI to mid LAD ( 2.5 24mm COBRA stent ) , prox Circ (3.5 30mm COBRA stent)      PLAN OF CARE  [ ] D/C Home today  [ ]  D/C in AM  [x ] Return to In-patient bed  [ ] Admit for observation  [ ] Return for staged procedure:  [ ] CT Surgery consult called  [x ]  Continue DAPT, B-blocker & Statin therapy PRE-OP DIAGNOSIS: NSTEMI    PROCEDURE: [facility] with coronary angiography    Physician: Dr Rodrigues  Assistant: Gallo    ANESTHESIA TYPE:  [  ]General Anesthesia  [ x ] Sedation  [  ] Local/Regional    ESTIMATED BLOOD LOSS:    10   mL    CONDITION  [  ] Critical  [  ] Serious  [  ]Fair  [ x ]Good      IV CONTRAST:      225       mL    FINDINGS    Left Heart Catheterization:  LVEF%: 55 %  LVEDP: mild elevation      ACCESS:    [x ] right radial artery : D stat applied  [ x] right femoral artery  Angioseal used    LEFT HEART CATHETERIZATION                                    Left main no disease  LAD:   mid LAD 80 % lesion                      Left Circumflex: 80% lesion before OM1  Right Coronary Artery: large dominant , no disease    INTERVENTION  IMPLANTS: COBRA stent in prox Circ and mid LAD      POST-OP DIAGNOSIS  NSTEMI : 2 vessels CAD : PCI to mid LAD ( 2.5 24mm COBRA stent ) , prox Circ (3.5 30mm COBRA stent)      PLAN OF CARE  [ ] D/C Home today  [ ]  D/C in AM  [x ] Return to In-patient bed  [ ] Admit for observation  [ ] Return for staged procedure:  [ ] CT Surgery consult called  [x ]  Continue DAPT, B-blocker & Statin therapy    Pt can be on antiplatlet for 2-4 weeks. if any bleeding issues.  Do not transfuse this pt unless you speak with fellow or attending or unless emergency.

## 2018-11-28 NOTE — ED ADULT NURSE NOTE - PMH
Arthritis    H/O knee surgery    History of hip surgery    Hypertension    Mitral valve disorder    Other irritable bowel syndrome

## 2018-11-28 NOTE — H&P ADULT - NSHPPHYSICALEXAM_GEN_ALL_CORE
T(C): 36.3 (11-28-18 @ 02:50), Max: 36.3 (11-28-18 @ 02:50)  HR: 83 (11-28-18 @ 02:50) (83 - 83)  BP: 143/63 (11-28-18 @ 02:50) (143/63 - 143/63)  RR: 18 (11-28-18 @ 02:50) (18 - 18)  SpO2: 98%

## 2018-11-28 NOTE — ED ADULT TRIAGE NOTE - CHIEF COMPLAINT QUOTE
Patient BIBA from [facility] for midsternal chest pain radiating to the jaw starting after therapy.

## 2018-11-28 NOTE — ED ADULT NURSE NOTE - ED STAT RN HANDOFF DETAILS
as per cath lab to give report to ccu and pt will be taken to cath lab once md is available, pt Pt alert and orientedx3, VSS and afebrile. pt on cardiac monitor/contnious pulse ox. no active chest pain at this time. Safety maintained and hourly rounding performed. Will continue with plan of care.

## 2018-11-28 NOTE — ED ADULT NURSE NOTE - CHIEF COMPLAINT QUOTE
Patient BIBA from [facility] for midsternal chest pain radiating to the jaw starting after therapy.

## 2018-11-28 NOTE — ED ADULT NURSE REASSESSMENT NOTE - NS ED NURSE REASSESS COMMENT FT1
Patient received by TITO Platt. patient upgraded to critical care for EKG changes. patient placed on monitor, vitals stable. Monitoring continues.

## 2018-11-28 NOTE — PROGRESS NOTE ADULT - SUBJECTIVE AND OBJECTIVE BOX
PREOPERATIVE DAY OF PROCEDURE EVALUATION:  I have personally seen and examined the patient.  I agree with the history and physical which I have reviewed and noted any changes below.  (Signed electronically by __________)  11-28-18 @ 15:05

## 2018-11-28 NOTE — ED PROVIDER NOTE - ATTENDING CONTRIBUTION TO CARE
79 y/o F with h/o b/l TKR, R THR, R prox femur - was d/c 1 day ago after a distal femur fx surgery. Was in rehab today.  L CP into L neck and shoulder and into arm this AM during PT.  Was given two baby ASA.  + nausea, no vomiting.  No diaphoresis. 79 y/o F with h/o b/l TKR, R THR, R prox femur - was d/c 1 day ago after a ORIF for distal femur fx. Was in rehab today.  L CP into L neck and shoulder and into arm this AM during PT.  Was given two baby ASA.  + nausea, no vomiting.  No diaphoresis. 79 y/o F with h/o b/l TKR, R THR, R prox femur - was d/c 1 day ago after a ORIF for distal femur fx. Was in rehab today.  L CP into L neck and shoulder and into arm this AM during PT.  Was given two baby ASA.  + nausea, no vomiting.  No diaphoresis.  Pt came tonight because pain persisted.  pt still with some pain here in ER, but only on lower L ribs when taking a deep breath.  EXAM: well appearing. NAD. s1s2, reg. CTAB. abd soft, nd, nt.  +R LE in knee immobilizer.  b/l LE edema.  P: labs, ekg, cxr, asa.

## 2018-11-29 DIAGNOSIS — Z02.9 ENCOUNTER FOR ADMINISTRATIVE EXAMINATIONS, UNSPECIFIED: ICD-10-CM

## 2018-11-29 LAB
ALBUMIN SERPL ELPH-MCNC: 2.5 G/DL — LOW (ref 3.5–5.2)
ALP SERPL-CCNC: 64 U/L — SIGNIFICANT CHANGE UP (ref 30–115)
ALT FLD-CCNC: 30 U/L — SIGNIFICANT CHANGE UP (ref 0–41)
ANION GAP SERPL CALC-SCNC: 18 MMOL/L — HIGH (ref 7–14)
APTT BLD: 29.5 SEC — SIGNIFICANT CHANGE UP (ref 27–39.2)
APTT BLD: 29.7 SEC — SIGNIFICANT CHANGE UP (ref 27–39.2)
APTT BLD: 32.9 SEC — SIGNIFICANT CHANGE UP (ref 27–39.2)
AST SERPL-CCNC: 79 U/L — HIGH (ref 0–41)
BASOPHILS # BLD AUTO: 0.02 K/UL — SIGNIFICANT CHANGE UP (ref 0–0.2)
BASOPHILS NFR BLD AUTO: 0.1 % — SIGNIFICANT CHANGE UP (ref 0–1)
BILIRUB SERPL-MCNC: 0.4 MG/DL — SIGNIFICANT CHANGE UP (ref 0.2–1.2)
BUN SERPL-MCNC: 33 MG/DL — HIGH (ref 10–20)
CALCIUM SERPL-MCNC: 7.9 MG/DL — LOW (ref 8.5–10.1)
CHLORIDE SERPL-SCNC: 99 MMOL/L — SIGNIFICANT CHANGE UP (ref 98–110)
CO2 SERPL-SCNC: 21 MMOL/L — SIGNIFICANT CHANGE UP (ref 17–32)
CREAT SERPL-MCNC: 1.6 MG/DL — HIGH (ref 0.7–1.5)
EOSINOPHIL # BLD AUTO: 0.04 K/UL — SIGNIFICANT CHANGE UP (ref 0–0.7)
EOSINOPHIL NFR BLD AUTO: 0.2 % — SIGNIFICANT CHANGE UP (ref 0–8)
GLUCOSE SERPL-MCNC: 106 MG/DL — HIGH (ref 70–99)
HCT VFR BLD CALC: 26 % — LOW (ref 37–47)
HGB BLD-MCNC: 8.3 G/DL — LOW (ref 12–16)
IMM GRANULOCYTES NFR BLD AUTO: 1.4 % — HIGH (ref 0.1–0.3)
LYMPHOCYTES # BLD AUTO: 0.92 K/UL — LOW (ref 1.2–3.4)
LYMPHOCYTES # BLD AUTO: 5.5 % — LOW (ref 20.5–51.1)
MAGNESIUM SERPL-MCNC: 1.7 MG/DL — LOW (ref 1.8–2.4)
MCHC RBC-ENTMCNC: 28.2 PG — SIGNIFICANT CHANGE UP (ref 27–31)
MCHC RBC-ENTMCNC: 31.9 G/DL — LOW (ref 32–37)
MCV RBC AUTO: 88.4 FL — SIGNIFICANT CHANGE UP (ref 81–99)
MONOCYTES # BLD AUTO: 1.08 K/UL — HIGH (ref 0.1–0.6)
MONOCYTES NFR BLD AUTO: 6.5 % — SIGNIFICANT CHANGE UP (ref 1.7–9.3)
NEUTROPHILS # BLD AUTO: 14.39 K/UL — HIGH (ref 1.4–6.5)
NEUTROPHILS NFR BLD AUTO: 86.3 % — HIGH (ref 42.2–75.2)
NRBC # BLD: 0 /100 WBCS — SIGNIFICANT CHANGE UP (ref 0–0)
PLATELET # BLD AUTO: 523 K/UL — HIGH (ref 130–400)
POTASSIUM SERPL-MCNC: 4.6 MMOL/L — SIGNIFICANT CHANGE UP (ref 3.5–5)
POTASSIUM SERPL-SCNC: 4.6 MMOL/L — SIGNIFICANT CHANGE UP (ref 3.5–5)
PROT SERPL-MCNC: 5.1 G/DL — LOW (ref 6–8)
RBC # BLD: 2.94 M/UL — LOW (ref 4.2–5.4)
RBC # FLD: 15.9 % — HIGH (ref 11.5–14.5)
SODIUM SERPL-SCNC: 138 MMOL/L — SIGNIFICANT CHANGE UP (ref 135–146)
WBC # BLD: 16.69 K/UL — HIGH (ref 4.8–10.8)
WBC # FLD AUTO: 16.69 K/UL — HIGH (ref 4.8–10.8)

## 2018-11-29 RX ORDER — MAGNESIUM SULFATE 500 MG/ML
2 VIAL (ML) INJECTION ONCE
Qty: 0 | Refills: 0 | Status: COMPLETED | OUTPATIENT
Start: 2018-11-29 | End: 2018-11-29

## 2018-11-29 RX ORDER — CHLORHEXIDINE GLUCONATE 213 G/1000ML
1 SOLUTION TOPICAL
Qty: 0 | Refills: 0 | Status: DISCONTINUED | OUTPATIENT
Start: 2018-11-29 | End: 2018-12-05

## 2018-11-29 RX ORDER — HEPARIN SODIUM 5000 [USP'U]/ML
5000 INJECTION INTRAVENOUS; SUBCUTANEOUS EVERY 8 HOURS
Qty: 0 | Refills: 0 | Status: DISCONTINUED | OUTPATIENT
Start: 2018-11-29 | End: 2018-12-05

## 2018-11-29 RX ORDER — SODIUM CHLORIDE 9 MG/ML
1000 INJECTION INTRAMUSCULAR; INTRAVENOUS; SUBCUTANEOUS
Qty: 0 | Refills: 0 | Status: DISCONTINUED | OUTPATIENT
Start: 2018-11-29 | End: 2018-11-30

## 2018-11-29 RX ADMIN — Medication 100 MILLIGRAM(S): at 12:35

## 2018-11-29 RX ADMIN — HEPARIN SODIUM 5000 UNIT(S): 5000 INJECTION INTRAVENOUS; SUBCUTANEOUS at 21:02

## 2018-11-29 RX ADMIN — Medication 12.5 MILLIGRAM(S): at 05:49

## 2018-11-29 RX ADMIN — Medication 81 MILLIGRAM(S): at 12:35

## 2018-11-29 RX ADMIN — SODIUM CHLORIDE 75 MILLILITER(S): 9 INJECTION INTRAMUSCULAR; INTRAVENOUS; SUBCUTANEOUS at 14:00

## 2018-11-29 RX ADMIN — HEPARIN SODIUM 5000 UNIT(S): 5000 INJECTION INTRAVENOUS; SUBCUTANEOUS at 14:32

## 2018-11-29 RX ADMIN — Medication 50 GRAM(S): at 14:27

## 2018-11-29 RX ADMIN — ATORVASTATIN CALCIUM 80 MILLIGRAM(S): 80 TABLET, FILM COATED ORAL at 21:02

## 2018-11-29 RX ADMIN — CLOPIDOGREL BISULFATE 75 MILLIGRAM(S): 75 TABLET, FILM COATED ORAL at 12:35

## 2018-11-29 NOTE — PROGRESS NOTE ADULT - SUBJECTIVE AND OBJECTIVE BOX
SUBJECTIVE:    Patient is a 78y old Female who presents with a chief complaint of chest pain (28 Nov 2018 15:05)    Currently admitted to medicine with the primary diagnosis of STEMI (ST ELEVATION MYOCARDIAL INFARCTION)    Today is hospital day 1d.  low blood pressure ( 1 episode SBP 90s), but normal after lying on bed, likely vasovagal    HPI:  78yFemale with PMH below presented to the hospital for chest pain that radiated up her jaw and into her left arm while she was performing PT. She took two aspirin earlier and her pain resolved. She states that she thought it was from manipulation during her PT but got better with aspirin. She denies similar pain previously. She currently endorses pain only when she takes a deep breath and feels a soreness. Denies shortness of breath.   found to have  ST depressions in V2 and V3 with borderline DOLORES in inferior leads.  	  ROS negative except as above (28 Nov 2018 13:33)      PAST MEDICAL & SURGICAL HISTORY  Arthritis  Mitral valve disorder  Other irritable bowel syndrome  H/O knee surgery  History of hip surgery  Hypertension  No significant past surgical history     ALLERGIES:  penicillin (Rash)    MEDICATIONS:  STANDING MEDICATIONS  aspirin  chewable 81 milliGRAM(s) Oral daily  atorvastatin 80 milliGRAM(s) Oral at bedtime  clopidogrel Tablet 75 milliGRAM(s) Oral daily  docusate sodium 100 milliGRAM(s) Oral daily  metoprolol tartrate 12.5 milliGRAM(s) Oral two times a day  sodium chloride 0.9%. 750 milliLiter(s) IV Continuous <Continuous>  sodium chloride 0.9%. 1000 milliLiter(s) IV Continuous <Continuous>    PRN MEDICATIONS    VITALS:   T(F): 97.1  HR: 86  BP: 102/47  RR: 21  SpO2: 95%      PHYSICAL EXAM:  GEN: NAD, comfortable  LUNGS: CTAB, no w/r/r  HEART: RRR, no m/r/g  ABD: soft, NT/ND, +BS  EXT: no edema, PP b/l  NEURO: AAOx3    LABS:                        8.3    16.69 )-----------( 523      ( 29 Nov 2018 04:07 )             26.0     11-29    138  |  99  |  33<H>  ----------------------------<  106<H>  4.6   |  21  |  1.6<H>    Ca    7.9<L>      29 Nov 2018 04:07  Mg     1.7     11-29    TPro  5.1<L>  /  Alb  2.5<L>  /  TBili  0.4  /  DBili  x   /  AST  79<H>  /  ALT  30  /  AlkPhos  64  11-29    PT/INR - ( 28 Nov 2018 03:29 )   PT: 11.60 sec;   INR: 1.01 ratio       PTT - ( 29 Nov 2018 04:07 )  PTT:29.7 sec    CARDIAC MARKERS ( 28 Nov 2018 03:29 )  x     / 1.31 ng/mL / x     / x     / x        11-28-18 @ 07:01  -  11-29-18 @ 07:00  --------------------------------------------------------  IN: 888 mL / OUT: 275 mL / NET: 613 mL

## 2018-11-29 NOTE — PROGRESS NOTE ADULT - ASSESSMENT
77yo F c PMHX ?RA, recent R hip sx, mitral valve dx, here admitted with NSTEMI now s/p cath c PCI to prox circ and mid LAD, course complicated by orthostatic hypotension, also with leukocytosis likely reactive, and ESTEFANY, hypomagnesemia    c/w DAPT, statin, orthostatic precautions upon rising  add hold parameters to beta blocker for now hold if HR<50 or SBP<100  dvt ppx  incentive spirometry  routine post op R hip care per ortho team  replete mg and repeat  patient requests to f/u with Dr. patel regarding L ankle skin lesion as she was unable to f/u after her ortho sx and now nstemi  c/w tele  high risk but no recurrence of chest pain since stent  falls risk while orthostatic positive

## 2018-11-29 NOTE — PROGRESS NOTE ADULT - SUBJECTIVE AND OBJECTIVE BOX
AGUSTÍN TURCIOS  78y  Female      Patient is a 78y old  Female who presents with a chief complaint of chest pain (29 Nov 2018 11:50)      INTERVAL HPI/OVERNIGHT EVENTS: no recurrence of chest pain. was dizzy before when getting out of bed to chair with help      REVIEW OF SYSTEMS:  as above  All other review of systems negative    T(C): 36.4 (11-29-18 @ 12:21), Max: 36.6 (11-29-18 @ 08:00)  HR: 78 (11-29-18 @ 12:21) (76 - 110)  BP: 99/48 (11-29-18 @ 12:21) (77/39 - 149/64)  RR: 19 (11-29-18 @ 12:21) (15 - 28)  SpO2: 95% (11-29-18 @ 12:21) (94% - 100%)  Wt(kg): --Vital Signs Last 24 Hrs  T(C): 36.4 (29 Nov 2018 12:21), Max: 36.6 (29 Nov 2018 08:00)  T(F): 97.5 (29 Nov 2018 12:21), Max: 97.8 (29 Nov 2018 08:00)  HR: 78 (29 Nov 2018 12:21) (76 - 110)  BP: 99/48 (29 Nov 2018 12:21) (77/39 - 149/64)  BP(mean): 66 (29 Nov 2018 12:21) (50 - 97)  RR: 19 (29 Nov 2018 12:21) (15 - 28)  SpO2: 95% (29 Nov 2018 12:21) (94% - 100%)      11-28-18 @ 07:01  -  11-29-18 @ 07:00  --------------------------------------------------------  IN: 888 mL / OUT: 275 mL / NET: 613 mL        PHYSICAL EXAM:  GENERAL: NAD  PSYCH: no agitation, baseline mentation  NERVOUS SYSTEM:  Alert & Oriented X3, no new focal deficits  PULMONARY: Clear to percussion bilaterally; No rales, rhonchi, wheezing, or rubs  CARDIOVASCULAR: Regular rate and rhythm; No murmurs, rubs, or gallops  GI: Soft, Nontender, Nondistended; Bowel sounds present   R groin no hematoma  EXTREMITIES:  2+ Peripheral Pulses, No clubbing, cyanosis, or edema  MUSCULOSKELETAL :  R hip post sx changes, staples cdi, RLE immobilizing splint  SKIN L anterior shin skin lesion reportedly cancerous     Consultant(s) Notes Reviewed:  [x ] YES  [ ] NO    Discussed with Consultants/Other Providers [ x] YES     LABS                          8.3    16.69 )-----------( 523      ( 29 Nov 2018 04:07 )             26.0     11-29    138  |  99  |  33<H>  ----------------------------<  106<H>  4.6   |  21  |  1.6<H>    Ca    7.9<L>      29 Nov 2018 04:07  Mg     1.7     11-29    TPro  5.1<L>  /  Alb  2.5<L>  /  TBili  0.4  /  DBili  x   /  AST  79<H>  /  ALT  30  /  AlkPhos  64  11-29        PT/INR - ( 28 Nov 2018 03:29 )   PT: 11.60 sec;   INR: 1.01 ratio         PTT - ( 29 Nov 2018 11:02 )  PTT:29.5 sec  Lactate Trend    CARDIAC MARKERS ( 28 Nov 2018 03:29 )  x     / 1.31 ng/mL / x     / x     / x          CAPILLARY BLOOD GLUCOSE            RADIOLOGY & ADDITIONAL TESTS:    Imaging Personally Reviewed:  [ ] YES  [ ] NO    HEALTH ISSUES - PROBLEM Dx:

## 2018-11-29 NOTE — PROGRESS NOTE ADULT - SUBJECTIVE AND OBJECTIVE BOX
Cardiology Follow up    AGUSTÍN TURCIOS   78yFemale  PAST MEDICAL & SURGICAL HISTORY:  Arthritis  Mitral valve disorder  Other irritable bowel syndrome  H/O knee surgery  History of hip surgery  Hypertension  No significant past surgical history    Allergies    penicillin (Rash)    Intolerances      PT SEEN AND EXAMINED IN CCU	  Patient reports feeling dizzy this am while in chair while on bedpan, Pt was tachycardic and hypotensive per RN, possible vasovagal?  Denies CP, SOB, palpitations,  No events on telemetry overnight    Vital Signs Last 24 Hrs  T(C): 36.4 (29 Nov 2018 12:21), Max: 36.6 (29 Nov 2018 08:00)  T(F): 97.5 (29 Nov 2018 12:21), Max: 97.8 (29 Nov 2018 08:00)  HR: 78 (29 Nov 2018 12:21) (76 - 110)  BP: 99/48 (29 Nov 2018 12:21) (77/39 - 149/64)  BP(mean): 66 (29 Nov 2018 12:21) (50 - 97)  RR: 19 (29 Nov 2018 12:21) (15 - 28)  SpO2: 95% (29 Nov 2018 12:21) (94% - 100%)Allergies    REVIEW OF SYSTEMS:    CONSTITUTIONAL: No weakness, fevers or chills  EYES/ENT: No visual changes;  No vertigo or throat pain   NECK: No pain or stiffness  RESPIRATORY: No cough, wheezing, hemoptysis; No shortness of breath  CARDIOVASCULAR: No chest pain or palpitations  GASTROINTESTINAL: No abdominal or epigastric pain. No nausea, vomiting, or hematemesis; No diarrhea or constipation. No melena or hematochezia.  GENITOURINARY: No dysuria, frequency or hematuria  NEUROLOGICAL: No numbness or weakness  SKIN: No itching, rashes        NAD, appears well  S1S2, no murmurs, no JVD  CTA B/L, no wheeze, no rales  SNT +BS  Ext:    Right  Groin:  NO hematoma or bleeding     NO bruit, C/D/I     Pulses:  +Rad/ +PTs /+DPs/ same as baseline  A&Ox 3    EKG               Ventricular Rate 90 BPM    Atrial Rate 90 BPM    P-R Interval 122 ms    QRS Duration 82 ms    Q-T Interval 396 ms    QTC Calculation(Bezet) 484 ms    P Axis 81 degrees    R Axis 31 degrees    T Axis -59 degrees    Diagnosis Line Normal sinus rhythm  Marked ST abnormality, possible inferolateral subendocardial injury  Prolonged QT  Abnormal ECG    Confirmed by Matthew Carroll ([id number]) on 11/29/2018 12:09:13 PM                                                                                                           LABS                        8.3    16.69 )-----------( 523      ( 29 Nov 2018 04:07 )             26.0     11-29    138  |  99  |  33<H>  ----------------------------<  106<H>  4.6   |  21  |  1.6<H>    Ca    7.9<L>      29 Nov 2018 04:07  Mg     1.7     11-29    TPro  5.1<L>  /  Alb  2.5<L>  /  TBili  0.4  /  DBili  x   /  AST  79<H>  /  ALT  30  /  AlkPhos  64  11-29    CARDIAC MARKERS ( 28 Nov 2018 03:29 )  x     / 1.31 ng/mL / x     / x     / x          Magnesium, Serum: 1.7 mg/dL <L> [1.8 - 2.4] (11-29-18 @ 04:07)  LIVER FUNCTIONS - ( 29 Nov 2018 04:07 )  Alb: 2.5 g/dL / Pro: 5.1 g/dL / ALK PHOS: 64 U/L / ALT: 30 U/L / AST: 79 U/L / GGT: x             A/P:  I discussed the case with Interventional Cardiologist Dr. Rodrigues & recommends the following:    S/P PCI pLCX and mLAD cobra stent x 2  	         Continue DAPT,(asa 81mg, plavix 75 mg daily) Statin Therapy                     hold BB due to hypotension and dizziness this am per Dr. Rodrigues, IVF hydration                   monitor H/H, monitor Cr                   Pt given instructions on importance of taking antiplatelet medication or risk acute stent thrombosis/death                   Post cath instructions, access site care and activity restrictions reviewed with patient                     Discussed with patient to return to hospital if experience chest pain, shortness breath, dizziness and site bleeding                   Aggressive risk factor modification, diet counseling, smoking cessation discussed with patient                                          Follow up with Dr. Rodrigues regarding disposition

## 2018-11-29 NOTE — PROGRESS NOTE ADULT - ASSESSMENT
78yFemale with PMH below presented to the hospital for typical chest pain. recent h/o surgery for # rt femur.      NSTEMI  - s/p CATH: stent in LAD and Lcx  - cw DAPT  - cw lipitor  - f/u 2d echo  - hold antihypertensive medications because of low BP currently  - IV NS at 75 ml/hr for now    Diet: dash/tlc  dvt ppx: heparin     Activity: increase as tolerated  Code: full code

## 2018-11-30 LAB
ALBUMIN SERPL ELPH-MCNC: 2.1 G/DL — LOW (ref 3.5–5.2)
ALLERGY+IMMUNOLOGY DIAG STUDY NOTE: SIGNIFICANT CHANGE UP
ALP SERPL-CCNC: 67 U/L — SIGNIFICANT CHANGE UP (ref 30–115)
ALT FLD-CCNC: 27 U/L — SIGNIFICANT CHANGE UP (ref 0–41)
ANION GAP SERPL CALC-SCNC: 16 MMOL/L — HIGH (ref 7–14)
AST SERPL-CCNC: 43 U/L — HIGH (ref 0–41)
BASOPHILS # BLD AUTO: 0.03 K/UL — SIGNIFICANT CHANGE UP (ref 0–0.2)
BASOPHILS NFR BLD AUTO: 0.2 % — SIGNIFICANT CHANGE UP (ref 0–1)
BILIRUB SERPL-MCNC: 0.4 MG/DL — SIGNIFICANT CHANGE UP (ref 0.2–1.2)
BUN SERPL-MCNC: 50 MG/DL — HIGH (ref 10–20)
CALCIUM SERPL-MCNC: 7.5 MG/DL — LOW (ref 8.5–10.1)
CHLORIDE SERPL-SCNC: 102 MMOL/L — SIGNIFICANT CHANGE UP (ref 98–110)
CO2 SERPL-SCNC: 21 MMOL/L — SIGNIFICANT CHANGE UP (ref 17–32)
CREAT SERPL-MCNC: 2.4 MG/DL — HIGH (ref 0.7–1.5)
EOSINOPHIL # BLD AUTO: 0.54 K/UL — SIGNIFICANT CHANGE UP (ref 0–0.7)
EOSINOPHIL NFR BLD AUTO: 2.8 % — SIGNIFICANT CHANGE UP (ref 0–8)
GLUCOSE SERPL-MCNC: 98 MG/DL — SIGNIFICANT CHANGE UP (ref 70–99)
HCT VFR BLD CALC: 21.4 % — LOW (ref 37–47)
HCT VFR BLD CALC: 23 % — LOW (ref 37–47)
HGB BLD-MCNC: 6.8 G/DL — CRITICAL LOW (ref 12–16)
HGB BLD-MCNC: 7.2 G/DL — CRITICAL LOW (ref 12–16)
IMM GRANULOCYTES NFR BLD AUTO: 2.2 % — HIGH (ref 0.1–0.3)
LYMPHOCYTES # BLD AUTO: 1.39 K/UL — SIGNIFICANT CHANGE UP (ref 1.2–3.4)
LYMPHOCYTES # BLD AUTO: 7.3 % — LOW (ref 20.5–51.1)
MAGNESIUM SERPL-MCNC: 2.5 MG/DL — HIGH (ref 1.8–2.4)
MCHC RBC-ENTMCNC: 28.1 PG — SIGNIFICANT CHANGE UP (ref 27–31)
MCHC RBC-ENTMCNC: 28.3 PG — SIGNIFICANT CHANGE UP (ref 27–31)
MCHC RBC-ENTMCNC: 31.3 G/DL — LOW (ref 32–37)
MCHC RBC-ENTMCNC: 31.8 G/DL — LOW (ref 32–37)
MCV RBC AUTO: 89.2 FL — SIGNIFICANT CHANGE UP (ref 81–99)
MCV RBC AUTO: 89.8 FL — SIGNIFICANT CHANGE UP (ref 81–99)
MONOCYTES # BLD AUTO: 1.34 K/UL — HIGH (ref 0.1–0.6)
MONOCYTES NFR BLD AUTO: 7 % — SIGNIFICANT CHANGE UP (ref 1.7–9.3)
NEUTROPHILS # BLD AUTO: 15.35 K/UL — HIGH (ref 1.4–6.5)
NEUTROPHILS NFR BLD AUTO: 80.5 % — HIGH (ref 42.2–75.2)
NRBC # BLD: 0 /100 WBCS — SIGNIFICANT CHANGE UP (ref 0–0)
NRBC # BLD: 0 /100 WBCS — SIGNIFICANT CHANGE UP (ref 0–0)
PHOSPHATE SERPL-MCNC: 3.3 MG/DL — SIGNIFICANT CHANGE UP (ref 2.1–4.9)
PLATELET # BLD AUTO: 441 K/UL — HIGH (ref 130–400)
PLATELET # BLD AUTO: 485 K/UL — HIGH (ref 130–400)
POTASSIUM SERPL-MCNC: 4.1 MMOL/L — SIGNIFICANT CHANGE UP (ref 3.5–5)
POTASSIUM SERPL-SCNC: 4.1 MMOL/L — SIGNIFICANT CHANGE UP (ref 3.5–5)
PROT SERPL-MCNC: 4.8 G/DL — LOW (ref 6–8)
RBC # BLD: 2.4 M/UL — LOW (ref 4.2–5.4)
RBC # BLD: 2.56 M/UL — LOW (ref 4.2–5.4)
RBC # FLD: 16.1 % — HIGH (ref 11.5–14.5)
RBC # FLD: 16.5 % — HIGH (ref 11.5–14.5)
SODIUM SERPL-SCNC: 139 MMOL/L — SIGNIFICANT CHANGE UP (ref 135–146)
TYPE + AB SCN PNL BLD: SIGNIFICANT CHANGE UP
WBC # BLD: 16.78 K/UL — HIGH (ref 4.8–10.8)
WBC # BLD: 19.07 K/UL — HIGH (ref 4.8–10.8)
WBC # FLD AUTO: 16.78 K/UL — HIGH (ref 4.8–10.8)
WBC # FLD AUTO: 19.07 K/UL — HIGH (ref 4.8–10.8)

## 2018-11-30 PROCEDURE — 93971 EXTREMITY STUDY: CPT | Mod: 26

## 2018-11-30 RX ORDER — DIPHENHYDRAMINE HCL 50 MG
25 CAPSULE ORAL ONCE
Qty: 0 | Refills: 0 | Status: COMPLETED | OUTPATIENT
Start: 2018-11-30 | End: 2018-11-30

## 2018-11-30 RX ADMIN — HEPARIN SODIUM 5000 UNIT(S): 5000 INJECTION INTRAVENOUS; SUBCUTANEOUS at 21:14

## 2018-11-30 RX ADMIN — Medication 600 MILLIGRAM(S): at 01:16

## 2018-11-30 RX ADMIN — Medication 81 MILLIGRAM(S): at 11:38

## 2018-11-30 RX ADMIN — CLOPIDOGREL BISULFATE 75 MILLIGRAM(S): 75 TABLET, FILM COATED ORAL at 11:38

## 2018-11-30 RX ADMIN — HEPARIN SODIUM 5000 UNIT(S): 5000 INJECTION INTRAVENOUS; SUBCUTANEOUS at 05:26

## 2018-11-30 RX ADMIN — Medication 10 MILLIGRAM(S): at 06:43

## 2018-11-30 RX ADMIN — Medication 12.5 MILLIGRAM(S): at 17:42

## 2018-11-30 RX ADMIN — CHLORHEXIDINE GLUCONATE 1 APPLICATION(S): 213 SOLUTION TOPICAL at 05:25

## 2018-11-30 RX ADMIN — ATORVASTATIN CALCIUM 80 MILLIGRAM(S): 80 TABLET, FILM COATED ORAL at 21:13

## 2018-11-30 RX ADMIN — Medication 600 MILLIGRAM(S): at 17:41

## 2018-11-30 RX ADMIN — Medication 25 MILLIGRAM(S): at 21:14

## 2018-11-30 RX ADMIN — Medication 100 MILLIGRAM(S): at 11:38

## 2018-11-30 RX ADMIN — HEPARIN SODIUM 5000 UNIT(S): 5000 INJECTION INTRAVENOUS; SUBCUTANEOUS at 13:24

## 2018-11-30 NOTE — PROGRESS NOTE ADULT - ASSESSMENT
78yFemale with PMH below presented to the hospital for typical chest pain. recent h/o surgery for # rt femur.      NSTEMI  - s/p CATH: stent in LAD and Lcx  - cw DAPT  - cw lipitor  - f/u 2d echo  - hold antihypertensive medications because of low BP currently    Anemia: f/u CBC, hold of fluids, OOBtc    Diet: dash/tlc  dvt ppx: heparin     Activity: increase as tolerated  Code: full code 78y Female with PMH below presented to the hospital for typical chest pain. recent h/o surgery for # rt femur on 11/22/18.      NSTEMI  - s/p CATH: stent in LAD and Lcx  - cw DAPT  - cw lipitor  - f/u 2d echo  - hold antihypertensive medications because of low BP currently  - CCU monitoring  - cardio f/u  - guarded prognosis    Anemia: acute over chronic (baseline around 8 after surgery this month) - could be partially due to hemodilution (she received IVF yesterday for hypotension)   - f/u CBC, hold off on fluids, OOBtc when BP stable  - hold transfusion for now per cardio  - monitor for bleeding    ESTEFANY - could be due to hypotension   - keep SBP > 100 and monitor BMP and urine output    Right UE edema - check venous duplex, elevate arm     Leukocytosis - likely reactive - pt afebrile - continue to monitor    Diet: dash/tlc  dvt ppx: heparin     Activity: increase as tolerated  Code: full code

## 2018-11-30 NOTE — PROGRESS NOTE ADULT - ATTENDING COMMENTS
Pt seen and examined independently and case discussed with resident today.   She feels better than yesterday - no dizziness or chest pain.   SBP now 96. Hgb 6.8  No transfusion for now per cardio.  Serial H/H     < from: Transthoracic Echocardiogram (11.30.18 @ 07:10) >    Summary:   1. Left ventricular ejection fraction, by visual estimation, is 50 to   55%.   2. Normal global left ventricular systolic function.   3. Moderate tricuspid regurgitation.   4. PSAP at least 55.   5. Trace pulmonic valve regurgitation.    < end of copied text >    Continue CCU monitoring.    I reviewed the resident's note and I agree with the physical exam, assessment and plan with additions and corrections as above.

## 2018-11-30 NOTE — PROGRESS NOTE ADULT - SUBJECTIVE AND OBJECTIVE BOX
right leg examined  all wounds clean and dry   leg is not swollen  H and H low not related to leg    if repeat is low would consider transfusion

## 2018-11-30 NOTE — PROGRESS NOTE ADULT - SUBJECTIVE AND OBJECTIVE BOX
SUBJ: Doing well abit dizzy was hydrated so hgb is down.       MEDICATIONS  (STANDING):  aspirin  chewable 81 milliGRAM(s) Oral daily  atorvastatin 80 milliGRAM(s) Oral at bedtime  chlorhexidine 4% Liquid 1 Application(s) Topical <User Schedule>  clopidogrel Tablet 75 milliGRAM(s) Oral daily  docusate sodium 100 milliGRAM(s) Oral daily  guaiFENesin  milliGRAM(s) Oral every 12 hours  heparin  Injectable 5000 Unit(s) SubCutaneous every 8 hours  metoprolol tartrate 12.5 milliGRAM(s) Oral two times a day  sodium chloride 0.9%. 750 milliLiter(s) (75 mL/Hr) IV Continuous <Continuous>  sodium chloride 0.9%. 1000 milliLiter(s) (75 mL/Hr) IV Continuous <Continuous>    MEDICATIONS  (PRN):  bisacodyl Suppository 10 milliGRAM(s) Rectal daily PRN Constipation            Vital Signs Last 24 Hrs  T(C): 36.8 (30 Nov 2018 07:47), Max: 36.8 (30 Nov 2018 07:47)  T(F): 98.3 (30 Nov 2018 07:47), Max: 98.3 (30 Nov 2018 07:47)  HR: 82 (30 Nov 2018 07:47) (78 - 96)  BP: 121/54 (30 Nov 2018 07:47) (93/51 - 121/54)  BP(mean): 70 (30 Nov 2018 07:47) (53 - 71)  RR: 21 (30 Nov 2018 07:47) (16 - 28)  SpO2: 99% (30 Nov 2018 07:47) (95% - 99%)     REVIEW OF SYSTEMS:  CONSTITUTIONAL: No fever, weight loss, or fatigue  CARDIOLOGY: PAtient denies chest pain, shortness of breath or syncopal episodes.   RESPIRATORY: denies shortness of breath, wheezeing.   NEUROLOGICAL: NO weakness, no focal deficits to report.  ENDOCRINOLOGICAL: no recent change in diabetic medications.   GI: no BRBPR, no N,V,diarrhea.    PSYCHIATRY: normal mood and affect  HEENT: no nasal discharge, no ecchymosis  SKIN: no ecchymosis, no breakdown  MUSCULOSKELETAL: Full range of motion x4.        PHYSICAL EXAM:  · CONSTITUTIONAL:	Well-developed, well nourished    BMI-  ·RESPIRATORY:   airway patent; breath sounds equal; good air movement; respirations non-labored; clear to auscultation bilaterally; no chest wall tenderness; no intercostal retractions; no rales,rhonchi or wheeze  · CARDIOVASCULAR	regular rate and rhythm  no rub  no murmur  normal PMI  · EXTREMITIES: No cyanosis, clubbing or edema  · VASCULAR: 	Equal and normal pulses (carotid, femoral, dorsalis pedis)  	  TELEMETRY:    ECG:    TTE:    LABS:                        6.8    16.78 )-----------( 441      ( 30 Nov 2018 04:21 )             21.4     11-30    139  |  102  |  50<H>  ----------------------------<  98  4.1   |  21  |  2.4<H>    Ca    7.5<L>      30 Nov 2018 04:21  Phos  3.3     11-30  Mg     2.5     11-30    TPro  4.8<L>  /  Alb  2.1<L>  /  TBili  0.4  /  DBili  x   /  AST  43<H>  /  ALT  27  /  AlkPhos  67  11-30        PTT - ( 29 Nov 2018 11:02 )  PTT:29.5 sec    I&O's Summary    29 Nov 2018 07:01 - 30 Nov 2018 07:00  --------------------------------------------------------  IN: 1860 mL / OUT: 575 mL / NET: 1285 mL    30 Nov 2018 07:01  -  30 Nov 2018 10:54  --------------------------------------------------------  IN: 75 mL / OUT: 0 mL / NET: 75 mL      BNP  RADIOLOGY & ADDITIONAL STUDIES:    IMPRESSION AND PLAN:    DC fluids for now  OOB TC.  try to ambulate hold off on any transfusion for now repeat H and H

## 2018-11-30 NOTE — PROGRESS NOTE ADULT - SUBJECTIVE AND OBJECTIVE BOX
SUBJECTIVE:    Patient is a 78y old Female who presents with a chief complaint of chest pain (30 Nov 2018 10:54)    Currently admitted to medicine with the primary diagnosis of STEMI (ST ELEVATION MYOCARDIAL INFARCTION)    Today is hospital day 2d.This morning she  is resting comfortably in bed and reports no new issues or overnight events.     HPI:  78yFemale with PMH below presented to the hospital for chest pain that radiated up her jaw and into her left arm while she was performing PT. She took two aspirin earlier and her pain resolved. She states that she thought it was from manipulation during her PT but got better with aspirin. She denies similar pain previously. She currently endorses pain only when she takes a deep breath and feels a soreness. Denies shortness of breath.   found to have  ST depressions in V2 and V3 with borderline DOLORES in inferior leads.  	  ROS negative except as above (28 Nov 2018 13:33)      PAST MEDICAL & SURGICAL HISTORY  Arthritis  Mitral valve disorder  Other irritable bowel syndrome  H/O knee surgery  History of hip surgery  Hypertension  No significant past surgical history    ALLERGIES:  penicillin (Rash)    MEDICATIONS:  STANDING MEDICATIONS  aspirin  chewable 81 milliGRAM(s) Oral daily  atorvastatin 80 milliGRAM(s) Oral at bedtime  chlorhexidine 4% Liquid 1 Application(s) Topical <User Schedule>  clopidogrel Tablet 75 milliGRAM(s) Oral daily  docusate sodium 100 milliGRAM(s) Oral daily  guaiFENesin  milliGRAM(s) Oral every 12 hours  heparin  Injectable 5000 Unit(s) SubCutaneous every 8 hours  metoprolol tartrate 12.5 milliGRAM(s) Oral two times a day  sodium chloride 0.9%. 750 milliLiter(s) IV Continuous <Continuous>  sodium chloride 0.9%. 1000 milliLiter(s) IV Continuous <Continuous>    PRN MEDICATIONS  bisacodyl Suppository 10 milliGRAM(s) Rectal daily PRN    VITALS:   T(F): 98.3  HR: 82  BP: 121/54  RR: 21  SpO2: 99%      PHYSICAL EXAM:  GEN: NAD, comfortable  LUNGS: CTAB, no w/r/r  HEART: RRR, no m/r/g  ABD: soft, NT/ND, +BS  EXT: RUE swelling.  NEURO: AAOx3    LABS:                        6.8    16.78 )-----------( 441      ( 30 Nov 2018 04:21 )             21.4     11-30    139  |  102  |  50<H>  ----------------------------<  98  4.1   |  21  |  2.4<H>    Ca    7.5<L>      30 Nov 2018 04:21  Phos  3.3     11-30  Mg     2.5     11-30    TPro  4.8<L>  /  Alb  2.1<L>  /  TBili  0.4  /  DBili  x   /  AST  43<H>  /  ALT  27  /  AlkPhos  67  11-30    PTT - ( 29 Nov 2018 11:02 )  PTT:29.5 sec    11-29-18 @ 07:01  -  11-30-18 @ 07:00  --------------------------------------------------------  IN: 1860 mL / OUT: 575 mL / NET: 1285 mL    11-30-18 @ 07:01  -  11-30-18 @ 11:00  --------------------------------------------------------  IN: 75 mL / OUT: 0 mL / NET: 75 mL SUBJECTIVE:    Patient is a 78y old Female who presents with a chief complaint of chest pain (30 Nov 2018 10:54)    Currently admitted to medicine with the primary diagnosis of STEMI (ST ELEVATION MYOCARDIAL INFARCTION)    Today is hospital day 2d.This morning she  is resting comfortably in bed and reports no new issues or overnight events.   BP improved from yesterday. No bleeding or chest pain. Had small BM after suppository.    HPI:  78yFemale with PMH below presented to the hospital for chest pain that radiated up her jaw and into her left arm while she was performing PT. She took two aspirin earlier and her pain resolved. She states that she thought it was from manipulation during her PT but got better with aspirin. She denies similar pain previously. She currently endorses pain only when she takes a deep breath and feels a soreness. Denies shortness of breath.   found to have  ST depressions in V2 and V3 with borderline DOLORES in inferior leads.  	  ROS negative except as above (28 Nov 2018 13:33)      PAST MEDICAL & SURGICAL HISTORY  Arthritis  Mitral valve disorder  Other irritable bowel syndrome  H/O knee surgery  History of hip surgery  Hypertension  11/22/18 - s/p Right ORIF for periprosthetic fracture of leg    ALLERGIES:  penicillin (Rash)    MEDICATIONS:  STANDING MEDICATIONS  aspirin  chewable 81 milliGRAM(s) Oral daily  atorvastatin 80 milliGRAM(s) Oral at bedtime  chlorhexidine 4% Liquid 1 Application(s) Topical <User Schedule>  clopidogrel Tablet 75 milliGRAM(s) Oral daily  docusate sodium 100 milliGRAM(s) Oral daily  guaiFENesin  milliGRAM(s) Oral every 12 hours  heparin  Injectable 5000 Unit(s) SubCutaneous every 8 hours  metoprolol tartrate 12.5 milliGRAM(s) Oral two times a day  sodium chloride 0.9%. 750 milliLiter(s) IV Continuous <Continuous>  sodium chloride 0.9%. 1000 milliLiter(s) IV Continuous <Continuous>    PRN MEDICATIONS  bisacodyl Suppository 10 milliGRAM(s) Rectal daily PRN    VITALS:   T(F): 98.3  HR: 82  BP: 121/54  RR: 21  SpO2: 99% on RA      PHYSICAL EXAM:  GEN: NAD, comfortable  LUNGS: CTAB, no w/r/r  HEART: RRR, no m/r/g  ABD: soft, NT/ND, +BS  EXT: RUE swelling (had IV earlier in the antecubital fossa)  radial pulse 2+ and good cap refill  Right LE with immobilizer in place  b/l LE edema  NEURO: AAOx3    LABS:                        6.8    16.78 )-----------( 441      ( 30 Nov 2018 04:21 )             21.4     11-30    139  |  102  |  50<H>  ----------------------------<  98  4.1   |  21  |  2.4<H>    Ca    7.5<L>      30 Nov 2018 04:21  Phos  3.3     11-30  Mg     2.5     11-30    TPro  4.8<L>  /  Alb  2.1<L>  /  TBili  0.4  /  DBili  x   /  AST  43<H>  /  ALT  27  /  AlkPhos  67  11-30    PTT - ( 29 Nov 2018 11:02 )  PTT:29.5 sec    11-29-18 @ 07:01 - 11-30-18 @ 07:00  --------------------------------------------------------  IN: 1860 mL / OUT: 575 mL / NET: 1285 mL    11-30-18 @ 07:01 - 11-30-18 @ 11:00  --------------------------------------------------------  IN: 75 mL / OUT: 0 mL / NET: 75 mL

## 2018-12-01 LAB
ALBUMIN SERPL ELPH-MCNC: 2.1 G/DL — LOW (ref 3.5–5.2)
ALP SERPL-CCNC: 77 U/L — SIGNIFICANT CHANGE UP (ref 30–115)
ALT FLD-CCNC: 35 U/L — SIGNIFICANT CHANGE UP (ref 0–41)
ANION GAP SERPL CALC-SCNC: 14 MMOL/L — SIGNIFICANT CHANGE UP (ref 7–14)
AST SERPL-CCNC: 47 U/L — HIGH (ref 0–41)
BASOPHILS # BLD AUTO: 0.03 K/UL — SIGNIFICANT CHANGE UP (ref 0–0.2)
BASOPHILS NFR BLD AUTO: 0.2 % — SIGNIFICANT CHANGE UP (ref 0–1)
BILIRUB SERPL-MCNC: 0.4 MG/DL — SIGNIFICANT CHANGE UP (ref 0.2–1.2)
BUN SERPL-MCNC: 54 MG/DL — HIGH (ref 10–20)
CALCIUM SERPL-MCNC: 7.5 MG/DL — LOW (ref 8.5–10.1)
CHLORIDE SERPL-SCNC: 104 MMOL/L — SIGNIFICANT CHANGE UP (ref 98–110)
CO2 SERPL-SCNC: 20 MMOL/L — SIGNIFICANT CHANGE UP (ref 17–32)
CREAT SERPL-MCNC: 2.3 MG/DL — HIGH (ref 0.7–1.5)
EOSINOPHIL # BLD AUTO: 0.39 K/UL — SIGNIFICANT CHANGE UP (ref 0–0.7)
EOSINOPHIL NFR BLD AUTO: 2.4 % — SIGNIFICANT CHANGE UP (ref 0–8)
GLUCOSE SERPL-MCNC: 98 MG/DL — SIGNIFICANT CHANGE UP (ref 70–99)
HCT VFR BLD CALC: 21.5 % — LOW (ref 37–47)
HCT VFR BLD CALC: 26 % — LOW (ref 37–47)
HGB BLD-MCNC: 6.8 G/DL — CRITICAL LOW (ref 12–16)
HGB BLD-MCNC: 8.2 G/DL — LOW (ref 12–16)
IMM GRANULOCYTES NFR BLD AUTO: 0.9 % — HIGH (ref 0.1–0.3)
LYMPHOCYTES # BLD AUTO: 1.06 K/UL — LOW (ref 1.2–3.4)
LYMPHOCYTES # BLD AUTO: 6.4 % — LOW (ref 20.5–51.1)
MCHC RBC-ENTMCNC: 26.8 PG — LOW (ref 27–31)
MCHC RBC-ENTMCNC: 28.6 PG — SIGNIFICANT CHANGE UP (ref 27–31)
MCHC RBC-ENTMCNC: 31.5 G/DL — LOW (ref 32–37)
MCHC RBC-ENTMCNC: 31.6 G/DL — LOW (ref 32–37)
MCV RBC AUTO: 85 FL — SIGNIFICANT CHANGE UP (ref 81–99)
MCV RBC AUTO: 90.3 FL — SIGNIFICANT CHANGE UP (ref 81–99)
MONOCYTES # BLD AUTO: 0.97 K/UL — HIGH (ref 0.1–0.6)
MONOCYTES NFR BLD AUTO: 5.9 % — SIGNIFICANT CHANGE UP (ref 1.7–9.3)
NEUTROPHILS # BLD AUTO: 13.84 K/UL — HIGH (ref 1.4–6.5)
NEUTROPHILS NFR BLD AUTO: 84.2 % — HIGH (ref 42.2–75.2)
NRBC # BLD: 0 /100 WBCS — SIGNIFICANT CHANGE UP (ref 0–0)
NRBC # BLD: 0 /100 WBCS — SIGNIFICANT CHANGE UP (ref 0–0)
PLATELET # BLD AUTO: 475 K/UL — HIGH (ref 130–400)
PLATELET # BLD AUTO: 483 K/UL — HIGH (ref 130–400)
POTASSIUM SERPL-MCNC: 4.7 MMOL/L — SIGNIFICANT CHANGE UP (ref 3.5–5)
POTASSIUM SERPL-SCNC: 4.7 MMOL/L — SIGNIFICANT CHANGE UP (ref 3.5–5)
PROT SERPL-MCNC: 4.8 G/DL — LOW (ref 6–8)
RBC # BLD: 2.38 M/UL — LOW (ref 4.2–5.4)
RBC # BLD: 3.06 M/UL — LOW (ref 4.2–5.4)
RBC # FLD: 16.4 % — HIGH (ref 11.5–14.5)
RBC # FLD: 20.1 % — HIGH (ref 11.5–14.5)
SODIUM SERPL-SCNC: 138 MMOL/L — SIGNIFICANT CHANGE UP (ref 135–146)
WBC # BLD: 15.3 K/UL — HIGH (ref 4.8–10.8)
WBC # BLD: 16.44 K/UL — HIGH (ref 4.8–10.8)
WBC # FLD AUTO: 15.3 K/UL — HIGH (ref 4.8–10.8)
WBC # FLD AUTO: 16.44 K/UL — HIGH (ref 4.8–10.8)

## 2018-12-01 RX ORDER — ACETAMINOPHEN 500 MG
650 TABLET ORAL EVERY 6 HOURS
Qty: 0 | Refills: 0 | Status: DISCONTINUED | OUTPATIENT
Start: 2018-12-01 | End: 2018-12-05

## 2018-12-01 RX ORDER — DIPHENHYDRAMINE HCL 50 MG
50 CAPSULE ORAL ONCE
Qty: 0 | Refills: 0 | Status: COMPLETED | OUTPATIENT
Start: 2018-12-01 | End: 2018-12-01

## 2018-12-01 RX ORDER — ACETAMINOPHEN 500 MG
650 TABLET ORAL ONCE
Qty: 0 | Refills: 0 | Status: COMPLETED | OUTPATIENT
Start: 2018-12-01 | End: 2018-12-01

## 2018-12-01 RX ADMIN — HEPARIN SODIUM 5000 UNIT(S): 5000 INJECTION INTRAVENOUS; SUBCUTANEOUS at 05:13

## 2018-12-01 RX ADMIN — Medication 12.5 MILLIGRAM(S): at 18:20

## 2018-12-01 RX ADMIN — Medication 600 MILLIGRAM(S): at 05:12

## 2018-12-01 RX ADMIN — HEPARIN SODIUM 5000 UNIT(S): 5000 INJECTION INTRAVENOUS; SUBCUTANEOUS at 15:15

## 2018-12-01 RX ADMIN — HEPARIN SODIUM 5000 UNIT(S): 5000 INJECTION INTRAVENOUS; SUBCUTANEOUS at 22:37

## 2018-12-01 RX ADMIN — Medication 650 MILLIGRAM(S): at 21:45

## 2018-12-01 RX ADMIN — Medication 81 MILLIGRAM(S): at 11:58

## 2018-12-01 RX ADMIN — Medication 100 MILLIGRAM(S): at 11:58

## 2018-12-01 RX ADMIN — Medication 50 MILLIGRAM(S): at 23:30

## 2018-12-01 RX ADMIN — Medication 650 MILLIGRAM(S): at 13:30

## 2018-12-01 RX ADMIN — Medication 600 MILLIGRAM(S): at 18:20

## 2018-12-01 RX ADMIN — CHLORHEXIDINE GLUCONATE 1 APPLICATION(S): 213 SOLUTION TOPICAL at 05:14

## 2018-12-01 RX ADMIN — CLOPIDOGREL BISULFATE 75 MILLIGRAM(S): 75 TABLET, FILM COATED ORAL at 11:58

## 2018-12-01 RX ADMIN — Medication 650 MILLIGRAM(S): at 14:30

## 2018-12-01 RX ADMIN — ATORVASTATIN CALCIUM 80 MILLIGRAM(S): 80 TABLET, FILM COATED ORAL at 22:37

## 2018-12-01 NOTE — PROGRESS NOTE ADULT - SUBJECTIVE AND OBJECTIVE BOX
SUBJECTIVE:    Patient is a 78y old Female who presents with a chief complaint of chest pain (30 Nov 2018 18:55)    Currently admitted to medicine with the primary diagnosis of STEMI (ST elevation myocardial infarction)     Today is hospital day 3d.   OVERNIGHT EVENTS no events overnight     PAST MEDICAL & SURGICAL HISTORY  Arthritis  Mitral valve disorder  Other irritable bowel syndrome  H/O knee surgery  History of hip surgery  Hypertension  No significant past surgical history          ALLERGIES:  penicillin (Rash)    MEDICATIONS:  STANDING MEDICATIONS  aspirin  chewable 81 milliGRAM(s) Oral daily  atorvastatin 80 milliGRAM(s) Oral at bedtime  chlorhexidine 4% Liquid 1 Application(s) Topical <User Schedule>  clopidogrel Tablet 75 milliGRAM(s) Oral daily  docusate sodium 100 milliGRAM(s) Oral daily  guaiFENesin  milliGRAM(s) Oral every 12 hours  heparin  Injectable 5000 Unit(s) SubCutaneous every 8 hours  metoprolol tartrate 12.5 milliGRAM(s) Oral two times a day    PRN MEDICATIONS  bisacodyl Suppository 10 milliGRAM(s) Rectal daily PRN    VITALS:   T(F): 99.3  HR: 82  BP: 103/46  RR: 22  SpO2: 97%          LABS:                        7.2    19.07 )-----------( 485      ( 30 Nov 2018 12:11 )             23.0     11-30    139  |  102  |  50<H>  ----------------------------<  98  4.1   |  21  |  2.4<H>    Ca    7.5<L>      30 Nov 2018 04:21  Phos  3.3     11-30  Mg     2.5     11-30    TPro  4.8<L>  /  Alb  2.1<L>  /  TBili  0.4  /  DBili  x   /  AST  43<H>  /  ALT  27  /  AlkPhos  67  11-30    PTT - ( 29 Nov 2018 11:02 )  PTT:29.5 sec              RADIOLOGY:    PHYSICAL EXAM:  GEN: NAD  LUNGS: CTAB  HEART: RRR, s1s2 present  ABD: soft, nontender nondistended +BS  EXT: RUE swelling, b/l LE edema  NEURO: aaox3 SUBJECTIVE:    Patient is a 78y old Female who presents with a chief complaint of chest pain (30 Nov 2018 18:55)    Currently admitted to medicine with the primary diagnosis of STEMI (ST elevation myocardial infarction)     Today is hospital day 3d.   OVERNIGHT EVENTS no events overnight   no cp, sob, abd pain, fever  no cp, palpitations, addison, orthopnea    PAST MEDICAL & SURGICAL HISTORY  Arthritis  Mitral valve disorder  Other irritable bowel syndrome  H/O knee surgery  History of hip surgery  Hypertension  No significant past surgical history          ALLERGIES:  penicillin (Rash)    MEDICATIONS:  STANDING MEDICATIONS  aspirin  chewable 81 milliGRAM(s) Oral daily  atorvastatin 80 milliGRAM(s) Oral at bedtime  chlorhexidine 4% Liquid 1 Application(s) Topical <User Schedule>  clopidogrel Tablet 75 milliGRAM(s) Oral daily  docusate sodium 100 milliGRAM(s) Oral daily  guaiFENesin  milliGRAM(s) Oral every 12 hours  heparin  Injectable 5000 Unit(s) SubCutaneous every 8 hours  metoprolol tartrate 12.5 milliGRAM(s) Oral two times a day    PRN MEDICATIONS  bisacodyl Suppository 10 milliGRAM(s) Rectal daily PRN    VITALS:   T(F): 99.3  HR: 82  BP: 103/46  RR: 22  SpO2: 97%          LABS:                        7.2    19.07 )-----------( 485      ( 30 Nov 2018 12:11 )             23.0     11-30    139  |  102  |  50<H>  ----------------------------<  98  4.1   |  21  |  2.4<H>    Ca    7.5<L>      30 Nov 2018 04:21  Phos  3.3     11-30  Mg     2.5     11-30    TPro  4.8<L>  /  Alb  2.1<L>  /  TBili  0.4  /  DBili  x   /  AST  43<H>  /  ALT  27  /  AlkPhos  67  11-30    PTT - ( 29 Nov 2018 11:02 )  PTT:29.5 sec              RADIOLOGY:    PHYSICAL EXAM:  GEN: NAD  LUNGS: CTAB  HEART: RRR, s1s2 present  ABD: soft, nontender nondistended +BS  EXT: RUE swelling, b/l LE edema  NEURO: aaox3

## 2018-12-01 NOTE — PROGRESS NOTE ADULT - ASSESSMENT
78y Female with PMH below presented to the hospital for typical chest pain. recent h/o surgery for # rt femur on 11/22/18.    # NSTEMI  - s/p CATH: stent in LAD and Lcx  - cw DAPT  - cw lipitor  - f/u 2d echo  - hold antihypertensive medications because of low BP currently  - CCU monitoring  - cardio f/u  - guarded prognosis    # Anemia: acute over chronic (baseline around 8 after surgery this month) - could be partially due to hemodilution (she received IVF for hypotension)  - f/u CBC, hold off on fluids, OOBtc when BP stable  - hold transfusion for now per cardio  - monitor for s/s bleeding    # ESTEFANY - could be due to hypotension   - keep SBP > 100 and monitor BMP and urine output    # Right UE edema - check venous duplex, elevate arm     # Leukocytosis - likely reactive - pt afebrile - continue to monitor    Diet: dash/tlc  dvt ppx: heparin     Activity: increase as tolerated  Code: full code 78y Female with PMH below presented to the hospital for typical chest pain. recent h/o surgery for # rt femur on 11/22/18.    # NSTEMI  - s/p CATH: stent in LAD and Lcx  - cw DAPT  - cw lipitor  - 2d echo: 50-55% ef  - hold antihypertensive medications because of low BP currently  - CCU monitoring  - cardio f/u  - guarded prognosis    # Anemia: acute over chronic (baseline around 8 after surgery this month) - could be partially due to hemodilution (she received IVF for hypotension)  - f/u CBC, hold off on fluids, OOBtc when BP stable  - hold transfusion for now per cardio  - monitor for s/s bleeding  - hgb 6.8 today, f/u cardio regarding transfusion     # ESTEFANY - could be due to hypotension   - keep SBP > 100 and monitor BMP and urine output    # Right UE edema - check venous duplex, elevate arm     # Leukocytosis - likely reactive - pt afebrile - continue to monitor    Diet: dash/tlc  dvt ppx: heparin     Activity: increase as tolerated  Code: full code

## 2018-12-01 NOTE — PROGRESS NOTE ADULT - ATTENDING COMMENTS
transfuse one unit  f/u cbc post transfusion  sl fever, no evidence of infection, ?post surgical  ESTEFANY, check urine studies transfuse one unit  f/u cbc post transfusion  sl fever, no evidence of infection, check urine/ blood cultures, cxr  ESTEFANY, check urine studies

## 2018-12-02 LAB
ALBUMIN SERPL ELPH-MCNC: 2.3 G/DL — LOW (ref 3.5–5.2)
ALP SERPL-CCNC: 102 U/L — SIGNIFICANT CHANGE UP (ref 30–115)
ALT FLD-CCNC: 41 U/L — SIGNIFICANT CHANGE UP (ref 0–41)
ANION GAP SERPL CALC-SCNC: 17 MMOL/L — HIGH (ref 7–14)
APPEARANCE UR: ABNORMAL
AST SERPL-CCNC: 38 U/L — SIGNIFICANT CHANGE UP (ref 0–41)
BACTERIA # UR AUTO: ABNORMAL /HPF
BASOPHILS # BLD AUTO: 0.02 K/UL — SIGNIFICANT CHANGE UP (ref 0–0.2)
BASOPHILS # BLD AUTO: 0.04 K/UL — SIGNIFICANT CHANGE UP (ref 0–0.2)
BASOPHILS # BLD AUTO: 0.21 K/UL — HIGH (ref 0–0.2)
BASOPHILS NFR BLD AUTO: 0.1 % — SIGNIFICANT CHANGE UP (ref 0–1)
BASOPHILS NFR BLD AUTO: 0.2 % — SIGNIFICANT CHANGE UP (ref 0–1)
BASOPHILS NFR BLD AUTO: 0.9 % — SIGNIFICANT CHANGE UP (ref 0–1)
BILIRUB SERPL-MCNC: 0.6 MG/DL — SIGNIFICANT CHANGE UP (ref 0.2–1.2)
BILIRUB UR-MCNC: NEGATIVE — SIGNIFICANT CHANGE UP
BUN SERPL-MCNC: 50 MG/DL — HIGH (ref 10–20)
C DIFF BY PCR RESULT: NEGATIVE — SIGNIFICANT CHANGE UP
C DIFF TOX GENS STL QL NAA+PROBE: SIGNIFICANT CHANGE UP
CALCIUM SERPL-MCNC: 7.8 MG/DL — LOW (ref 8.5–10.1)
CHLORIDE SERPL-SCNC: 105 MMOL/L — SIGNIFICANT CHANGE UP (ref 98–110)
CO2 SERPL-SCNC: 18 MMOL/L — SIGNIFICANT CHANGE UP (ref 17–32)
COLOR SPEC: YELLOW — SIGNIFICANT CHANGE UP
CREAT SERPL-MCNC: 2.3 MG/DL — HIGH (ref 0.7–1.5)
DIFF PNL FLD: ABNORMAL
EOSINOPHIL # BLD AUTO: 0.18 K/UL — SIGNIFICANT CHANGE UP (ref 0–0.7)
EOSINOPHIL # BLD AUTO: 0.42 K/UL — SIGNIFICANT CHANGE UP (ref 0–0.7)
EOSINOPHIL # BLD AUTO: 0.46 K/UL — SIGNIFICANT CHANGE UP (ref 0–0.7)
EOSINOPHIL NFR BLD AUTO: 0.8 % — SIGNIFICANT CHANGE UP (ref 0–8)
EOSINOPHIL NFR BLD AUTO: 2.3 % — SIGNIFICANT CHANGE UP (ref 0–8)
EOSINOPHIL NFR BLD AUTO: 2.4 % — SIGNIFICANT CHANGE UP (ref 0–8)
GIANT PLATELETS BLD QL SMEAR: PRESENT — SIGNIFICANT CHANGE UP
GLUCOSE SERPL-MCNC: 106 MG/DL — HIGH (ref 70–99)
GLUCOSE UR QL: NEGATIVE MG/DL — SIGNIFICANT CHANGE UP
HCT VFR BLD CALC: 23.2 % — LOW (ref 37–47)
HCT VFR BLD CALC: 24.4 % — LOW (ref 37–47)
HCT VFR BLD CALC: 24.8 % — LOW (ref 37–47)
HGB BLD-MCNC: 7.4 G/DL — CRITICAL LOW (ref 12–16)
HGB BLD-MCNC: 7.8 G/DL — LOW (ref 12–16)
HGB BLD-MCNC: 7.8 G/DL — LOW (ref 12–16)
IMM GRANULOCYTES NFR BLD AUTO: 0.8 % — HIGH (ref 0.1–0.3)
IMM GRANULOCYTES NFR BLD AUTO: 0.9 % — HIGH (ref 0.1–0.3)
IRON SATN MFR SERPL: 12 % — LOW (ref 15–50)
IRON SATN MFR SERPL: 20 UG/DL — LOW (ref 35–150)
KETONES UR-MCNC: NEGATIVE — SIGNIFICANT CHANGE UP
LEUKOCYTE ESTERASE UR-ACNC: ABNORMAL
LYMPHOCYTES # BLD AUTO: 0.21 K/UL — LOW (ref 1.2–3.4)
LYMPHOCYTES # BLD AUTO: 0.9 % — LOW (ref 20.5–51.1)
LYMPHOCYTES # BLD AUTO: 1.22 K/UL — SIGNIFICANT CHANGE UP (ref 1.2–3.4)
LYMPHOCYTES # BLD AUTO: 1.5 K/UL — SIGNIFICANT CHANGE UP (ref 1.2–3.4)
LYMPHOCYTES # BLD AUTO: 6.1 % — LOW (ref 20.5–51.1)
LYMPHOCYTES # BLD AUTO: 8.4 % — LOW (ref 20.5–51.1)
MAGNESIUM SERPL-MCNC: 2.2 MG/DL — SIGNIFICANT CHANGE UP (ref 1.8–2.4)
MANUAL SMEAR VERIFICATION: SIGNIFICANT CHANGE UP
MCHC RBC-ENTMCNC: 26.7 PG — LOW (ref 27–31)
MCHC RBC-ENTMCNC: 26.8 PG — LOW (ref 27–31)
MCHC RBC-ENTMCNC: 26.8 PG — LOW (ref 27–31)
MCHC RBC-ENTMCNC: 31.5 G/DL — LOW (ref 32–37)
MCHC RBC-ENTMCNC: 31.9 G/DL — LOW (ref 32–37)
MCHC RBC-ENTMCNC: 32 G/DL — SIGNIFICANT CHANGE UP (ref 32–37)
MCV RBC AUTO: 83.8 FL — SIGNIFICANT CHANGE UP (ref 81–99)
MCV RBC AUTO: 84.1 FL — SIGNIFICANT CHANGE UP (ref 81–99)
MCV RBC AUTO: 84.9 FL — SIGNIFICANT CHANGE UP (ref 81–99)
METAMYELOCYTES # FLD: 0.9 % — HIGH (ref 0–0)
MONOCYTES # BLD AUTO: 0.39 K/UL — SIGNIFICANT CHANGE UP (ref 0.1–0.6)
MONOCYTES # BLD AUTO: 1.25 K/UL — HIGH (ref 0.1–0.6)
MONOCYTES # BLD AUTO: 1.38 K/UL — HIGH (ref 0.1–0.6)
MONOCYTES NFR BLD AUTO: 1.7 % — SIGNIFICANT CHANGE UP (ref 1.7–9.3)
MONOCYTES NFR BLD AUTO: 6.9 % — SIGNIFICANT CHANGE UP (ref 1.7–9.3)
MONOCYTES NFR BLD AUTO: 7 % — SIGNIFICANT CHANGE UP (ref 1.7–9.3)
NEUTROPHILS # BLD AUTO: 14.5 K/UL — HIGH (ref 1.4–6.5)
NEUTROPHILS # BLD AUTO: 16.84 K/UL — HIGH (ref 1.4–6.5)
NEUTROPHILS # BLD AUTO: 21.48 K/UL — HIGH (ref 1.4–6.5)
NEUTROPHILS NFR BLD AUTO: 81.3 % — HIGH (ref 42.2–75.2)
NEUTROPHILS NFR BLD AUTO: 83.6 % — HIGH (ref 42.2–75.2)
NEUTROPHILS NFR BLD AUTO: 86.1 % — HIGH (ref 42.2–75.2)
NEUTS BAND # BLD: 7.8 % — HIGH (ref 0–6)
NITRITE UR-MCNC: NEGATIVE — SIGNIFICANT CHANGE UP
NRBC # BLD: 0 /100 WBCS — SIGNIFICANT CHANGE UP (ref 0–0)
PH UR: 7.5 — SIGNIFICANT CHANGE UP (ref 5–8)
PLAT MORPH BLD: NORMAL — SIGNIFICANT CHANGE UP
PLATELET # BLD AUTO: 489 K/UL — HIGH (ref 130–400)
PLATELET # BLD AUTO: 500 K/UL — HIGH (ref 130–400)
PLATELET # BLD AUTO: 506 K/UL — HIGH (ref 130–400)
POIKILOCYTOSIS BLD QL AUTO: SLIGHT — SIGNIFICANT CHANGE UP
POTASSIUM SERPL-MCNC: 4.4 MMOL/L — SIGNIFICANT CHANGE UP (ref 3.5–5)
POTASSIUM SERPL-SCNC: 4.4 MMOL/L — SIGNIFICANT CHANGE UP (ref 3.5–5)
PROT SERPL-MCNC: 5.4 G/DL — LOW (ref 6–8)
PROT UR-MCNC: >=300 MG/DL
RBC # BLD: 2.76 M/UL — LOW (ref 4.2–5.4)
RBC # BLD: 2.91 M/UL — LOW (ref 4.2–5.4)
RBC # BLD: 2.92 M/UL — LOW (ref 4.2–5.4)
RBC # FLD: 20 % — HIGH (ref 11.5–14.5)
RBC # FLD: 20.1 % — HIGH (ref 11.5–14.5)
RBC # FLD: 20.2 % — HIGH (ref 11.5–14.5)
RBC BLD AUTO: NORMAL — SIGNIFICANT CHANGE UP
RBC CASTS # UR COMP ASSIST: ABNORMAL /HPF
SODIUM SERPL-SCNC: 140 MMOL/L — SIGNIFICANT CHANGE UP (ref 135–146)
SP GR SPEC: 1.02 — SIGNIFICANT CHANGE UP (ref 1.01–1.03)
TIBC SERPL-MCNC: 172 UG/DL — LOW (ref 220–430)
TROPONIN T SERPL-MCNC: 3.65 NG/ML — CRITICAL HIGH
UIBC SERPL-MCNC: 152 UG/DL — SIGNIFICANT CHANGE UP (ref 110–370)
UROBILINOGEN FLD QL: 0.2 MG/DL — SIGNIFICANT CHANGE UP (ref 0.2–0.2)
VARIANT LYMPHS # BLD: 0.9 % — SIGNIFICANT CHANGE UP (ref 0–5)
WBC # BLD: 17.83 K/UL — HIGH (ref 4.8–10.8)
WBC # BLD: 20.12 K/UL — HIGH (ref 4.8–10.8)
WBC # BLD: 22.88 K/UL — HIGH (ref 4.8–10.8)
WBC # FLD AUTO: 17.83 K/UL — HIGH (ref 4.8–10.8)
WBC # FLD AUTO: 20.12 K/UL — HIGH (ref 4.8–10.8)
WBC # FLD AUTO: 22.88 K/UL — HIGH (ref 4.8–10.8)
WBC UR QL: >50 /HPF

## 2018-12-02 RX ORDER — VANCOMYCIN HCL 1 G
VIAL (EA) INTRAVENOUS
Qty: 0 | Refills: 0 | Status: DISCONTINUED | OUTPATIENT
Start: 2018-12-02 | End: 2018-12-02

## 2018-12-02 RX ORDER — MEROPENEM 1 G/30ML
1000 INJECTION INTRAVENOUS EVERY 12 HOURS
Qty: 0 | Refills: 0 | Status: DISCONTINUED | OUTPATIENT
Start: 2018-12-02 | End: 2018-12-03

## 2018-12-02 RX ADMIN — Medication 600 MILLIGRAM(S): at 18:26

## 2018-12-02 RX ADMIN — Medication 12.5 MILLIGRAM(S): at 18:26

## 2018-12-02 RX ADMIN — Medication 650 MILLIGRAM(S): at 21:14

## 2018-12-02 RX ADMIN — MEROPENEM 100 MILLIGRAM(S): 1 INJECTION INTRAVENOUS at 18:26

## 2018-12-02 RX ADMIN — Medication 600 MILLIGRAM(S): at 06:55

## 2018-12-02 RX ADMIN — HEPARIN SODIUM 5000 UNIT(S): 5000 INJECTION INTRAVENOUS; SUBCUTANEOUS at 06:55

## 2018-12-02 RX ADMIN — Medication 12.5 MILLIGRAM(S): at 06:56

## 2018-12-02 RX ADMIN — Medication 650 MILLIGRAM(S): at 20:08

## 2018-12-02 RX ADMIN — CLOPIDOGREL BISULFATE 75 MILLIGRAM(S): 75 TABLET, FILM COATED ORAL at 11:59

## 2018-12-02 RX ADMIN — HEPARIN SODIUM 5000 UNIT(S): 5000 INJECTION INTRAVENOUS; SUBCUTANEOUS at 21:15

## 2018-12-02 RX ADMIN — Medication 650 MILLIGRAM(S): at 08:30

## 2018-12-02 RX ADMIN — Medication 100 MILLIGRAM(S): at 11:59

## 2018-12-02 RX ADMIN — CHLORHEXIDINE GLUCONATE 1 APPLICATION(S): 213 SOLUTION TOPICAL at 06:55

## 2018-12-02 RX ADMIN — HEPARIN SODIUM 5000 UNIT(S): 5000 INJECTION INTRAVENOUS; SUBCUTANEOUS at 14:22

## 2018-12-02 RX ADMIN — ATORVASTATIN CALCIUM 80 MILLIGRAM(S): 80 TABLET, FILM COATED ORAL at 21:15

## 2018-12-02 RX ADMIN — Medication 650 MILLIGRAM(S): at 07:30

## 2018-12-02 RX ADMIN — Medication 81 MILLIGRAM(S): at 11:59

## 2018-12-02 NOTE — PROGRESS NOTE ADULT - ATTENDING COMMENTS
febrile with bandemia  levaquin started but low threshold to broaden abx to vanco, cefepime if not improved clinically  dapt  f/u cards recs febrile with bandemia  suspect uti, no h/o drug resistant organism  levaquin ok, monitor closely, clinically looks very well  dapt  f/u cards recs

## 2018-12-02 NOTE — PROGRESS NOTE ADULT - ASSESSMENT
78y Female with PMH below presented to the hospital for typical chest pain. recent h/o surgery for right femur on 11/22/18.    # SIRS (fever and leukocytosis)  - f/u CXR  - f/u UA and Ucx  - f/u Bcx  - c/w Tylenol PRN    # Anemia - acute on chronic  - s/p 1 pRBC   - No signs of active bleeding  - Fecal occult blood test  - Iron panel    # NSTEMI  - s/p CATH: stent in LAD and Lcx  - cw DAPT  - cw lipitor  - 2d echo: 50-55% ef  - hold antihypertensive medications because of low BP currently  - CCU monitoring  - cardio f/u  - guarded prognosis    # ESTEFANY - could be due to hypotension   - keep SBP > 100 and monitor BMP and urine output    # Right UE edema - check venous duplex, elevate arm     Diet: dash/tlc  dvt ppx: heparin     Activity: increase as tolerated  Code: full code 78y Female with PMH below presented to the hospital for typical chest pain. recent h/o surgery for right femur on 11/22/18.    # SIRS (fever and leukocytosis)  - CXR slightly worse in RLL   - Start Levaquin  mg q48 (renal dosing)  - f/u UA and Ucx  - Cath access C/D/I, no erythema, edema or discharge  - f/u Bcx  - c/w Tylenol PRN    # Anemia - acute on chronic  - s/p 1 pRBC   - No signs of active bleeding  - Fecal occult blood test - negative  - f/u iron panel    # NSTEMI  - s/p CATH: stent in LAD and Lcx  - cw DAPT  - cw lipitor  - 2d echo: 50-55% EF  - hold antihypertensive medications because of low BP currently  - CCU monitoring  - cardio f/u  - guarded prognosis    # ESTEFANY - could be due to hypotension   - keep SBP > 100 and monitor BMP and urine output    # Right UE edema - check venous duplex, elevate arm     Diet: dash/tlc  dvt ppx: heparin     Activity: increase as tolerated  Code: full code

## 2018-12-02 NOTE — PROGRESS NOTE ADULT - SUBJECTIVE AND OBJECTIVE BOX
SUBJECTIVE:    Patient is a 78y old Female who presents with a chief complaint of chest pain (02 Dec 2018 03:14)    Currently admitted to medicine with the primary diagnosis of STEMI (ST elevation myocardial infarction)     Today is hospital day 4d. This morning she is resting comfortably in bed and reports no new issues or overnight events.     PAST MEDICAL & SURGICAL HISTORY  Arthritis  Mitral valve disorder  Other irritable bowel syndrome  H/O knee surgery  History of hip surgery  Hypertension  No significant past surgical history    SOCIAL HISTORY:  Negative for smoking/alcohol/drug use.     ALLERGIES:  penicillin (Rash)    MEDICATIONS:  STANDING MEDICATIONS  aspirin  chewable 81 milliGRAM(s) Oral daily  atorvastatin 80 milliGRAM(s) Oral at bedtime  chlorhexidine 4% Liquid 1 Application(s) Topical <User Schedule>  clopidogrel Tablet 75 milliGRAM(s) Oral daily  docusate sodium 100 milliGRAM(s) Oral daily  guaiFENesin  milliGRAM(s) Oral every 12 hours  heparin  Injectable 5000 Unit(s) SubCutaneous every 8 hours  levoFLOXacin  Tablet 750 milliGRAM(s) Oral every 48 hours  metoprolol tartrate 12.5 milliGRAM(s) Oral two times a day    PRN MEDICATIONS  acetaminophen   Tablet .. 650 milliGRAM(s) Oral every 6 hours PRN  bisacodyl Suppository 10 milliGRAM(s) Rectal daily PRN    VITALS:   T(F): 99.3  HR: 88  BP: 111/47  RR: 24  SpO2: 97%    LABS:                        7.8    20.12 )-----------( 489      ( 02 Dec 2018 04:54 )             24.8     12-01    138  |  104  |  54<H>  ----------------------------<  98  4.7   |  20  |  2.3<H>    Ca    7.5<L>      01 Dec 2018 04:31    TPro  4.8<L>  /  Alb  2.1<L>  /  TBili  0.4  /  DBili  x   /  AST  47<H>  /  ALT  35  /  AlkPhos  77  12-01                  RADIOLOGY:    PHYSICAL EXAM:  GEN: No acute distress  LUNGS: Decreased sounds b/l bases  HEART: Regular  ABD: Soft, mild tenderness in RLQ, non-distended  EXT: NC/NC/NE/2+PP/WIGGINS  NEURO: AAOX3    Intravenous access: PIV SUBJECTIVE:    Patient is a 78y old Female who presents with a chief complaint of chest pain (02 Dec 2018 03:14)    Currently admitted to medicine with the primary diagnosis of STEMI (ST elevation myocardial infarction)     Today is hospital day 4d. This morning she is resting comfortably in bed and reports no new issues or overnight events.   no cp, sob, abd pain, fever  no sob, orthopnea, pnd, cough    PAST MEDICAL & SURGICAL HISTORY  Arthritis  Mitral valve disorder  Other irritable bowel syndrome  H/O knee surgery  History of hip surgery  Hypertension  No significant past surgical history    SOCIAL HISTORY:  Negative for smoking/alcohol/drug use.     ALLERGIES:  penicillin (Rash)    MEDICATIONS:  STANDING MEDICATIONS  aspirin  chewable 81 milliGRAM(s) Oral daily  atorvastatin 80 milliGRAM(s) Oral at bedtime  chlorhexidine 4% Liquid 1 Application(s) Topical <User Schedule>  clopidogrel Tablet 75 milliGRAM(s) Oral daily  docusate sodium 100 milliGRAM(s) Oral daily  guaiFENesin  milliGRAM(s) Oral every 12 hours  heparin  Injectable 5000 Unit(s) SubCutaneous every 8 hours  levoFLOXacin  Tablet 750 milliGRAM(s) Oral every 48 hours  metoprolol tartrate 12.5 milliGRAM(s) Oral two times a day    PRN MEDICATIONS  acetaminophen   Tablet .. 650 milliGRAM(s) Oral every 6 hours PRN  bisacodyl Suppository 10 milliGRAM(s) Rectal daily PRN    VITALS:   T(F): 99.3  HR: 88  BP: 111/47  RR: 24  SpO2: 97%    LABS:                        7.8    20.12 )-----------( 489      ( 02 Dec 2018 04:54 )             24.8     12-01    138  |  104  |  54<H>  ----------------------------<  98  4.7   |  20  |  2.3<H>    Ca    7.5<L>      01 Dec 2018 04:31    TPro  4.8<L>  /  Alb  2.1<L>  /  TBili  0.4  /  DBili  x   /  AST  47<H>  /  ALT  35  /  AlkPhos  77  12-01                  RADIOLOGY:    PHYSICAL EXAM:  GEN: No acute distress  LUNGS: Decreased sounds b/l bases  HEART: Regular  ABD: Soft, mild tenderness in RLQ, non-distended  EXT: NC/NC/NE/2+PP/WIGGINS  NEURO: AAOX3    Intravenous access: PIV

## 2018-12-03 LAB
ALBUMIN SERPL ELPH-MCNC: 2.2 G/DL — LOW (ref 3.5–5.2)
ALP SERPL-CCNC: 107 U/L — SIGNIFICANT CHANGE UP (ref 30–115)
ALT FLD-CCNC: 39 U/L — SIGNIFICANT CHANGE UP (ref 0–41)
ANION GAP SERPL CALC-SCNC: 17 MMOL/L — HIGH (ref 7–14)
AST SERPL-CCNC: 45 U/L — HIGH (ref 0–41)
BILIRUB SERPL-MCNC: 0.5 MG/DL — SIGNIFICANT CHANGE UP (ref 0.2–1.2)
BUN SERPL-MCNC: 40 MG/DL — HIGH (ref 10–20)
CALCIUM SERPL-MCNC: 7.6 MG/DL — LOW (ref 8.5–10.1)
CHLORIDE SERPL-SCNC: 103 MMOL/L — SIGNIFICANT CHANGE UP (ref 98–110)
CO2 SERPL-SCNC: 19 MMOL/L — SIGNIFICANT CHANGE UP (ref 17–32)
CREAT SERPL-MCNC: 1.7 MG/DL — HIGH (ref 0.7–1.5)
CULTURE RESULTS: SIGNIFICANT CHANGE UP
FERRITIN SERPL-MCNC: 452 NG/ML — HIGH (ref 15–150)
GLUCOSE SERPL-MCNC: 92 MG/DL — SIGNIFICANT CHANGE UP (ref 70–99)
MAGNESIUM SERPL-MCNC: 2.1 MG/DL — SIGNIFICANT CHANGE UP (ref 1.8–2.4)
OB PNL STL: NEGATIVE — SIGNIFICANT CHANGE UP
OB PNL STL: NEGATIVE — SIGNIFICANT CHANGE UP
PHOSPHATE SERPL-MCNC: 2.5 MG/DL — SIGNIFICANT CHANGE UP (ref 2.1–4.9)
POTASSIUM SERPL-MCNC: 4.3 MMOL/L — SIGNIFICANT CHANGE UP (ref 3.5–5)
POTASSIUM SERPL-SCNC: 4.3 MMOL/L — SIGNIFICANT CHANGE UP (ref 3.5–5)
PROT SERPL-MCNC: 5.1 G/DL — LOW (ref 6–8)
SODIUM SERPL-SCNC: 139 MMOL/L — SIGNIFICANT CHANGE UP (ref 135–146)
SPECIMEN SOURCE: SIGNIFICANT CHANGE UP
TROPONIN T SERPL-MCNC: 2.56 NG/ML — CRITICAL HIGH

## 2018-12-03 RX ORDER — DIPHENHYDRAMINE HCL 50 MG
25 CAPSULE ORAL EVERY 6 HOURS
Qty: 0 | Refills: 0 | Status: DISCONTINUED | OUTPATIENT
Start: 2018-12-03 | End: 2018-12-05

## 2018-12-03 RX ORDER — BACITRACIN ZINC 500 UNIT/G
1 OINTMENT IN PACKET (EA) TOPICAL DAILY
Qty: 0 | Refills: 0 | Status: DISCONTINUED | OUTPATIENT
Start: 2018-12-03 | End: 2018-12-05

## 2018-12-03 RX ORDER — CIPROFLOXACIN LACTATE 400MG/40ML
500 VIAL (ML) INTRAVENOUS EVERY 12 HOURS
Qty: 0 | Refills: 0 | Status: DISCONTINUED | OUTPATIENT
Start: 2018-12-03 | End: 2018-12-05

## 2018-12-03 RX ADMIN — Medication 12.5 MILLIGRAM(S): at 17:14

## 2018-12-03 RX ADMIN — Medication 25 MILLIGRAM(S): at 02:10

## 2018-12-03 RX ADMIN — Medication 81 MILLIGRAM(S): at 11:25

## 2018-12-03 RX ADMIN — CLOPIDOGREL BISULFATE 75 MILLIGRAM(S): 75 TABLET, FILM COATED ORAL at 11:25

## 2018-12-03 RX ADMIN — Medication 12.5 MILLIGRAM(S): at 06:47

## 2018-12-03 RX ADMIN — HEPARIN SODIUM 5000 UNIT(S): 5000 INJECTION INTRAVENOUS; SUBCUTANEOUS at 21:51

## 2018-12-03 RX ADMIN — Medication 500 MILLIGRAM(S): at 17:14

## 2018-12-03 RX ADMIN — ATORVASTATIN CALCIUM 80 MILLIGRAM(S): 80 TABLET, FILM COATED ORAL at 21:51

## 2018-12-03 RX ADMIN — Medication 600 MILLIGRAM(S): at 06:46

## 2018-12-03 RX ADMIN — CHLORHEXIDINE GLUCONATE 1 APPLICATION(S): 213 SOLUTION TOPICAL at 06:46

## 2018-12-03 RX ADMIN — HEPARIN SODIUM 5000 UNIT(S): 5000 INJECTION INTRAVENOUS; SUBCUTANEOUS at 06:47

## 2018-12-03 RX ADMIN — HEPARIN SODIUM 5000 UNIT(S): 5000 INJECTION INTRAVENOUS; SUBCUTANEOUS at 17:11

## 2018-12-03 RX ADMIN — Medication 25 MILLIGRAM(S): at 14:38

## 2018-12-03 RX ADMIN — Medication 600 MILLIGRAM(S): at 17:15

## 2018-12-03 RX ADMIN — Medication 25 MILLIGRAM(S): at 22:04

## 2018-12-03 NOTE — PROGRESS NOTE ADULT - ASSESSMENT
77 y/o woman with PMH of ORIF for right periprosthetic fracture on 11/22/18, arthritis, IBS (diarrhea predominant) and HTN presented with chest pain while at [facility].   She was diagnosed with NSTEMI and underwent PCI with stents to LAD and LCx. Hospital course complicated by acute anemia, orthostatic hypotension, ESTEFANY, urinary retention, fever, UTI and rash.    1. NSTEMI s/p stents to LAD and LCx  cardiac enzymes trending down  on ASA/Plavix/metoprolol/statin  cardio f/u    2. Fever - curve improving  ID consult appreciated - on Cipro to cover for urinary source of fever  f/u all cultures and WBC count  repeat CXR - pt without symptoms of pneumonia  monitor temps    3. Anemia (acute over chronic)  s/p transfusion over the weekend and Hgb improved and now stable  stool guaiac negative per notes  Ortho f/u appreciated - not due to right LE wound  monitor H/H    4. ESTEFANY - creatinine now improving after Edwards was placed for urinary retention  monitor urine outpt and BMP    5. Right UE hematoma/edema - venous duplex negative  elevate and warm compress and monitor    6. s/p recent ORIF for right periprosthetic LE fracture  rehab consult when on telemetry    7. Rash - chronic and intermittent per pt - better on benadryl  occurred before meropenem so doubt drug rash  seen by ID    8. DVT prophylaxis    9. Prognosis- guarded but pt improving

## 2018-12-03 NOTE — CHART NOTE - NSCHARTNOTEFT_GEN_A_CORE
78  Female with PMH of arthritis, mitral valve disorder, IBS, HTN, recent R femur surgery 11/22/18 who presented to the ED for chest pain.    # SIRS (fever and leukocytosis)  - likely UTI secondary to proteus mirabilis and e.coli  - continue ciprofloxacin 500 mg PO Q12H  - continue brennan cath for urinary retention  - cath access C/D/I, no erythema, edema or discharge  - bilateral opacities improving on cxr, stool c.diff neg  - f/u blood cx, stool cx, stool cell count  - c/w tylenol PRN    # Rash  - no s/sx of anaphylaxis  - not likely reaction to meropenem per ID but switched to cipro  - benadryl PRN    # Anemia - acute on chronic  - s/p 1 pRBC on 12/1  - no signs of active bleeding, FOBT neg    # NSTEMI  - s/p cath on 11/28 with stents in LAD and LCX  - 2D echo on 11/30 EF 50-55%  - c/w DAPT  - c/w lipitor and metoprolol  - downgrade to floor per Dr. Rodrigues    # ESTEFANY, improving  - keep SBP > 100  - monitor BMP and urine output  - continue brennan    # RUE edema   - venous duplex on 11/30 neg  - elevate arm     Diet: dash/tlc  Dvt ppx: heparin   Activity: increase as tolerated  Code: full code  Dispo: - downgrade to floor  - inpatient PT and social work consult for outpatient PT placed

## 2018-12-03 NOTE — PROGRESS NOTE ADULT - ASSESSMENT
#) nstemi  - 78y Female with PMH below presented to the hospital for typical chest pain. recent h/o surgery for right femur on 11/22/18.    # SIRS (fever and leukocytosis)  - CXR slightly worse in RLL   - Start Levaquin  mg q48 (renal dosing)  - f/u UA and Ucx  - Cath access C/D/I, no erythema, edema or discharge  - f/u Bcx  - c/w Tylenol PRN    # Anemia - acute on chronic  - s/p 1 pRBC   - No signs of active bleeding  - Fecal occult blood test - negative  - f/u iron panel    # NSTEMI  - s/p CATH: stent in LAD and Lcx  - cw DAPT  - cw lipitor  - 2d echo: 50-55% EF  - hold antihypertensive medications because of low BP currently  - CCU monitoring  - cardio f/u  - guarded prognosis    # ESTEFANY - could be due to hypotension   - keep SBP > 100 and monitor BMP and urine output    # Right UE edema - check venous duplex, elevate arm     Diet: dash/tlc  dvt ppx: heparin     Activity: increase as tolerated  Code: full code 78y Female with PMH of arthritis, mitral valve disorder, IBS, HTN, recent R femur surgery 11/22/18 who presented to the ED for chest pain.    # SIRS (fever and leukocytosis)  - likely UTI secondary to proteus mirabilis and e.coli  - continue ciprofloxacin 500 mg PO Q12H  - continue brennan cath for urinary retention  - cath access C/D/I, no erythema, edema or discharge  - bilateral opacities improving on cxr, stool c.diff neg  - f/u blood cx, stool cx, stool cell count  - c/w tylenol PRN    # Anemia - acute on chronic  - s/p 1 pRBC on 12/1  - no signs of active bleeding, FOBT neg    # NSTEMI  - s/p cath on 11/28 with stents in LAD and LCX  - 2D echo on 11/30 EF 50-55%  - c/w DAPT  - c/w lipitor and metoprolol  - downgrade to floor per Dr. Rodrigues    # ESTEFANY, improving  - keep SBP > 100  - monitor BMP and urine output    # RUE edema   - venous duplex on 11/30 neg  - elevate arm     Diet: dash/tlc  Dvt ppx: heparin   Activity: increase as tolerated  Code: full code  Dispo: - downgrade to floor  - inpatient PT and social work consult for outpatient PT placed 78y Female with PMH of arthritis, mitral valve disorder, IBS, HTN, recent R femur surgery 11/22/18 who presented to the ED for chest pain.    # SIRS (fever and leukocytosis)  - likely UTI secondary to proteus mirabilis and e.coli  - continue ciprofloxacin 500 mg PO Q12H  - continue brennan cath for urinary retention  - cath access C/D/I, no erythema, edema or discharge  - bilateral opacities improving on cxr, stool c.diff neg  - f/u blood cx, stool cx, stool cell count  - c/w tylenol PRN    # Rash  - no s/sx of anaphylaxis  - not likely reaction to meropenem per ID but switched to cipro  - benadryl PRN    # Anemia - acute on chronic  - s/p 1 pRBC on 12/1  - no signs of active bleeding, FOBT neg    # NSTEMI  - s/p cath on 11/28 with stents in LAD and LCX  - 2D echo on 11/30 EF 50-55%  - c/w DAPT  - c/w lipitor and metoprolol  - downgrade to floor per Dr. Rodrigues    # ESTEFANY, improving  - keep SBP > 100  - monitor BMP and urine output  - continue brennan    # RUE edema   - venous duplex on 11/30 neg  - elevate arm     Diet: dash/tlc  Dvt ppx: heparin   Activity: increase as tolerated  Code: full code  Dispo: - downgrade to floor  - inpatient PT and social work consult for outpatient PT placed

## 2018-12-03 NOTE — CONSULT NOTE ADULT - SUBJECTIVE AND OBJECTIVE BOX
KAROLINAAGUSTÍN  78y, Female  Allergy: penicillin (Rash; Anaphylaxis; Hives)      HPI:  78yFemale with PMH below presented to the hospital for chest pain that radiated up her jaw and into her left arm while she was performing PT. She took two aspirin earlier and her pain resolved. She states that she thought it was from manipulation during her PT but got better with aspirin. She denies similar pain previously. She currently endorses pain only when she takes a deep breath and feels a soreness. Denies shortness of breath.   found to have  ST depressions in V2 and V3 with borderline DOLORES in inferior leads.  	  ROS negative except as above (2018 13:33)    History of rash in the back, now more pruritus than what she generally has.   No  complaints PTA  Now with brennan catheter    FAMILY HISTORY:  No pertinent family history in first degree relatives    PAST MEDICAL & SURGICAL HISTORY:  Arthritis  Mitral valve disorder  Other irritable bowel syndrome  H/O knee surgery  History of hip surgery  Hypertension  No significant past surgical history        VITALS:  T(F): 97.8, Max: 100.6 (18 @ 07:50)  HR: 92  BP: 92/49  RR: 31Vital Signs Last 24 Hrs  T(C): 36.6 (03 Dec 2018 00:00), Max: 38.1 (02 Dec 2018 07:50)  T(F): 97.8 (03 Dec 2018 00:00), Max: 100.6 (02 Dec 2018 07:50)  HR: 92 (03 Dec 2018 02:00) (84 - 110)  BP: 92/49 (03 Dec 2018 00:00) (92/49 - 127/55)  BP(mean): 73 (02 Dec 2018 18:10) (66 - 84)  RR: 31 (03 Dec 2018 02:00) (16 - 31)  SpO2: 98% (03 Dec 2018 02:00) (97% - 99%)    TESTS & MEASUREMENTS:                        7.8    22.88 )-----------( 506      ( 02 Dec 2018 11:58 )             24.4     12    140  |  105  |  50<H>  ----------------------------<  106<H>  4.4   |  18  |  2.3<H>    Ca    7.8<L>      02 Dec 2018 04:54  Mg     2.2         TPro  5.4<L>  /  Alb  2.3<L>  /  TBili  0.6  /  DBili  x   /  AST  38  /  ALT  41  /  AlkPhos  102      LIVER FUNCTIONS - ( 02 Dec 2018 04:54 )  Alb: 2.3 g/dL / Pro: 5.4 g/dL / ALK PHOS: 102 U/L / ALT: 41 U/L / AST: 38 U/L / GGT: x             Culture - Urine (collected 18 @ 09:08)  Source: .Urine Catheterized  Final Report (18 @ 15:36):    50,000 - 99,000 CFU/mL Proteus mirabilis    >100,000 CFU/ml Escherichia coli  Organism: Proteus mirabilis  Escherichia coli (18 @ 15:36)  Organism: Escherichia coli (18 @ 15:36)      -  Amikacin: S <=8      -  Amoxicillin/Clavulanic Acid: S <=8/4      -  Ampicillin: R >16 These ampicillin results predict results for amoxicillin      -  Ampicillin/Sulbactam: I 16/8      -  Aztreonam: S <=4      -  Cefazolin: S <=2 For uncomplicated UTI with K. pneumoniae, E. coli, or P. mirablis: MANUEL <=16 is sensitive and MANUEL >=32 is resistant. This also predicts results for oral agents cefaclor, cefdinir, cefpodoxime, cefprozil, cefuroxime axetil, cephalexin and locarbef for uncomplicated UTI. Note that some isolates may be susceptible to these agents while testing resistant to cefazolin.      -  Cefepime: S <=2      -  Cefoxitin: S <=4      -  Ceftriaxone: S <=1 Enterobacter, Citrobacter, and Serratia may develop resistance during prolonged therapy      -  Ciprofloxacin: S <=0.5      -  Ertapenem: S <=0.5      -  Gentamicin: S <=1      -  Imipenem: S <=1      -  Levofloxacin: S <=1      -  Meropenem: S <=1      -  Nitrofurantoin: S <=32 Should not be used to treat pyelonephritis      -  Piperacillin/Tazobactam: S <=8      -  Tigecycline: S <=1      -  Tobramycin: S <=2      -  Trimethoprim/Sulfamethoxazole: R >2/38      Method Type: MANUEL  Organism: Proteus mirabilis (18 @ 15:36)      -  Amikacin: S 16      -  Amoxicillin/Clavulanic Acid: S <=8/4      -  Ampicillin: S <=2 These ampicillin results predict results for amoxicillin      -  Ampicillin/Sulbactam: S <=4/2      -  Aztreonam: S <=4      -  Cefazolin: S <=2 For uncomplicated UTI with K. pneumoniae, E. coli, or P. mirablis: MANUEL <=16 is sensitive and MANUEL >=32 is resistant. This also predicts results for oral agents cefaclor, cefdinir, cefpodoxime, cefprozil, cefuroxime axetil, cephalexin and locarbef for uncomplicated UTI. Note that some isolates may be susceptible to these agents while testing resistant to cefazolin.      -  Cefepime: S <=2      -  Cefoxitin: S <=4      -  Ceftriaxone: S <=1 Enterobacter, Citrobacter, and Serratia may develop resistance during prolonged therapy      -  Ciprofloxacin: S <=0.5      -  Ertapenem: S <=0.5      -  Gentamicin: S <=1      -  Levofloxacin: S <=1      -  Meropenem: S <=1      -  Nitrofurantoin: R >64 Should not be used to treat pyelonephritis      -  Piperacillin/Tazobactam: S <=8      -  Tobramycin: S <=2      -  Trimethoprim/Sulfamethoxazole: S <=0.5/9.5      Method Type: MANUEL      Urinalysis Basic - ( 02 Dec 2018 09:00 )    Color: Yellow / Appearance: Turbid / S.020 / pH: x  Gluc: x / Ketone: Negative  / Bili: Negative / Urobili: 0.2 mg/dL   Blood: x / Protein: >=300 mg/dL / Nitrite: Negative   Leuk Esterase: Large / RBC: 3-5 /HPF / WBC >50 /HPF   Sq Epi: x / Non Sq Epi: x / Bacteria: Many /HPF          RADIOLOGY & ADDITIONAL TESTS:    ANTIBIOTICS:  meropenem  IVPB 1000 milliGRAM(s) IV Intermittent every 12 hours

## 2018-12-03 NOTE — CHART NOTE - NSCHARTNOTEFT_GEN_A_CORE
Called to bedside for patient with widespread rash on back. Patient states she has a chronic intermittent rash/hives on her back with pruritus that is relieved with Benadryl. She has never gone for allergy testing to determine the etiology. It is not new since this admission. On physical exam, there is a patchy erythematous rash across her back with the appearance of forming urticaria. Of note, patient has a documented penicillin allergy (widespread hives, "throat closing") and was started on meropenem today for UTI/PNA. Per pharmacist, meropenem has a 5-7% cross reactivity with PCN. However, patient states the rash is chronic and appeared before initiation of this antibiotic. Vitals are stable. Benadryl was ordered.

## 2018-12-03 NOTE — CONSULT NOTE ADULT - SUBJECTIVE AND OBJECTIVE BOX
Patient is a 78y old  Female who presents with a chief complaint of chest pain (03 Dec 2018 11:15)    HPI:  78yFemale with PMH below presented to the hospital for chest pain that radiated up her jaw and into her left arm while she was performing PT. She took two aspirin earlier and her pain resolved. She states that she thought it was from manipulation during her PT but got better with aspirin. She denies similar pain previously. She currently endorses pain only when she takes a deep breath and feels a soreness. Denies shortness of breath.   found to have  ST depressions in V2 and V3 with borderline DOLORES in inferior leads.  	  ROS negative except as above (2018 13:33)      PAST MEDICAL & SURGICAL HISTORY:  Arthritis  Mitral valve disorder  Other irritable bowel syndrome  H/O knee surgery  History of hip surgery  Hypertension  No significant past surgical history      Hospital Course: Currently admitted to CCU for NSTEMI s/p cath with stents in LAD and LCX on , hospital day 5.    Overnight, developed urticarial rash on back similar to prior chronic rash treated with benadryl at home. ID consulted, recommended d/c meropenem and switch to cipro 500 mg PO Q12H. Also complaining of multiple episodes of loose stool. Stool culture sent, c.diff neg, pending stool culture/cell count. No cp, sob, abd pain.  + Edwards on antibiotics secondary to UTI    TODAY'S SUBJECTIVE & REVIEW OF SYMPTOMS:     Constitutional WNL   Cardio WNL   Resp WNL   GI WNL  Heme WNL  Endo WNL  Skin WNL  MSK Bad arthritis L Hip  Neuro WNL  Cognitive WNL  Psych WNL      MEDICATIONS  (STANDING):  aspirin  chewable 81 milliGRAM(s) Oral daily  atorvastatin 80 milliGRAM(s) Oral at bedtime  chlorhexidine 4% Liquid 1 Application(s) Topical <User Schedule>  ciprofloxacin     Tablet 500 milliGRAM(s) Oral every 12 hours  clopidogrel Tablet 75 milliGRAM(s) Oral daily  docusate sodium 100 milliGRAM(s) Oral daily  guaiFENesin  milliGRAM(s) Oral every 12 hours  heparin  Injectable 5000 Unit(s) SubCutaneous every 8 hours  metoprolol tartrate 12.5 milliGRAM(s) Oral two times a day    MEDICATIONS  (PRN):  acetaminophen   Tablet .. 650 milliGRAM(s) Oral every 6 hours PRN Temp greater or equal to 38C (100.4F)  bisacodyl Suppository 10 milliGRAM(s) Rectal daily PRN Constipation  diphenhydrAMINE 25 milliGRAM(s) Oral every 6 hours PRN Rash and/or Itching      FAMILY HISTORY:  No pertinent family history in first degree relatives      Allergies    penicillin (Rash; Anaphylaxis; Hives)    Intolerances        SOCIAL HISTORY:    [    ] Etoh  [    ] Smoking  [    ] Substance abuse     Home Environment:  [ x   ] Home Alone  [    ] Lives with Family  [    ] Home Health Aid    Dwelling:  [    ] Apartment  [    ] Private House  [    ] Adult Home  [ x   ] Skilled Nursing Facility      [  x  ] Short Term  [    ] Long Term  [    ] Stairs                           [    ] Elevator   AT [facility] FOR  REHAB ONE DAY SP ORIF PERIPROSTHETIC FX R DISTAL FEMUR    FUNCTIONAL STATUS PTA: (Check all that apply)  Ambulation: [     ]Independent    [    x] Dependent     [    ] Non-Ambulatory  Assistive Device: [    ] SA Cane  [    ]  Q Cane  [    ] Walker  [    ]  Wheelchair  ADL : [    ] Independent  [  x  ]  Dependent       Vital Signs Last 24 Hrs  T(C): 37.1 (03 Dec 2018 12:00), Max: 38.1 (02 Dec 2018 20:00)  T(F): 98.7 (03 Dec 2018 12:00), Max: 100.5 (02 Dec 2018 20:00)  HR: 100 (03 Dec 2018 14:00) (84 - 110)  BP: 100/46 (03 Dec 2018 14:00) (92/49 - 116/51)  BP(mean): 66 (03 Dec 2018 14:00) (61 - 73)  RR: 22 (03 Dec 2018 14:00) (15 - 31)  SpO2: 98% (03 Dec 2018 14:00) (93% - 99%)      PHYSICAL EXAM: Alert & Oriented X3  GENERAL: NAD, well-groomed, well-developed  HEAD:  Atraumatic, Normocephalic  EYES: EOMI, PERRLA, conjunctiva and sclera clear  NECK: Supple, No JVD, Normal thyroid  CHEST/LUNG: Clear bilaterally; No rales, rhonchi, wheezing, or rubs  HEART: Regular rate and rhythm; No murmurs, rubs, or gallops  ABDOMEN: Soft, Nontender, Nondistended; Bowel sounds present +Edwards  EXTREMITIES:  R Thigh Swollen Incision intact no calf tenderness- InZimmer  RUE with diffuse ecchymoses nontender    NERVOUS SYSTEM:  Cranial Nerves 2-12 intact [  x  ] Abnormal  [    ]  ROM: WFL all extremities [    ]  Abnormal [  x   ] R knee not tested  Motor Strength: WFL all extremities  [    ]  Abnormal [ x   ]SLR 3-/5 RLE   Sensation: intact to light touch [x    ] Abnormal [    ]      FUNCTIONAL STATUS:  Bed Mobility: [   ]  Independent [    ]  Supervision [  x  ]  Needs Assistance [  ]  N/A  Transfers: [    ]  Independent [    ]  Supervision [    ]  Needs Assistance [x    ]  N/A    Ambulation:  [    ]  Independent [    ]  Supervision [    ]  Needs Assistance [ x   ]  N/A   ADL:  [    ]   Independent [   x ] Requires Assistance [    ] N/A       LABS:                        7.8    22.88 )-----------( 506      ( 02 Dec 2018 11:58 )             24.4     12-    139  |  103  |  40<H>  ----------------------------<  92  4.3   |  19  |  1.7<H>    Ca    7.6<L>      03 Dec 2018 04:05  Phos  2.5     12  Mg     2.1         TPro  5.1<L>  /  Alb  2.2<L>  /  TBili  0.5  /  DBili  x   /  AST  45<H>  /  ALT  39  /  AlkPhos  107  12      Urinalysis Basic - ( 02 Dec 2018 09:00 )    Color: Yellow / Appearance: Turbid / S.020 / pH: x  Gluc: x / Ketone: Negative  / Bili: Negative / Urobili: 0.2 mg/dL   Blood: x / Protein: >=300 mg/dL / Nitrite: Negative   Leuk Esterase: Large / RBC: 3-5 /HPF / WBC >50 /HPF   Sq Epi: x / Non Sq Epi: x / Bacteria: Many /HPF        RADIOLOGY & ADDITIONAL STUDIES:

## 2018-12-03 NOTE — PROGRESS NOTE ADULT - SUBJECTIVE AND OBJECTIVE BOX
Cardiology Follow up    AGUSTÍN TURCIOS   78yFemale  PAST MEDICAL & SURGICAL HISTORY:  Arthritis  Mitral valve disorder  Other irritable bowel syndrome  H/O knee surgery  History of hip surgery  Hypertension  No significant past surgical history    Allergies    penicillin (Rash; Anaphylaxis; Hives)    Intolerances        Patient without complaints.   Denies CP, SOB, palpitations, or dizziness  No events on telemetry overnight    Vital Signs Last 24 Hrs  T(C): 36.4 (03 Dec 2018 04:00), Max: 38.1 (02 Dec 2018 20:00)  T(F): 97.6 (03 Dec 2018 04:00), Max: 100.5 (02 Dec 2018 20:00)  HR: 84 (03 Dec 2018 06:00) (84 - 110)  BP: 113/47 (03 Dec 2018 06:00) (92/49 - 127/55)  BP(mean): 73 (02 Dec 2018 18:10) (67 - 84)  RR: 18 (03 Dec 2018 06:00) (15 - 31)  SpO2: 97% (03 Dec 2018 06:00) (93% - 99%)Allergies    penicillin (Rash; Anaphylaxis; Hives)    Intolerances    REVIEW OF SYSTEMS:    CONSTITUTIONAL: No weakness, fevers or chills  EYES/ENT: No visual changes;  No vertigo or throat pain   NECK: No pain or stiffness  RESPIRATORY: No cough, wheezing, hemoptysis; No shortness of breath  CARDIOVASCULAR: No chest pain or palpitations  GASTROINTESTINAL: No abdominal or epigastric pain. No nausea, vomiting, or hematemesis; No diarrhea or constipation. No melena or hematochezia.  GENITOURINARY: No dysuria, frequency or hematuria  NEUROLOGICAL: No numbness or weakness  SKIN: No itching, rashes      NAD, appears well  S1S2, no murmurs, no JVD  CTA B/L, no wheeze, no rales  SNT +BS  Ext:    Right Groin:  NO hematoma or bleeeding    NO bruit, ; C/D/I , + pulses  	   Right  Radial : NO   hematoma or  bleeding C/D/I  , + pulses, R FA and UE ecchymotic and swollen , no pain, + pulses    Pulses:  +Rad/ +PTs /+DPs/ same as baseline  A&Ox 3    EKG        P                                                                                                             2D ECHO   EXAM:  2-D ECHO (TTE) COMPLETE        PROCEDURE DATE:  11/30/2018      INTERPRETATION:  REPORT:    TRANSTHORACIC ECHOCARDIOGRAM REPORT         Patient Name:   AGUSTÍN TURCIOS Accession #: [id number]  Medical Rec #:  [id number]         Height:      68.0 in 172.7 cm  YOB: 1940        Weight:      188.0 lb 85.28 kg  Patient Age:    78 years         BSA:         1.99 m²  Patient Gender: F                BP:          121/54 mmHg       Date of Exam:        11/30/2018 7:10:05 AM  Referring Physician: [id number] ALL LARRY  Sonographer:         Vinicius Montes  Reading Physician:   Shubham Orozco M.D.    Procedure:   2D Echo/Doppler/Color Doppler Complete.  Indications: R07.9 - Chest Pain, unspecified  Diagnosis:   Chest Pain, unspecified         Summary:   1. Left ventricular ejection fraction, by visual estimation, is 50 to   55%.   2. Normal global left ventricular systolic function.   3. Moderate tricuspid regurgitation.   4. PSAP at least 55.   5. Trace pulmonic valve regurgitation.    PHYSICIAN INTERPRETATION:  Left Ventricle: The left ventricular internal cavity size is normal. Left   ventricular wall thickness is normal. Global LV systolic function was   normal. Left ventricular ejection fraction, by visual estimation, is 50  to 55%.  Right Ventricle: Normal right ventricular size and function.  Left Atrium: Mild to moderately enlarged left atrium.  Right Atrium: Mildly enlarged right atrium.  Pericardium: There is no evidence of pericardial effusion.  Mitral Valve: Trace mitral valve regurgitation is seen. The mitral valve   is normal in structure.  Tricuspid Valve: Moderate tricuspid regurgitation is visualized. PSAP at   least 55.  Aortic Valve: No evidence of aortic valve regurgitation is seen. The   aortic valve is trileaflet. No evidence of aortic stenosis.  Pulmonic Valve: Trace pulmonic valve regurgitation.  Aorta: The aortic root is normal in size and structure.       2D AND M-MODE MEASUREMENTS (normal ranges within parentheses):  Left                 Normal    Aorta/Left           Normal  Ventricle:                     Atrium:  IVSd (2D):  1.04 cm  (0.7-1.1) AoV Cusp      1.98   (1.5-2.6)  LVPWd (2D): 0.96 cm  (0.7-1.1) Separation:   cm  LVIDd (2D): 5.24 cm  (3.4-5.7) Left Atrium   4.48   (1.9-4.0)  LVIDs (2D): 2.93 cm            (Mmode):      cm  LV FS (2D): 44.0 %   (>25%)    LA Volume     28.2  Relative    0.37     (<0.42)   Index         ml/m²  Wall  Thickness    SPECTRAL DOPPLER ANALYSIS:  LV DIASTOLIC FUNCTION:  MV Peak E: 0.75 m/s Decel Time: 239 msec  MV Peak A: 0.64 m/s  E/A Ratio: 1.18    Aortic Valve:  AoV VMax:    1.85 m/s  AoV Area, Vmax: 2.16 cm² Vmax Indx: 1.08 cm²/m²  AoV Pk Grad: 13.7 mmHg    LVOT Vmax: 1.33 m/s  LVOT VTI:  0.24 m  LVOT Diam: 1.96 cm    Mitral Valve:  MV P1/2 Time: 69.22 msec  MV Area, PHT: 3.18 cm²    Tricuspid Valve and PA/RV Systolic Pressure: TR Max Velocity: 3.63 m/s RA   Pressure:  RVSP/PASP: 55.7 mmHg    Pulmonic Valve:  PV Max Velocity: 1.60 m/s PV Max PG: 10.2 mmHg PV Mean PG:       [id number] Shubham Orozco M.D., Electronically signed on 11/30/2018 at   10:41:21 AM              *** Final ***                    SHUBHAM OROZCO MD  This document has been electronically signed. Nov 30 2018  7:10AM    LABS                        7.8    22.88 )-----------( 506      ( 02 Dec 2018 11:58 )             24.4     12-03    139  |  103  |  40<H>  ----------------------------<  92  4.3   |  19  |  1.7<H>    Ca    7.6<L>      03 Dec 2018 04:05  Phos  2.5     12-03  Mg     2.1     12-03    TPro  5.1<L>  /  Alb  2.2<L>  /  TBili  0.5  /  DBili  x   /  AST  45<H>  /  ALT  39  /  AlkPhos  107  12-03    CARDIAC MARKERS ( 03 Dec 2018 04:05 )  x     / 2.56 ng/mL / x     / x     / x      CARDIAC MARKERS ( 02 Dec 2018 04:54 )  x     / 3.65 ng/mL / x     / x     / x          Magnesium, Serum: 2.1 mg/dL [1.8 - 2.4] (12-03-18 @ 04:05)  LIVER FUNCTIONS - ( 03 Dec 2018 04:05 )  Alb: 2.2 g/dL / Pro: 5.1 g/dL / ALK PHOS: 107 U/L / ALT: 39 U/L / AST: 45 U/L / GGT: x                 A/P:  I discussed the case with Interventional Cardiologist Dr. Rodrigues & recommends the following:    S/P PCI pLCX and mLAD cobra stent x 2  	         Continue DAPT,(asa 81mg, plavix 75 mg daily) Statin Therapy, BB                    monitor H/H, monitor Cr, trending down                     EF n/l and CE trending down                     R arm ecchymotic and swollen , + pulses, duplex negative, continue warm compresses and elevation                    Pt given instructions on importance of taking antiplatelet medication or risk acute stent thrombosis/death                   Post cath instructions, access site care and activity restrictions reviewed with patient                     Discussed with patient to return to hospital if experience chest pain, shortness breath, dizziness and site bleeding                   Aggressive risk factor modification, diet counseling, smoking cessation discussed with patient                      Follow up with Dr. Rodrigues regarding disposition Cardiology Follow up    AGUSTÍN TURCIOS   78yFemale  PAST MEDICAL & SURGICAL HISTORY:  Arthritis  Mitral valve disorder  Other irritable bowel syndrome  H/O knee surgery  History of hip surgery  Hypertension  No significant past surgical history    Allergies    penicillin (Rash; Anaphylaxis; Hives)    Intolerances        Patient without complaints.   Denies CP, SOB, palpitations, or dizziness  No events on telemetry overnight    Vital Signs Last 24 Hrs  T(C): 36.4 (03 Dec 2018 04:00), Max: 38.1 (02 Dec 2018 20:00)  T(F): 97.6 (03 Dec 2018 04:00), Max: 100.5 (02 Dec 2018 20:00)  HR: 84 (03 Dec 2018 06:00) (84 - 110)  BP: 113/47 (03 Dec 2018 06:00) (92/49 - 127/55)  BP(mean): 73 (02 Dec 2018 18:10) (67 - 84)  RR: 18 (03 Dec 2018 06:00) (15 - 31)  SpO2: 97% (03 Dec 2018 06:00) (93% - 99%)Allergies    penicillin (Rash; Anaphylaxis; Hives)    Intolerances    REVIEW OF SYSTEMS:    CONSTITUTIONAL: No weakness, fevers or chills  EYES/ENT: No visual changes;  No vertigo or throat pain   NECK: No pain or stiffness  RESPIRATORY: No cough, wheezing, hemoptysis; No shortness of breath  CARDIOVASCULAR: No chest pain or palpitations  GASTROINTESTINAL: No abdominal or epigastric pain. No nausea, vomiting, or hematemesis; No diarrhea or constipation. No melena or hematochezia.  GENITOURINARY: No dysuria, frequency or hematuria  NEUROLOGICAL: No numbness or weakness  SKIN: No itching, rashes      NAD, appears well  S1S2, no murmurs, no JVD  CTA B/L, no wheeze, no rales  SNT +BS  Ext:    Right Groin:  NO hematoma or bleeeding    NO bruit, ; C/D/I , + pulses  	   Right  Radial : NO   hematoma or  bleeding C/D/I  , + pulses, R FA and UE ecchymotic and swollen , no pain, + pulses    Pulses:  +Rad/ +PTs /+DPs/ same as baseline  A&Ox 3    EKG        P                                                                                                             2D ECHO   EXAM:  2-D ECHO (TTE) COMPLETE        PROCEDURE DATE:  11/30/2018      INTERPRETATION:  REPORT:    TRANSTHORACIC ECHOCARDIOGRAM REPORT         Patient Name:   AGUSTÍN TURCIOS Accession #: [id number]  Medical Rec #:  [id number]         Height:      68.0 in 172.7 cm  YOB: 1940        Weight:      188.0 lb 85.28 kg  Patient Age:    78 years         BSA:         1.99 m²  Patient Gender: F                BP:          121/54 mmHg       Date of Exam:        11/30/2018 7:10:05 AM  Referring Physician: [id number] ALL LARRY  Sonographer:         Vinicius Montes  Reading Physician:   Shubham Orozco M.D.    Procedure:   2D Echo/Doppler/Color Doppler Complete.  Indications: R07.9 - Chest Pain, unspecified  Diagnosis:   Chest Pain, unspecified         Summary:   1. Left ventricular ejection fraction, by visual estimation, is 50 to   55%.   2. Normal global left ventricular systolic function.   3. Moderate tricuspid regurgitation.   4. PSAP at least 55.   5. Trace pulmonic valve regurgitation.    PHYSICIAN INTERPRETATION:  Left Ventricle: The left ventricular internal cavity size is normal. Left   ventricular wall thickness is normal. Global LV systolic function was   normal. Left ventricular ejection fraction, by visual estimation, is 50  to 55%.  Right Ventricle: Normal right ventricular size and function.  Left Atrium: Mild to moderately enlarged left atrium.  Right Atrium: Mildly enlarged right atrium.  Pericardium: There is no evidence of pericardial effusion.  Mitral Valve: Trace mitral valve regurgitation is seen. The mitral valve   is normal in structure.  Tricuspid Valve: Moderate tricuspid regurgitation is visualized. PSAP at   least 55.  Aortic Valve: No evidence of aortic valve regurgitation is seen. The   aortic valve is trileaflet. No evidence of aortic stenosis.  Pulmonic Valve: Trace pulmonic valve regurgitation.  Aorta: The aortic root is normal in size and structure.       2D AND M-MODE MEASUREMENTS (normal ranges within parentheses):  Left                 Normal    Aorta/Left           Normal  Ventricle:                     Atrium:  IVSd (2D):  1.04 cm  (0.7-1.1) AoV Cusp      1.98   (1.5-2.6)  LVPWd (2D): 0.96 cm  (0.7-1.1) Separation:   cm  LVIDd (2D): 5.24 cm  (3.4-5.7) Left Atrium   4.48   (1.9-4.0)  LVIDs (2D): 2.93 cm            (Mmode):      cm  LV FS (2D): 44.0 %   (>25%)    LA Volume     28.2  Relative    0.37     (<0.42)   Index         ml/m²  Wall  Thickness    SPECTRAL DOPPLER ANALYSIS:  LV DIASTOLIC FUNCTION:  MV Peak E: 0.75 m/s Decel Time: 239 msec  MV Peak A: 0.64 m/s  E/A Ratio: 1.18    Aortic Valve:  AoV VMax:    1.85 m/s  AoV Area, Vmax: 2.16 cm² Vmax Indx: 1.08 cm²/m²  AoV Pk Grad: 13.7 mmHg    LVOT Vmax: 1.33 m/s  LVOT VTI:  0.24 m  LVOT Diam: 1.96 cm    Mitral Valve:  MV P1/2 Time: 69.22 msec  MV Area, PHT: 3.18 cm²    Tricuspid Valve and PA/RV Systolic Pressure: TR Max Velocity: 3.63 m/s RA   Pressure:  RVSP/PASP: 55.7 mmHg    Pulmonic Valve:  PV Max Velocity: 1.60 m/s PV Max PG: 10.2 mmHg PV Mean PG:       [id number] Shubham Orozco M.D., Electronically signed on 11/30/2018 at   10:41:21 AM              *** Final ***                    SHUBHAM OROZCO MD  This document has been electronically signed. Nov 30 2018  7:10AM    LABS                        7.8    22.88 )-----------( 506      ( 02 Dec 2018 11:58 )             24.4     12-03    139  |  103  |  40<H>  ----------------------------<  92  4.3   |  19  |  1.7<H>    Ca    7.6<L>      03 Dec 2018 04:05  Phos  2.5     12-03  Mg     2.1     12-03    TPro  5.1<L>  /  Alb  2.2<L>  /  TBili  0.5  /  DBili  x   /  AST  45<H>  /  ALT  39  /  AlkPhos  107  12-03    CARDIAC MARKERS ( 03 Dec 2018 04:05 )  x     / 2.56 ng/mL / x     / x     / x      CARDIAC MARKERS ( 02 Dec 2018 04:54 )  x     / 3.65 ng/mL / x     / x     / x          Magnesium, Serum: 2.1 mg/dL [1.8 - 2.4] (12-03-18 @ 04:05)  LIVER FUNCTIONS - ( 03 Dec 2018 04:05 )  Alb: 2.2 g/dL / Pro: 5.1 g/dL / ALK PHOS: 107 U/L / ALT: 39 U/L / AST: 45 U/L / GGT: x                 A/P:  I discussed the case with Interventional Cardiologist Dr. Rodrigues & recommends the following:    S/P PCI pLCX and mLAD cobra stent x 2  	         Continue DAPT,(asa 81mg, plavix 75 mg daily) Statin Therapy, BB                    monitor H/H, monitor Cr, trending down                     EF n/l and CE trending down                     R arm ecchymotic and swollen , + pulses, duplex negative, continue warm compresses and elevation                    Pt given instructions on importance of taking antiplatelet medication or risk acute stent thrombosis/death                   Post cath instructions, access site care and activity restrictions reviewed with patient                     Discussed with patient to return to hospital if experience chest pain, shortness breath, dizziness and site bleeding                   Aggressive risk factor modification, diet counseling, smoking cessation discussed with patient                       will need PT/rehab                   pt may downgrade to medicine floor(no need for tele per Dr. Rodrigues)

## 2018-12-03 NOTE — PROGRESS NOTE ADULT - SUBJECTIVE AND OBJECTIVE BOX
LENGTH OF HOSPITAL STAY: 5d    CHIEF COMPLAINT:   Patient is a 78y old  Female who presents with a chief complaint of chest pain. Currently admitted to CCU for NSTEMI, hospital day 5.    Overnight, developed urticarial rash on back similar to prior chronic rash treated with benadryl at home. ID consulted, recommended d/c meropenem and switch to cipro 500 mg PO Q12H. Also complaining of multiple episodes of loose stool. Stool culture sent, c.diff neg, pending       HISTORY OF PRESENTING ILLNESS:   78yFemale with PMH below presented to the hospital for chest pain that radiated up her jaw and into her left arm while she was performing PT. She took two aspirin earlier and her pain resolved. She states that she thought it was from manipulation during her PT but got better with aspirin. She denies similar pain previously. She currently endorses pain only when she takes a deep breath and feels a soreness. Denies shortness of breath.   found to have  ST depressions in V2 and V3 with borderline DOLORES in inferior leads.  	    PAST MEDICAL & SURGICAL HISTORY  PAST MEDICAL & SURGICAL HISTORY:  Arthritis  Mitral valve disorder  Other irritable bowel syndrome  H/O knee surgery  History of hip surgery  Hypertension  No significant past surgical history    SOCIAL HISTORY:    ALLERGIES:  penicillin (Rash; Anaphylaxis; Hives)    MEDICATIONS:  STANDING MEDICATIONS  aspirin  chewable 81 milliGRAM(s) Oral daily  atorvastatin 80 milliGRAM(s) Oral at bedtime  chlorhexidine 4% Liquid 1 Application(s) Topical <User Schedule>  ciprofloxacin     Tablet 500 milliGRAM(s) Oral every 12 hours  clopidogrel Tablet 75 milliGRAM(s) Oral daily  docusate sodium 100 milliGRAM(s) Oral daily  guaiFENesin  milliGRAM(s) Oral every 12 hours  heparin  Injectable 5000 Unit(s) SubCutaneous every 8 hours  metoprolol tartrate 12.5 milliGRAM(s) Oral two times a day    PRN MEDICATIONS  acetaminophen   Tablet .. 650 milliGRAM(s) Oral every 6 hours PRN  bisacodyl Suppository 10 milliGRAM(s) Rectal daily PRN  diphenhydrAMINE 25 milliGRAM(s) Oral every 6 hours PRN    VITALS:   T(F): 98.6  HR: 86  BP: 107/48  RR: 18  SpO2: 98%    LABS:                        7.8    22.88 )-----------( 506      ( 02 Dec 2018 11:58 )             24.4     12    139  |  103  |  40<H>  ----------------------------<  92  4.3   |  19  |  1.7<H>    Ca    7.6<L>      03 Dec 2018 04:05  Phos  2.5       Mg     2.1         TPro  5.1<L>  /  Alb  2.2<L>  /  TBili  0.5  /  DBili  x   /  AST  45<H>  /  ALT  39  /  AlkPhos  107        Urinalysis Basic - ( 02 Dec 2018 09:00 )    Color: Yellow / Appearance: Turbid / S.020 / pH: x  Gluc: x / Ketone: Negative  / Bili: Negative / Urobili: 0.2 mg/dL   Blood: x / Protein: >=300 mg/dL / Nitrite: Negative   Leuk Esterase: Large / RBC: 3-5 /HPF / WBC >50 /HPF   Sq Epi: x / Non Sq Epi: x / Bacteria: Many /HPF        Troponin T, Serum: 2.56 ng/mL <HH> (18 @ 04:05)      CARDIAC MARKERS ( 03 Dec 2018 04:05 )  x     / 2.56 ng/mL / x     / x     / x      CARDIAC MARKERS ( 02 Dec 2018 04:54 )  x     / 3.65 ng/mL / x     / x     / x          RADIOLOGY:    PHYSICAL EXAM:  GEN: No acute distress  HEENT:   LUNGS: Clear to auscultation bilaterally   HEART: S1/S2 present. RRR.   ABD: Soft, non-tender, non-distended. Bowel sounds present  EXT:  NEURO: AAOX3 LENGTH OF HOSPITAL STAY: 5d    CHIEF COMPLAINT:   Patient is a 78y old  Female who presents with a chief complaint of chest pain. Currently admitted to CCU for NSTEMI s/p cath with stents in LAD and LCX on , hospital day 5.    Overnight, developed urticarial rash on back similar to prior chronic rash treated with benadryl at home. ID consulted, recommended d/c meropenem and switch to cipro 500 mg PO Q12H. Also complaining of multiple episodes of loose stool. Stool culture sent, c.diff neg, pending stool culture/cell count. No cp, sob, abd pain.      HISTORY OF PRESENTING ILLNESS:   78y Female with PMH below presented to the hospital for chest pain that radiated up her jaw and into her left arm while she was performing PT. She took two aspirin earlier and her pain resolved. She states that she thought it was from manipulation during her PT but got better with aspirin. She denies similar pain previously. She currently endorses pain only when she takes a deep breath and feels a soreness. Denies shortness of breath.   found to have ST depressions in V2 and V3 with borderline DOLORES in inferior leads.  	    PAST MEDICAL & SURGICAL HISTORY  PAST MEDICAL & SURGICAL HISTORY:  Arthritis  Mitral valve disorder  Other irritable bowel syndrome  H/O knee surgery  History of hip surgery  Hypertension  No significant past surgical history    SOCIAL HISTORY:    ALLERGIES:  penicillin (Rash; Anaphylaxis; Hives)    MEDICATIONS:  STANDING MEDICATIONS  aspirin  chewable 81 milliGRAM(s) Oral daily  atorvastatin 80 milliGRAM(s) Oral at bedtime  chlorhexidine 4% Liquid 1 Application(s) Topical <User Schedule>  ciprofloxacin     Tablet 500 milliGRAM(s) Oral every 12 hours  clopidogrel Tablet 75 milliGRAM(s) Oral daily  docusate sodium 100 milliGRAM(s) Oral daily  guaiFENesin  milliGRAM(s) Oral every 12 hours  heparin  Injectable 5000 Unit(s) SubCutaneous every 8 hours  metoprolol tartrate 12.5 milliGRAM(s) Oral two times a day    PRN MEDICATIONS  acetaminophen   Tablet .. 650 milliGRAM(s) Oral every 6 hours PRN  bisacodyl Suppository 10 milliGRAM(s) Rectal daily PRN  diphenhydrAMINE 25 milliGRAM(s) Oral every 6 hours PRN    VITALS:   T(F): 98.6  HR: 86  BP: 107/48  RR: 18  SpO2: 98%    LABS:                        7.8    22.88 )-----------( 506      ( 02 Dec 2018 11:58 )             24.4     12    139  |  103  |  40<H>  ----------------------------<  92  4.3   |  19  |  1.7<H>    Ca    7.6<L>      03 Dec 2018 04:05  Phos  2.5       Mg     2.1         TPro  5.1<L>  /  Alb  2.2<L>  /  TBili  0.5  /  DBili  x   /  AST  45<H>  /  ALT  39  /  AlkPhos  107        Urinalysis Basic - ( 02 Dec 2018 09:00 )    Color: Yellow / Appearance: Turbid / S.020 / pH: x  Gluc: x / Ketone: Negative  / Bili: Negative / Urobili: 0.2 mg/dL   Blood: x / Protein: >=300 mg/dL / Nitrite: Negative   Leuk Esterase: Large / RBC: 3-5 /HPF / WBC >50 /HPF   Sq Epi: x / Non Sq Epi: x / Bacteria: Many /HPF        Troponin T, Serum: 2.56 ng/mL <HH> (18 @ 04:05)      CARDIAC MARKERS ( 03 Dec 2018 04:05 )  x     / 2.56 ng/mL / x     / x     / x      CARDIAC MARKERS ( 02 Dec 2018 04:54 )  x     / 3.65 ng/mL / x     / x     / x          RADIOLOGY:    PHYSICAL EXAM:  GEN: No acute distress  HEENT:   LUNGS: Clear to auscultation bilaterally   HEART: S1/S2 present. RRR.   ABD: Soft, non-tender, non-distended. Bowel sounds present  EXT: Hematoma to RUE  NEURO: AAOX3 LENGTH OF HOSPITAL STAY: 5d    CHIEF COMPLAINT:   Patient is a 78y old  Female who presents with a chief complaint of chest pain. Currently admitted to CCU for NSTEMI s/p cath with stents in LAD and LCX on , hospital day 5.    Overnight, developed urticarial rash on back similar to prior chronic rash treated with benadryl at home. ID consulted, recommended d/c meropenem and switch to cipro 500 mg PO Q12H. Also complaining of multiple episodes of loose stool. Stool culture sent, c.diff neg, pending stool culture/cell count. No cp, sob, abd pain.      HISTORY OF PRESENTING ILLNESS:   78y Female with PMH below presented to the hospital for chest pain that radiated up her jaw and into her left arm while she was performing PT. She took two aspirin earlier and her pain resolved. She states that she thought it was from manipulation during her PT but got better with aspirin. She denies similar pain previously. She currently endorses pain only when she takes a deep breath and feels a soreness. Denies shortness of breath. In ED found to have ST depressions in V2 and V3 with borderline DOLORES in inferior leads.  	    PAST MEDICAL & SURGICAL HISTORY  PAST MEDICAL & SURGICAL HISTORY:  Arthritis  Mitral valve disorder  Other irritable bowel syndrome  H/O knee surgery  History of hip surgery  Hypertension  No significant past surgical history    SOCIAL HISTORY:    ALLERGIES:  penicillin (Rash; Anaphylaxis; Hives)    MEDICATIONS:  STANDING MEDICATIONS  aspirin  chewable 81 milliGRAM(s) Oral daily  atorvastatin 80 milliGRAM(s) Oral at bedtime  chlorhexidine 4% Liquid 1 Application(s) Topical <User Schedule>  ciprofloxacin     Tablet 500 milliGRAM(s) Oral every 12 hours  clopidogrel Tablet 75 milliGRAM(s) Oral daily  docusate sodium 100 milliGRAM(s) Oral daily  guaiFENesin  milliGRAM(s) Oral every 12 hours  heparin  Injectable 5000 Unit(s) SubCutaneous every 8 hours  metoprolol tartrate 12.5 milliGRAM(s) Oral two times a day    PRN MEDICATIONS  acetaminophen   Tablet .. 650 milliGRAM(s) Oral every 6 hours PRN  bisacodyl Suppository 10 milliGRAM(s) Rectal daily PRN  diphenhydrAMINE 25 milliGRAM(s) Oral every 6 hours PRN    VITALS:   T(F): 98.6  HR: 86  BP: 107/48  RR: 18  SpO2: 98%    LABS:                        7.8    22.88 )-----------( 506      ( 02 Dec 2018 11:58 )             24.4     12    139  |  103  |  40<H>  ----------------------------<  92  4.3   |  19  |  1.7<H>    Ca    7.6<L>      03 Dec 2018 04:05  Phos  2.5       Mg     2.1         TPro  5.1<L>  /  Alb  2.2<L>  /  TBili  0.5  /  DBili  x   /  AST  45<H>  /  ALT  39  /  AlkPhos  107        Urinalysis Basic - ( 02 Dec 2018 09:00 )    Color: Yellow / Appearance: Turbid / S.020 / pH: x  Gluc: x / Ketone: Negative  / Bili: Negative / Urobili: 0.2 mg/dL   Blood: x / Protein: >=300 mg/dL / Nitrite: Negative   Leuk Esterase: Large / RBC: 3-5 /HPF / WBC >50 /HPF   Sq Epi: x / Non Sq Epi: x / Bacteria: Many /HPF        Troponin T, Serum: 2.56 ng/mL <HH> (18 @ 04:05)      CARDIAC MARKERS ( 03 Dec 2018 04:05 )  x     / 2.56 ng/mL / x     / x     / x      CARDIAC MARKERS ( 02 Dec 2018 04:54 )  x     / 3.65 ng/mL / x     / x     / x          RADIOLOGY:    PHYSICAL EXAM:  GEN: No acute distress  HEENT: Moist mucous membranes  LUNGS: Clear to auscultation bilaterally   HEART: S1/S2 present. RRR.   ABD: Soft, non-tender, non-distended. Bowel sounds present  EXT: Hematoma to RUE  NEURO: AAOX3

## 2018-12-03 NOTE — CONSULT NOTE ADULT - ASSESSMENT
IMPRESSION: Rehab of Debilitation sp MI SP 2 Stents/ Recent ORIF Periprostrhetic Femur Fx    PRECAUTIONS: [   x ] Cardiac  [ x   ] Respiratory  [    ] Seizures [    ] Contact Isolation  [    ] Droplet Isolation  [    ] Other    Weight Bearing Status: NWB RLE KEEP IN DEREK    RECOMMENDATION:    Out of Bed to Chair     DVT/Decubiti Prophylaxis    REHAB PLAN:     [x     ] Bedside P/T 3-5 times a week   [     ] Bedside O/T  2-3 times a week   [     ] No Rehab Therapy Indicated   [     ]  Speech Therapy   Conditioning/ROM                                 ADL  Bed Mobility                                            Conditioning/ROM  Transfers                                                  Bed Mobility  Sitting /Standing Balance                      Transfers                                        Gait Training                                            Sitting/Standing Balance  Stair Training [   ]Applicable                 Home equipment Eval                                                                     Splinting  [   ] Only      GOALS:   ADL   [  x  ]   Independent         Transfers  [  x  ] Independent            Ambulation  [ x    ] Independent     [    x ] With device                            [    ]  CG                                               [    ]  CG                                                    [     ] CG                            [    ] Min A                                          [    ] Min A                                                [     ] Min  A          DISCHARGE PLAN:   [     ]  Good candidate for Intensive Rehabilitation/Hospital based                                             Will tolerate 3hrs Intensive Rehab Daily                                       [    x  ]  Short Term Rehab in Skilled Nursing Facility                                       [      ]  Home with Outpatient or  services                                         [      ]  Possible Candidate for Intensive Hospital based Rehab
Missed STEMI  - pain started at 10 AM yesterday and not brought into ER until 2AM from [facility]  - DAPT  - admit to CCU  - urgent cath today- keep NPO  - 2d echo  - lipitor 80 mg QHS  - continue antihypertensive medications  - heparin gtt  - if pain re-occurs, call cardiology fellow and plan for emergent re-vascularization  - urgent orthopedics evaluation for pre-cath evaluation as patient recently had ORIF to R hip.
78y Female with PMH below presented to the hospital for typical chest pain. recent h/o surgery for right femur on 11/22/18.  ID called as pt had positive urine cultures and a possible rsh secondary to Meropenem and has a histroy of PCN allergies.    IMPRESSION:  No evidence of pyelonephritis secondary to polymicrobial jo ann.  Leucocytosis secondary to NSTEMI in all probability.  Will nevertherless cover  Rash is not significant and is not related to the Meropenem    RECOMMENDATIONS:  Ciprofloxacin 500 mg po q12h  D/c meropenem

## 2018-12-03 NOTE — PROGRESS NOTE ADULT - SUBJECTIVE AND OBJECTIVE BOX
AGUSTÍN TURCIOS  78y Female    INTERVAL HPI/OVERNIGHT EVENTS:    Pt feels OK. No chest pain or SOB. Edwards placed for urinary retention seen on bladder US.   Edwards with cloudy urine. Mild chronic cough - on Mucinex.  Right forearm with hematoma.    T(F): 97.6 (18 @ 04:00), Max: 100.5 (18 @ 20:00)  HR: 84 (18 @ 06:00) (84 - 110)  BP: 113/47 (18 @ 06:00) (92/49 - 127/55)  RR: 18 (18 @ 06:00) (15 - 31)  SpO2: 97% (18 @ 06:00) (93% - 99%) on RA    I&O's Summary    02 Dec 2018 07:01  -  03 Dec 2018 07:00  --------------------------------------------------------  IN: 1010 mL / OUT: 2930 mL / NET: -1920 mL        Daily     Daily Weight in k (03 Dec 2018 06:00)    PHYSICAL EXAM:  GENERAL: NAD  HEAD:  Normocephalic  EYES:  conjunctiva and sclera clear  ENMT: Moist mucous membranes  NECK: Supple, No JVD  NERVOUS SYSTEM:  Alert & Oriented X3, Good concentration  CHEST/LUNG: Clear to percussion bilaterally with decreased BS at left base  HEART: Regular rate and rhythm  ABDOMEN: Soft, Nontender, Nondistended; Bowel sounds present  EXTREMITIES: Right forearm with edema and hematoma  Right LE in immobilizer, mild edema  SKIN: ecchymoses  : Edwards    Consultant(s) Notes Reviewed:  [x ] YES  [ ] NO  Care Discussed with Consultants/Other Providers [ x] YES  [ ] NO    MEDICATIONS  (STANDING):  aspirin  chewable 81 milliGRAM(s) Oral daily  atorvastatin 80 milliGRAM(s) Oral at bedtime  chlorhexidine 4% Liquid 1 Application(s) Topical <User Schedule>  ciprofloxacin     Tablet 500 milliGRAM(s) Oral every 12 hours  clopidogrel Tablet 75 milliGRAM(s) Oral daily  docusate sodium 100 milliGRAM(s) Oral daily  guaiFENesin  milliGRAM(s) Oral every 12 hours  heparin  Injectable 5000 Unit(s) SubCutaneous every 8 hours  metoprolol tartrate 12.5 milliGRAM(s) Oral two times a day    MEDICATIONS  (PRN):  acetaminophen   Tablet .. 650 milliGRAM(s) Oral every 6 hours PRN Temp greater or equal to 38C (100.4F)  bisacodyl Suppository 10 milliGRAM(s) Rectal daily PRN Constipation  diphenhydrAMINE 25 milliGRAM(s) Oral every 6 hours PRN Rash and/or Itching    Telemetry reviewed    LABS:                        7.8    22.88 )-----------( 506      ( 02 Dec 2018 11:58 )             24.4     12    139  |  103  |  40<H>  ----------------------------<  92  4.3   |  19  |  1.7<H>    Ca    7.6<L>      03 Dec 2018 04:05  Phos  2.5       Mg     2.1         TPro  5.1<L>  /  Alb  2.2<L>  /  TBili  0.5  /  DBili  x   /  AST  45<H>  /  ALT  39  /  AlkPhos  107  12      CARDIAC MARKERS ( 03 Dec 2018 04:05 )  x     / 2.56 ng/mL / x     / x     / x      CARDIAC MARKERS ( 02 Dec 2018 04:54 )  x     / 3.65 ng/mL / x     / x     / x          Culture - Urine (11.26.18 @ 09:08)    -  Amikacin: S 16    -  Amikacin: S <=8    -  Amoxicillin/Clavulanic Acid: S <=8/4    -  Amoxicillin/Clavulanic Acid: S <=8/4    -  Ampicillin: S <=2 These ampicillin results predict results for amoxicillin    -  Ampicillin: R >16 These ampicillin results predict results for amoxicillin    -  Ampicillin/Sulbactam: S <=4/2    -  Ampicillin/Sulbactam: I 16/8    -  Aztreonam: S <=4    -  Aztreonam: S <=4    -  Cefazolin: S <=2 For uncomplicated UTI with K. pneumoniae, E. coli, or P. mirablis: MANUEL <=16 is sensitive and MANUEL >=32 is resistant. This also predicts results for oral agents cefaclor, cefdinir, cefpodoxime, cefprozil, cefuroxime axetil, cephalexin and locarbef for uncomplicated UTI. Note that some isolates may be susceptible to these agents while testing resistant to cefazolin.    -  Cefazolin: S <=2 For uncomplicated UTI with K. pneumoniae, E. coli, or P. mirablis: MANUEL <=16 is sensitive and MANUEL >=32 is resistant. This also predicts results for oral agents cefaclor, cefdinir, cefpodoxime, cefprozil, cefuroxime axetil, cephalexin and locarbef for uncomplicated UTI. Note that some isolates may be susceptible to these agents while testing resistant to cefazolin.    -  Cefepime: S <=2    -  Cefepime: S <=2    -  Cefoxitin: S <=4    -  Cefoxitin: S <=4    -  Ceftriaxone: S <=1 Enterobacter, Citrobacter, and Serratia may develop resistance during prolonged therapy    -  Ceftriaxone: S <=1 Enterobacter, Citrobacter, and Serratia may develop resistance during prolonged therapy    -  Ciprofloxacin: S <=0.5    -  Ciprofloxacin: S <=0.5    -  Ertapenem: S <=0.5    -  Ertapenem: S <=0.5    -  Gentamicin: S <=1    -  Gentamicin: S <=1    -  Imipenem: S <=1    -  Levofloxacin: S <=1    -  Levofloxacin: S <=1    -  Meropenem: S <=1    -  Meropenem: S <=1    -  Nitrofurantoin: R >64 Should not be used to treat pyelonephritis    -  Nitrofurantoin: S <=32 Should not be used to treat pyelonephritis    -  Piperacillin/Tazobactam: S <=8    -  Piperacillin/Tazobactam: S <=8    -  Tigecycline: S <=1    -  Tobramycin: S <=2    -  Tobramycin: S <=2    -  Trimethoprim/Sulfamethoxazole: S <=0.5/9.5    -  Trimethoprim/Sulfamethoxazole: R >2/38    Specimen Source: .Urine Catheterized    Culture Results:   50,000 - 99,000 CFU/mL Proteus mirabilis  >100,000 CFU/ml Escherichia coli    Organism Identification: Proteus mirabilis  Escherichia coli    Organism: Proteus mirabilis    Organism: Escherichia coli    Method Type: MANUEL    Urine Microscopic-Add On (NC) (18 @ 09:00)    Red Blood Cell - Urine: 3-5 /HPF    White Blood Cell - Urine: >50 /HPF    Bacteria: Many /HPF      Method Type: MANUEL    Urinalysis (18 @ 09:00)    Glucose Qualitative, Urine: Negative mg/dL    Blood, Urine: Large    pH Urine: 7.5    Color: Yellow    Urine Appearance: Turbid    Bilirubin: Negative    Ketone - Urine: Negative    Specific Gravity: 1.020    Protein, Urine: >=300 mg/dL    Urobilinogen: 0.2 mg/dL    Nitrite: Negative    Leukocyte Esterase Concentration: Large        RADIOLOGY & ADDITIONAL TESTS:    Imaging or report Personally Reviewed:  [ x] YES  [ ] NO    < from: Xray Chest 1 View- PORTABLE-Urgent (18 @ 06:45) >  Impression:      Increasing bibasilar opacities. Follow to resolution is recommended    < end of copied text >      Case discussed with resident    Care discussed with pt

## 2018-12-04 DIAGNOSIS — S72.91XA UNSPECIFIED FRACTURE OF RIGHT FEMUR, INITIAL ENCOUNTER FOR CLOSED FRACTURE: ICD-10-CM

## 2018-12-04 LAB
ANION GAP SERPL CALC-SCNC: 16 MMOL/L — HIGH (ref 7–14)
BUN SERPL-MCNC: 31 MG/DL — HIGH (ref 10–20)
CALCIUM SERPL-MCNC: 8.3 MG/DL — LOW (ref 8.5–10.1)
CHLORIDE SERPL-SCNC: 98 MMOL/L — SIGNIFICANT CHANGE UP (ref 98–110)
CO2 SERPL-SCNC: 20 MMOL/L — SIGNIFICANT CHANGE UP (ref 17–32)
CREAT SERPL-MCNC: 1.6 MG/DL — HIGH (ref 0.7–1.5)
CULTURE RESULTS: SIGNIFICANT CHANGE UP
GLUCOSE SERPL-MCNC: 121 MG/DL — HIGH (ref 70–99)
HCT VFR BLD CALC: 25.9 % — LOW (ref 37–47)
HGB BLD-MCNC: 8.2 G/DL — LOW (ref 12–16)
MAGNESIUM SERPL-MCNC: 2 MG/DL — SIGNIFICANT CHANGE UP (ref 1.8–2.4)
MCHC RBC-ENTMCNC: 26.9 PG — LOW (ref 27–31)
MCHC RBC-ENTMCNC: 31.7 G/DL — LOW (ref 32–37)
MCV RBC AUTO: 84.9 FL — SIGNIFICANT CHANGE UP (ref 81–99)
NRBC # BLD: 0 /100 WBCS — SIGNIFICANT CHANGE UP (ref 0–0)
PLATELET # BLD AUTO: 636 K/UL — HIGH (ref 130–400)
POTASSIUM SERPL-MCNC: 4.8 MMOL/L — SIGNIFICANT CHANGE UP (ref 3.5–5)
POTASSIUM SERPL-SCNC: 4.8 MMOL/L — SIGNIFICANT CHANGE UP (ref 3.5–5)
RBC # BLD: 3.05 M/UL — LOW (ref 4.2–5.4)
RBC # FLD: 20 % — HIGH (ref 11.5–14.5)
SODIUM SERPL-SCNC: 134 MMOL/L — LOW (ref 135–146)
SPECIMEN SOURCE: SIGNIFICANT CHANGE UP
WBC # BLD: 17.58 K/UL — HIGH (ref 4.8–10.8)
WBC # FLD AUTO: 17.58 K/UL — HIGH (ref 4.8–10.8)

## 2018-12-04 RX ADMIN — Medication 12.5 MILLIGRAM(S): at 06:15

## 2018-12-04 RX ADMIN — Medication 600 MILLIGRAM(S): at 06:15

## 2018-12-04 RX ADMIN — Medication 600 MILLIGRAM(S): at 17:15

## 2018-12-04 RX ADMIN — Medication 81 MILLIGRAM(S): at 12:17

## 2018-12-04 RX ADMIN — Medication 1 APPLICATION(S): at 17:15

## 2018-12-04 RX ADMIN — Medication 100 MILLIGRAM(S): at 12:17

## 2018-12-04 RX ADMIN — HEPARIN SODIUM 5000 UNIT(S): 5000 INJECTION INTRAVENOUS; SUBCUTANEOUS at 21:09

## 2018-12-04 RX ADMIN — Medication 500 MILLIGRAM(S): at 17:15

## 2018-12-04 RX ADMIN — HEPARIN SODIUM 5000 UNIT(S): 5000 INJECTION INTRAVENOUS; SUBCUTANEOUS at 12:18

## 2018-12-04 RX ADMIN — ATORVASTATIN CALCIUM 80 MILLIGRAM(S): 80 TABLET, FILM COATED ORAL at 21:08

## 2018-12-04 RX ADMIN — Medication 12.5 MILLIGRAM(S): at 17:15

## 2018-12-04 RX ADMIN — Medication 500 MILLIGRAM(S): at 06:16

## 2018-12-04 RX ADMIN — HEPARIN SODIUM 5000 UNIT(S): 5000 INJECTION INTRAVENOUS; SUBCUTANEOUS at 06:16

## 2018-12-04 RX ADMIN — CLOPIDOGREL BISULFATE 75 MILLIGRAM(S): 75 TABLET, FILM COATED ORAL at 12:17

## 2018-12-04 NOTE — PROGRESS NOTE ADULT - SUBJECTIVE AND OBJECTIVE BOX
AGUSTÍN TURCIOS  78y Female    INTERVAL HPI/OVERNIGHT EVENTS:    Pt now on the medical floor. She feels OK except for the right arm swelling/hematoma.   No fever. For voiding trial today.   No chest pain or SOB. Had small BM yesterday.    T(F): 98 (12-04-18 @ 06:25), Max: 98.7 (12-03-18 @ 12:00)  HR: 100 (12-04-18 @ 06:25) (100 - 106)  BP: 119/58 (12-04-18 @ 06:25) (100/46 - 125/60)  RR: 20 (12-03-18 @ 21:01) (20 - 23)  SpO2: 96% (12-04-18 @ 00:44) (96% - 99%) on RA    I&O's Summary    03 Dec 2018 07:01  -  04 Dec 2018 07:00  --------------------------------------------------------  IN: 240 mL / OUT: 1475 mL / NET: -1235 mL    04 Dec 2018 07:01  -  04 Dec 2018 10:45  --------------------------------------------------------  IN: 200 mL / OUT: 0 mL / NET: 200 mL      PHYSICAL EXAM:  GENERAL: NAD  HEAD:  Normocephalic  EYES:  conjunctiva and sclera clear  ENMT: Moist mucous membranes  NECK: Supple  NERVOUS SYSTEM:  Alert & Oriented X3, Good concentration  CHEST/LUNG: Clear to percussion bilaterally  HEART: Regular rate and rhythm  ABDOMEN: Soft, mildly tender in lower quadrants (possibly related to heparin SQ injections), Nondistended; Bowel sounds present  EXTREMITIES:   Right forearm with hematoma and swelling (now outlined), soft, tenderness to touch, no drainage  right radial pulse 2+ and pt with good cap refill  Right LE with immobilizer  mild LE edema  Left ankle with crusted lesion (will be followed by Dr. Ochoa for biopsy)  SKIN: pale  ecchymoses     Consultant(s) Notes Reviewed:  [x ] YES  [ ] NO  Care Discussed with Consultants/Other Providers [ x] YES  [ ] NO    MEDICATIONS  (STANDING):  aspirin  chewable 81 milliGRAM(s) Oral daily  atorvastatin 80 milliGRAM(s) Oral at bedtime  BACItracin   Ointment 1 Application(s) Topical daily  chlorhexidine 4% Liquid 1 Application(s) Topical <User Schedule>  ciprofloxacin     Tablet 500 milliGRAM(s) Oral every 12 hours  clopidogrel Tablet 75 milliGRAM(s) Oral daily  docusate sodium 100 milliGRAM(s) Oral daily  guaiFENesin  milliGRAM(s) Oral every 12 hours  heparin  Injectable 5000 Unit(s) SubCutaneous every 8 hours  metoprolol tartrate 12.5 milliGRAM(s) Oral two times a day    MEDICATIONS  (PRN):  acetaminophen   Tablet .. 650 milliGRAM(s) Oral every 6 hours PRN Temp greater or equal to 38C (100.4F)  bisacodyl Suppository 10 milliGRAM(s) Rectal daily PRN Constipation  diphenhydrAMINE 25 milliGRAM(s) Oral every 6 hours PRN Rash and/or Itching      LABS:                        7.8    22.88 )-----------( 506      ( 02 Dec 2018 11:58 )             24.4     12-03    139  |  103  |  40<H>  ----------------------------<  92  4.3   |  19  |  1.7<H>    Ca    7.6<L>      03 Dec 2018 04:05  Phos  2.5     12-03  Mg     2.1     12-03    TPro  5.1<L>  /  Alb  2.2<L>  /  TBili  0.5  /  DBili  x   /  AST  45<H>  /  ALT  39  /  AlkPhos  107  12-03        Culture - Stool (collected 02 Dec 2018 21:35)  Source: .Stool Feces  Preliminary Report (03 Dec 2018 20:04):    No enteric pathogens to date: Final culture pending    Culture - Urine (collected 02 Dec 2018 09:00)  Source: .Urine Clean Catch (Midstream)  Final Report (03 Dec 2018 21:13):    Culture grew 3 or more types of organisms which indicate    collection contamination; consider recollection only if clinically    indicated.    Culture - Blood (collected 02 Dec 2018 00:53)  Source: .Blood None  Preliminary Report (03 Dec 2018 13:02):    No growth to date.    Occult Blood, Feces Multiple Specimen (12.02.18 @ 21:56)    Occult Blood, Feces Multiple Specimen: Negative        RADIOLOGY & ADDITIONAL TESTS:    Imaging or report Personally Reviewed:  [ x] YES  [ ] NO    < from: US Kidney and Bladder (12.02.18 @ 14:29) >    Impression:    No hydronephrosis or stone in either kidney.    Bilateral renal cysts, unchanged.    Increased bilateral renal cortical echogenicity, which can represent   renal parenchymal disease. Correlate with labs.    Post void urinary bladder residual 750 cc.    Wall thickening of the urinary bladder and layering debris. Correlate   with urinalysis for infection. Differential includes chronic outlet   obstruction and cystitis/infection.    Small pelvic ascites.      < end of copied text >    < from: [facility] Duplex Upper Ext Vein Scan, Right (11.30.18 @ 21:59) >  Impression:    No evidence of deep or superficial venous thrombosis in the right upper   extremity.    < end of copied text >      Case discussed with resident    Care discussed with pt

## 2018-12-04 NOTE — PROGRESS NOTE ADULT - ASSESSMENT
77 y/o woman with PMH of ORIF for right periprosthetic LE fracture on 11/22/18, arthritis, IBS (diarrhea predominant) and HTN presented with chest pain while at STR at [facility].   She was diagnosed with NSTEMI and underwent PCI with stents to LAD and LCx. Hospital course complicated by acute anemia, orthostatic hypotension, ESTEFANY, urinary retention, fever, UTI and rash.    1. NSTEMI s/p stents to LAD and LCx  cardiac enzymes trendeddown  on ASA/Plavix/metoprolol/statin  cardio f/u appreciated  downgraded to medical floor    2. Fever - now resolved  ID consult appreciated - on Cipro to cover for urinary source of fever  f/u all cultures and WBC count  repeat CXR - pt without symptoms of pneumonia  monitor temps    3. Anemia (acute over chronic)  s/p transfusion over the weekend and Hgb improved   stool guaiac negative  Ortho f/u appreciated - not due to right LE wound  monitor H/H - check CBC today and in AM    4. ESTEFANY - creatinine now improving after Edwards was placed for urinary retention  monitor urine outpt and BMP  voiding trial being done for today  pt needs OOB to chair daily    5. Right UE hematoma/edema - venous duplex negative  elevate and warm compress and monitor size  check CBC  if progressive, then check US of right UE    6. s/p recent ORIF for right periprosthetic LE fracture  rehab consult appreciated - for STR at SNF when medically stable    7. Rash - chronic and intermittent per pt - better on benadryl  occurred before meropenem so doubt drug rash    8. DVT prophylaxis    9. Prognosis- guarded

## 2018-12-04 NOTE — PROGRESS NOTE ADULT - ASSESSMENT
77 y/o woman with PMH of ORIF for right periprosthetic LE fracture on 11/22/18, arthritis, IBS (diarrhea predominant) and HTN presented with chest pain while at STR at [facility].   She was diagnosed with NSTEMI and underwent PCI with stents to LAD and LCx. Hospital course complicated by acute anemia, orthostatic hypotension, ESTEFANY, urinary retention, fever, UTI and rash.    # NSTEMI s/p stents to LAD and LCx  -Stable   cardiac enzymes trended down  on ASA/Plavix/metoprolol/statin  cardio f/u  outpatient. DAPT.     # Fever - now resolved  ID consult appreciated - on Cipro to cover for urinary source of fever  f/u all cultures and WBC count  repeat CXR - pt without symptoms of pneumonia  monitor temps    # Anemia (acute over chronic)  s/p transfusion over the weekend and Hgb improved   stool guaiac negative  Ortho f/u appreciated - not due to right LE wound  monitor H/H - check CBC today and in AM    # ESTEFANY - creatinine now improving after Edwards was placed for urinary retention  monitor urine outpt and BMP  voiding trial being done for today  pt needs OOB to chair daily    # Right UE hematoma/edema - venous duplex negative  elevate and warm compress and monitor size  check CBC  if progressive, then check US of right UE    # s/p recent ORIF for right periprosthetic LE fracture  rehab consult appreciated - for STR at [facility] when medically stable    # Rash - chronic and intermittent per pt - better on benadryl  occurred before meropenem so doubt drug rash    # DVT prophylaxis  # Dispo - Back to [facility] once updated PT note available

## 2018-12-04 NOTE — PROGRESS NOTE ADULT - SUBJECTIVE AND OBJECTIVE BOX
DAILY PROGRESS NOTE  ===========================================================  Patient Information:   Hospital Day:</AGUSTÍN TURCIOS  /  78y  /  Female  /b> 6d /  MRN#: [id number]  Working / Admitting Diagnosis:  1. STEMI    |:::::::::::::::::::::::::::::| SUBJECTIVE |:::::::::::::::::::::::::::::::|  OVERNIGHT EVENTS:   No acute events noted.  Denies chest pain / SOB / palpitations / Nausea / Vomitting / constipation / diarrhea or abdominal pain.   ROS otherwise negative.    Past Medical History:   STEMI  Arthritis  Other irritable bowel syndrome  H/O knee surgery  History of hip surgery  Hypertension    ALLERGIES:  penicillin (Rash; Anaphylaxis; Hives)    HOME MEDICATIONS:  Aspir 81 oral delayed release tablet: 1 tab(s) orally once a day (19 Nov 2018 15:42)  atenolol-chlorthalidone 50 mg-25 mg oral tablet: 1 tab(s) orally once a day (19 Nov 2018 15:42)  docusate sodium 100 mg oral capsule: 1 cap(s) orally 3 times a day (26 Nov 2018 15:19)  enoxaparin: 40 milligram(s) subcutaneous once a day (26 Nov 2018 15:19)  losartan-hydrochlorothiazide 50mg-12.5mg oral tablet: 1 tab(s) orally once a day (19 Nov 2018 15:42)  nitrofurantoin macrocrystals-monohydrate 100 mg oral capsule: 1 cap(s) orally 2 times a day (with meals) (26 Nov 2018 15:19)  oxyCODONE-acetaminophen 5 mg-325 mg oral tablet: 1 tab(s) orally every 4 hours, As needed, Severe Pain (7 - 10) (26 Nov 2018 15:19)      |:::::::::::::::::::::::::::| OBJECTIVE |:::::::::::::::::::::::::::|    VITAL SIGNS: Last 24 Hours  T(C): 36 (04 Dec 2018 13:48), Max: 36.7 (04 Dec 2018 06:25)  T(F): 96.8 (04 Dec 2018 13:48), Max: 98 (04 Dec 2018 06:25)  HR: 104 (04 Dec 2018 13:48) (100 - 106)  BP: 116/56 (04 Dec 2018 13:48) (116/56 - 125/60)  RR: 20 (04 Dec 2018 13:48) (20 - 20)  SpO2: 96% (04 Dec 2018 00:44) (96% - 96%)    12-03-18 @ 07:01  -  12-04-18 @ 07:00  --------------------------------------------------------  IN: 240 mL / OUT: 1475 mL / NET: -1235 mL    12-04-18 @ 07:01  -  12-04-18 @ 15:47  --------------------------------------------------------  IN: 440 mL / OUT: 0 mL / NET: 440 mL      PHYSICAL EXAM:  GENERAL:   Awake, alert; NAD.  HEENT:  Head NC/AT; Conjunctivae pink, Sclera anicteric & non-injected; Oral mucosa moist.  NECK:   Supple.  CARDIO:   RRR; S1 & S2 audible  RESP:  No respiratory distress or accessory muscle use. CTAB  GI:   Soft/ NT/ND / No guarding; No rebound tenderness.  EXT:   Without any cyanosis, clubbing, rash, lesions or edema.     LAB RESULTS:                        8.2    17.58 )-----------( 636      ( 04 Dec 2018 11:39 )             25.9     12-04    134<L>  |  98  |  31<H>  ----------------------------<  121<H>  4.8   |  20  |  1.6<H>    Ca    8.3<L>      04 Dec 2018 11:39  Phos  2.5     12-03  Mg     2.0     12-04    TPro  5.1<L>  /  Alb  2.2<L>  /  TBili  0.5  /  DBili  x   /  AST  45<H>  /  ALT  39  /  AlkPhos  107  12-03    CARDIAC MARKERS ( 03 Dec 2018 04:05 )  x     / 2.56 ng/mL / x     / x     / x          MICROBIOLOGY:    Culture - Stool (collected 02 Dec 2018 21:35)  Source: .Stool Feces  Preliminary Report (03 Dec 2018 20:04):    No enteric pathogens to date: Final culture pending    Culture - Urine (collected 02 Dec 2018 09:00)  Source: .Urine Clean Catch (Midstream)  Final Report (03 Dec 2018 21:13):    Culture grew 3 or more types of organisms which indicate    collection contamination; consider recollection only if clinically    indicated.    Culture - Blood (collected 02 Dec 2018 00:53)  Source: .Blood None  Preliminary Report (03 Dec 2018 13:02):    No growth to date.    INPATIENT MEDICATIONS:  aspirin  chewable 81 milliGRAM(s) Oral daily  atorvastatin 80 milliGRAM(s) Oral at bedtime  BACItracin   Ointment 1 Application(s) Topical daily  chlorhexidine 4% Liquid 1 Application(s) Topical <User Schedule>  ciprofloxacin     Tablet 500 milliGRAM(s) Oral every 12 hours  clopidogrel Tablet 75 milliGRAM(s) Oral daily  docusate sodium 100 milliGRAM(s) Oral daily  guaiFENesin  milliGRAM(s) Oral every 12 hours  heparin  Injectable 5000 Unit(s) SubCutaneous every 8 hours  metoprolol tartrate 12.5 milliGRAM(s) Oral two times a day    PRN MEDICATIONS  acetaminophen   Tablet .. 650 milliGRAM(s) Oral every 6 hours PRN  bisacodyl Suppository 10 milliGRAM(s) Rectal daily PRN  diphenhydrAMINE 25 milliGRAM(s) Oral every 6 hours PRN    ------------------------------------------------------------------------------------------------------------

## 2018-12-04 NOTE — PROGRESS NOTE ADULT - ASSESSMENT
IMPRESSION:  No evidence of pyelonephritis secondary to polymicrobial jo ann.  Leucocytosis secondary to NSTEMI in all probability.  Will nevertherless cover  Rash is not significant and is not related to the Meropenem    RECOMMENDATIONS:  Ciprofloxacin 500 mg po q12h  See no need for the brennan catheter

## 2018-12-04 NOTE — PROGRESS NOTE ADULT - SUBJECTIVE AND OBJECTIVE BOX
AGUSTÍN TURCIOS  78y, Female      OVERNIGHT EVENTS:    brennan in, has no complaints    VITALS:  T(F): 98, Max: 98.7 (18 @ 12:00)  HR: 100  BP: 119/58  RR: 20Vital Signs Last 24 Hrs  T(C): 36.7 (04 Dec 2018 06:25), Max: 37.1 (03 Dec 2018 12:00)  T(F): 98 (04 Dec 2018 06:25), Max: 98.7 (03 Dec 2018 12:00)  HR: 100 (04 Dec 2018 06:25) (86 - 106)  BP: 119/58 (04 Dec 2018 06:25) (96/47 - 125/60)  BP(mean): 66 (03 Dec 2018 14:00) (61 - 66)  RR: 20 (03 Dec 2018 21:01) (18 - 23)  SpO2: 96% (04 Dec 2018 00:44) (96% - 99%)    TESTS & MEASUREMENTS:                        7.8    22.88 )-----------( 506      ( 02 Dec 2018 11:58 )             24.4         139  |  103  |  40<H>  ----------------------------<  92  4.3   |  19  |  1.7<H>    Ca    7.6<L>      03 Dec 2018 04:05  Phos  2.5       Mg     2.1         TPro  5.1<L>  /  Alb  2.2<L>  /  TBili  0.5  /  DBili  x   /  AST  45<H>  /  ALT  39  /  AlkPhos  107      LIVER FUNCTIONS - ( 03 Dec 2018 04:05 )  Alb: 2.2 g/dL / Pro: 5.1 g/dL / ALK PHOS: 107 U/L / ALT: 39 U/L / AST: 45 U/L / GGT: x             Culture - Stool (collected 18 @ 21:35)  Source: .Stool Feces  Preliminary Report (18 @ 20:04):    No enteric pathogens to date: Final culture pending    Culture - Urine (collected 18 @ 09:00)  Source: .Urine Clean Catch (Midstream)  Final Report (18 @ 21:13):    Culture grew 3 or more types of organisms which indicate    collection contamination; consider recollection only if clinically    indicated.    Culture - Blood (collected 18 @ 00:53)  Source: .Blood None  Preliminary Report (18 @ 13:02):    No growth to date.      Urinalysis Basic - ( 02 Dec 2018 09:00 )    Color: Yellow / Appearance: Turbid / S.020 / pH: x  Gluc: x / Ketone: Negative  / Bili: Negative / Urobili: 0.2 mg/dL   Blood: x / Protein: >=300 mg/dL / Nitrite: Negative   Leuk Esterase: Large / RBC: 3-5 /HPF / WBC >50 /HPF   Sq Epi: x / Non Sq Epi: x / Bacteria: Many /HPF          RADIOLOGY & ADDITIONAL TESTS:    ANTIBIOTICS:  ciprofloxacin     Tablet 500 milliGRAM(s) Oral every 12 hours

## 2018-12-04 NOTE — PROGRESS NOTE ADULT - SUBJECTIVE AND OBJECTIVE BOX
Cardiology Follow up    AGUSTÍN TURCIOS   78yFemale  PAST MEDICAL & SURGICAL HISTORY:  Arthritis  Mitral valve disorder  Other irritable bowel syndrome  H/O knee surgery  History of hip surgery  Hypertension  No significant past surgical history    Allergies    penicillin (Rash; Anaphylaxis; Hives)    Intolerances        Patient without complaints.   Denies CP, SOB, palpitations, or dizziness      Vital Signs Last 24 Hrs  T(C): 36.7 (04 Dec 2018 06:25), Max: 37.1 (03 Dec 2018 12:00)  T(F): 98 (04 Dec 2018 06:25), Max: 98.7 (03 Dec 2018 12:00)  HR: 100 (04 Dec 2018 06:25) (100 - 106)  BP: 119/58 (04 Dec 2018 06:25) (100/46 - 125/60)  BP(mean): 66 (03 Dec 2018 14:00) (61 - 66)  RR: 20 (03 Dec 2018 21:01) (20 - 23)  SpO2: 96% (04 Dec 2018 00:44) (96% - 99%)Allergies    REVIEW OF SYSTEMS:    CONSTITUTIONAL: No weakness, fevers or chills  EYES/ENT: No visual changes;  No vertigo or throat pain   NECK: No pain or stiffness  RESPIRATORY: No cough, wheezing, hemoptysis; No shortness of breath  CARDIOVASCULAR: No chest pain or palpitations  GASTROINTESTINAL: No abdominal or epigastric pain. No nausea, vomiting, or hematemesis; No diarrhea or constipation. No melena or hematochezia.  GENITOURINARY: No dysuria, frequency or hematuria  NEUROLOGICAL: No numbness or weakness  SKIN: No itching, rashes        NAD, appears well  S1S2, no murmurs, no JVD  CTA B/L, no wheeze, no rales  SNT +BS  Ext:    Right Groin:  NO hematoma or bleeding NO bruit, C/D/I, + pulses   	   Right  Radial : NO   hematoma or  bleeding,; C/D/I  , R FA/bicep area edematous and ecchymotic , + pulses, mvmt, sensation, warmth , + refill     Pulses:  +Rad/ +PTs /+DPs/                                                                                                                       2D ECHO   EXAM:  2-D ECHO (TTE) COMPLETE        PROCEDURE DATE:  11/30/2018      INTERPRETATION:  REPORT:    TRANSTHORACIC ECHOCARDIOGRAM REPORT         Patient Name:   AGUSTÍN TURCIOS Accession #: [id number]  Medical Rec #:  [id number]         Height:      68.0 in 172.7 cm  YOB: 1940        Weight:      188.0 lb 85.28 kg  Patient Age:    78 years         BSA:         1.99 m²  Patient Gender: F                BP:          121/54 mmHg       Date of Exam:        11/30/2018 7:10:05 AM  Referring Physician: [id number] ALL LARRY  Sonographer:         Vinicius Montes  Reading Physician:   Shubham Orozco M.D.    Procedure:   2D Echo/Doppler/Color Doppler Complete.  Indications: R07.9 - Chest Pain, unspecified  Diagnosis:   Chest Pain, unspecified         Summary:   1. Left ventricular ejection fraction, by visual estimation, is 50 to   55%.   2. Normal global left ventricular systolic function.   3. Moderate tricuspid regurgitation.   4. PSAP at least 55.   5. Trace pulmonic valve regurgitation.    PHYSICIAN INTERPRETATION:  Left Ventricle: The left ventricular internal cavity size is normal. Left   ventricular wall thickness is normal. Global LV systolic function was   normal. Left ventricular ejection fraction, by visual estimation, is 50  to 55%.  Right Ventricle: Normal right ventricular size and function.  Left Atrium: Mild to moderately enlarged left atrium.  Right Atrium: Mildly enlarged right atrium.  Pericardium: There is no evidence of pericardial effusion.  Mitral Valve: Trace mitral valve regurgitation is seen. The mitral valve   is normal in structure.  Tricuspid Valve: Moderate tricuspid regurgitation is visualized. PSAP at   least 55.  Aortic Valve: No evidence of aortic valve regurgitation is seen. The   aortic valve is trileaflet. No evidence of aortic stenosis.  Pulmonic Valve: Trace pulmonic valve regurgitation.  Aorta: The aortic root is normal in size and structure.       2D AND M-MODE MEASUREMENTS (normal ranges within parentheses):  Left                 Normal    Aorta/Left           Normal  Ventricle:                     Atrium:  IVSd (2D):  1.04 cm  (0.7-1.1) AoV Cusp      1.98   (1.5-2.6)  LVPWd (2D): 0.96 cm  (0.7-1.1) Separation:   cm  LVIDd (2D): 5.24 cm  (3.4-5.7) Left Atrium   4.48   (1.9-4.0)  LVIDs (2D): 2.93 cm            (Mmode):      cm  LV FS (2D): 44.0 %   (>25%)    LA Volume     28.2  Relative    0.37     (<0.42)   Index         ml/m²  Wall  Thickness    SPECTRAL DOPPLER ANALYSIS:  LV DIASTOLIC FUNCTION:  MV Peak E: 0.75 m/s Decel Time: 239 msec  MV Peak A: 0.64 m/s  E/A Ratio: 1.18    Aortic Valve:  AoV VMax:    1.85 m/s  AoV Area, Vmax: 2.16 cm² Vmax Indx: 1.08 cm²/m²  AoV Pk Grad: 13.7 mmHg    LVOT Vmax: 1.33 m/s  LVOT VTI:  0.24 m  LVOT Diam: 1.96 cm    Mitral Valve:  MV P1/2 Time: 69.22 msec  MV Area, PHT: 3.18 cm²    Tricuspid Valve and PA/RV Systolic Pressure: TR Max Velocity: 3.63 m/s RA   Pressure:  RVSP/PASP: 55.7 mmHg    Pulmonic Valve:  PV Max Velocity: 1.60 m/s PV Max PG: 10.2 mmHg PV Mean PG:       [id number] Shubham Orozco M.D., Electronically signed on 11/30/2018 at   10:41:21 AM              *** Final ***                    SHUBHAM OROZCO MD  This document has been electronically signed. Nov 30 2018  7:10AM    CATH  	  · Note Type	Event Note  brief cath report	      PRE-OP DIAGNOSIS: NSTEMI    PROCEDURE: [facility] with coronary angiography    Physician: Dr Rodrigues  Assistant: Gallo    ANESTHESIA TYPE:  [  ]General Anesthesia  [ x ] Sedation  [  ] Local/Regional    ESTIMATED BLOOD LOSS:    10   mL    CONDITION  [  ] Critical  [  ] Serious  [  ]Fair  [ x ]Good      IV CONTRAST:      225       mL    FINDINGS    Left Heart Catheterization:  LVEF%: 55 %  LVEDP: mild elevation      ACCESS:    [x ] right radial artery : D stat applied  [ x] right femoral artery  Angioseal used    LEFT HEART CATHETERIZATION                                    Left main no disease  LAD:   mid LAD 80 % lesion                      Left Circumflex: 80% lesion before OM1  Right Coronary Artery: large dominant , no disease    INTERVENTION  IMPLANTS: COBRA stent in prox Circ and mid LAD      POST-OP DIAGNOSIS  NSTEMI : 2 vessels CAD : PCI to mid LAD ( 2.5 24mm COBRA stent ) , prox Circ (3.5 30mm COBRA stent)      PLAN OF CARE  [ ] D/C Home today  [ ]  D/C in AM  [x ] Return to In-patient bed  [ ] Admit for observation  [ ] Return for staged procedure:  [ ] CT Surgery consult called  [x ]  Continue DAPT, B-blocker & Statin therapy    Pt can be on antiplatlet for 2-4 weeks. if any bleeding issues.  Do not transfuse this pt unless you speak with fellow or attending or unless emergency.      Electronic Signatures:  Manju Holder)  (Signed 28-Nov-2018 17:35)  	Authored: Note Type, Note  Hubert Rodrigues)  (Signed 28-Nov-2018 17:46)  	Authored: Note  	Co-Signer: Note Type, Note      Last Updated: 28-Nov-2018 17:46 by Hubert Rodrigues)    LABS                        7.8    22.88 )-----------( 506      ( 02 Dec 2018 11:58 )             24.4     12-03    139  |  103  |  40<H>  ----------------------------<  92  4.3   |  19  |  1.7<H>    Ca    7.6<L>      03 Dec 2018 04:05  Phos  2.5     12-03  Mg     2.1     12-03    TPro  5.1<L>  /  Alb  2.2<L>  /  TBili  0.5  /  DBili  x   /  AST  45<H>  /  ALT  39  /  AlkPhos  107  12-03    CARDIAC MARKERS ( 03 Dec 2018 04:05 )  x     / 2.56 ng/mL / x     / x     / x          LIVER FUNCTIONS - ( 03 Dec 2018 04:05 )  Alb: 2.2 g/dL / Pro: 5.1 g/dL / ALK PHOS: 107 U/L / ALT: 39 U/L / AST: 45 U/L / GGT: x                 A/P:  I discussed the case with Interventional Cardiologist Dr. Rodrigues & recommends the following:    S/P PCI pLCX and mLAD cobra stent x 2  	         Continue DAPT,(asa 81mg, plavix 75 mg daily) Statin Therapy, BB                    30 day supply of DAPT given for home use only                     f/u am labs, H/H, monitor Cr, trending down                     EF n/l and CE trending down                     R arm ecchymotic and swollen , + pulses, duplex negative, continue warm compresses and elevation(SEEN AND EVALUATED BY DR. RODRIGUES)                   Pt given instructions on importance of taking antiplatelet medication or risk acute stent thrombosis/death                   Post cath instructions, access site care and activity restrictions reviewed with patient                     Discussed with patient to return to hospital if experience chest pain, shortness breath, dizziness and site bleeding                   Aggressive risk factor modification, diet counseling, smoking cessation discussed with patient                       clear for d/c from cardiac standpoint, pt awaiting rehab placement

## 2018-12-05 ENCOUNTER — TRANSCRIPTION ENCOUNTER (OUTPATIENT)
Age: 78
End: 2018-12-05

## 2018-12-05 VITALS
SYSTOLIC BLOOD PRESSURE: 115 MMHG | DIASTOLIC BLOOD PRESSURE: 53 MMHG | TEMPERATURE: 100 F | HEART RATE: 108 BPM | RESPIRATION RATE: 20 BRPM

## 2018-12-05 LAB
ANION GAP SERPL CALC-SCNC: 16 MMOL/L — HIGH (ref 7–14)
BUN SERPL-MCNC: 30 MG/DL — HIGH (ref 10–20)
CALCIUM SERPL-MCNC: 8 MG/DL — LOW (ref 8.5–10.1)
CHLORIDE SERPL-SCNC: 102 MMOL/L — SIGNIFICANT CHANGE UP (ref 98–110)
CO2 SERPL-SCNC: 21 MMOL/L — SIGNIFICANT CHANGE UP (ref 17–32)
CREAT SERPL-MCNC: 1.7 MG/DL — HIGH (ref 0.7–1.5)
GLUCOSE SERPL-MCNC: 106 MG/DL — HIGH (ref 70–99)
HCT VFR BLD CALC: 25.6 % — LOW (ref 37–47)
HGB BLD-MCNC: 8 G/DL — LOW (ref 12–16)
MAGNESIUM SERPL-MCNC: 2 MG/DL — SIGNIFICANT CHANGE UP (ref 1.8–2.4)
MCHC RBC-ENTMCNC: 26.8 PG — LOW (ref 27–31)
MCHC RBC-ENTMCNC: 31.3 G/DL — LOW (ref 32–37)
MCV RBC AUTO: 85.6 FL — SIGNIFICANT CHANGE UP (ref 81–99)
NRBC # BLD: 0 /100 WBCS — SIGNIFICANT CHANGE UP (ref 0–0)
PHOSPHATE SERPL-MCNC: 3.1 MG/DL — SIGNIFICANT CHANGE UP (ref 2.1–4.9)
PLATELET # BLD AUTO: 624 K/UL — HIGH (ref 130–400)
POTASSIUM SERPL-MCNC: 4.8 MMOL/L — SIGNIFICANT CHANGE UP (ref 3.5–5)
POTASSIUM SERPL-SCNC: 4.8 MMOL/L — SIGNIFICANT CHANGE UP (ref 3.5–5)
RBC # BLD: 2.99 M/UL — LOW (ref 4.2–5.4)
RBC # FLD: 19.5 % — HIGH (ref 11.5–14.5)
SODIUM SERPL-SCNC: 139 MMOL/L — SIGNIFICANT CHANGE UP (ref 135–146)
WBC # BLD: 17.05 K/UL — HIGH (ref 4.8–10.8)
WBC # FLD AUTO: 17.05 K/UL — HIGH (ref 4.8–10.8)

## 2018-12-05 RX ORDER — CLOPIDOGREL BISULFATE 75 MG/1
1 TABLET, FILM COATED ORAL
Qty: 0 | Refills: 0 | DISCHARGE
Start: 2018-12-05

## 2018-12-05 RX ORDER — METOPROLOL TARTRATE 50 MG
12.5 TABLET ORAL
Qty: 0 | Refills: 0 | DISCHARGE
Start: 2018-12-05

## 2018-12-05 RX ORDER — ATORVASTATIN CALCIUM 80 MG/1
1 TABLET, FILM COATED ORAL
Qty: 0 | Refills: 0 | DISCHARGE
Start: 2018-12-05

## 2018-12-05 RX ORDER — CIPROFLOXACIN LACTATE 400MG/40ML
1 VIAL (ML) INTRAVENOUS
Qty: 0 | Refills: 0 | DISCHARGE
Start: 2018-12-05

## 2018-12-05 RX ORDER — HEPARIN SODIUM 5000 [USP'U]/ML
5000 INJECTION INTRAVENOUS; SUBCUTANEOUS
Qty: 0 | Refills: 0 | DISCHARGE
Start: 2018-12-05

## 2018-12-05 RX ORDER — ATENOLOL AND CHLORTHALIDONE 50; 25 MG/1; MG/1
1 TABLET ORAL
Qty: 0 | Refills: 0 | COMMUNITY

## 2018-12-05 RX ORDER — LOSARTAN/HYDROCHLOROTHIAZIDE 100MG-25MG
1 TABLET ORAL
Qty: 0 | Refills: 0 | COMMUNITY

## 2018-12-05 RX ADMIN — CLOPIDOGREL BISULFATE 75 MILLIGRAM(S): 75 TABLET, FILM COATED ORAL at 11:53

## 2018-12-05 RX ADMIN — Medication 12.5 MILLIGRAM(S): at 05:47

## 2018-12-05 RX ADMIN — Medication 600 MILLIGRAM(S): at 05:47

## 2018-12-05 RX ADMIN — HEPARIN SODIUM 5000 UNIT(S): 5000 INJECTION INTRAVENOUS; SUBCUTANEOUS at 05:47

## 2018-12-05 RX ADMIN — Medication 81 MILLIGRAM(S): at 11:53

## 2018-12-05 RX ADMIN — Medication 100 MILLIGRAM(S): at 11:53

## 2018-12-05 RX ADMIN — HEPARIN SODIUM 5000 UNIT(S): 5000 INJECTION INTRAVENOUS; SUBCUTANEOUS at 13:34

## 2018-12-05 RX ADMIN — CHLORHEXIDINE GLUCONATE 1 APPLICATION(S): 213 SOLUTION TOPICAL at 05:47

## 2018-12-05 RX ADMIN — Medication 500 MILLIGRAM(S): at 05:47

## 2018-12-05 RX ADMIN — Medication 1 APPLICATION(S): at 11:54

## 2018-12-05 NOTE — DISCHARGE NOTE ADULT - HOSPITAL COURSE
77 y/o woman with PMH of ORIF for right periprosthetic LE fracture on 11/22/18, arthritis, IBS (diarrhea predominant) and HTN presented with chest pain while at [facility].   She was diagnosed with NSTEMI and underwent PCI with stents to LAD and LCx. Hospital course complicated by acute anemia, orthostatic hypotension, ESTEFANY, urinary retention, fever, UTI and rash.    # NSTEMI s/p stents to LAD and LCx  -Stable   cardiac enzymes trended down  on ASA/Plavix/metoprolol/statin  cardio f/u  outpatient. DAPT.     # Fever - now resolved  ID consult appreciated - on Cipro to cover for urinary source of fever  f/u all cultures and WBC count  repeat CXR - pt without symptoms of pneumonia  monitor temps    # Anemia (acute over chronic)  s/p transfusion over the weekend and Hgb improved   stool guaiac negative  Ortho f/u appreciated - not due to right LE wound  monitor H/H - check CBC today and in AM    # ESTEFANY - creatinine now improving after Edwards was placed for urinary retention  monitor urine outpt and BMP  Void spontaneously. Retention resolved  pt needs OOB to chair daily    # Right UE hematoma/edema - venous duplex negative  elevate and warm compress and monitor size  check CBC  if progressive, then check US of right UE    # s/p recent ORIF for right periprosthetic LE fracture  rehab consult appreciated - for STR at SNF when medically stabl 77 y/o woman with PMH of ORIF for right periprosthetic LE fracture on 11/22/18, arthritis, IBS (diarrhea predominant) and HTN presented with chest pain while at STR at SNF.   She was diagnosed with NSTEMI and underwent PCI with stents to LAD and LCx. Hospital course complicated by acute anemia, orthostatic hypotension, ESTEFANY, urinary retention, fever, UTI and rash.    # NSTEMI s/p stents to LAD and LCx  -Stable   cardiac enzymes trended down  on ASA/Plavix/metoprolol/statin  cardio f/u  outpatient. DAPT.     # Fever - now resolved  ID consult appreciated - on Cipro to cover for urinary source of fever  WBC count likely reactive and now stable  blood cultures no growth  CXR - improved - no evidence of pneumonia    # Anemia (acute over chronic)  s/p transfusion over the weekend and Hgb improved and now stable  stool guaiac negative  Ortho f/u appreciated - not due to right LE wound    # ESTEFANY - possibly due to MONIQUE, urinary retention and ATN (hypotensive in CCU)  monitor urine output and BMP  Void spontaneously. Retention resolved  pt needs OOB to chair daily    # Right UE hematoma/edema - now stable - venous duplex negative  elevate and warm compress     # s/p recent ORIF for right periprosthetic LE fracture  rehab consult appreciated - for STR at SNF when medically stable  NWB right LE 77 y/o woman with PMH of ORIF for right periprosthetic LE fracture on 11/22/18, arthritis, IBS (diarrhea predominant) and HTN presented with chest pain while at STR at SNF.   She was diagnosed with NSTEMI and underwent PCI with stents to LAD and LCx. Hospital course complicated by acute anemia, orthostatic hypotension, ESTEFANY, urinary retention, fever, UTI and rash.    # NSTEMI s/p stents to LAD and LCx  -Stable   cardiac enzymes trended down  on ASA/Plavix/metoprolol/statin  cardio f/u  outpatient. DAPT.     # Fever - now resolved  ID consult appreciated - on Cipro to cover for urinary source of fever  WBC count likely reactive and now stable  blood cultures no growth  CXR - improved - no evidence of pneumonia    # Anemia (acute over chronic)  s/p transfusion over the weekend and Hgb improved and now stable  stool guaiac negative  Ortho f/u appreciated - not due to right LE wound    # ESTEFANY - possibly due to MONIQUE, urinary retention and ATN (hypotensive in CCU)  monitor urine output and BMP  Void spontaneously. Retention resolved  pt needs OOB to chair daily    # Right UE hematoma/edema - now stable - venous duplex negative  elevate and warm compress     # s/p recent ORIF for right periprosthetic LE fracture  rehab consult appreciated - for STR at SNF when medically stable  NWB right LE. Follow in 1-2 weeks with Dr. Velasquez for staple removal and post op check

## 2018-12-05 NOTE — DISCHARGE NOTE ADULT - CARE PROVIDERS DIRECT ADDRESSES
,DirectAddress_Unknown ,DirectAddress_Unknown,[email or web address]

## 2018-12-05 NOTE — PROGRESS NOTE ADULT - SUBJECTIVE AND OBJECTIVE BOX
DAILY PROGRESS NOTE  ===========================================================  Patient Information:   Hospital Day:</AGUSTÍN TURCIOS  /  78y  /  Female  /b> 6d /  MRN#: [id number]  Working / Admitting Diagnosis:  1. STEMI    |:::::::::::::::::::::::::::::| SUBJECTIVE |:::::::::::::::::::::::::::::::|  OVERNIGHT EVENTS:   No acute events noted. Past trial of void. Was Out of bed to chair.   Denies chest pain / SOB / palpitations / Nausea / Vomitting / constipation / diarrhea or abdominal pain.   ROS otherwise negative.    Past Medical History:   STEMI  Arthritis  Other irritable bowel syndrome  H/O knee surgery  History of hip surgery  Hypertension    ALLERGIES:  penicillin (Rash; Anaphylaxis; Hives)    HOME MEDICATIONS:  Aspir 81 oral delayed release tablet: 1 tab(s) orally once a day (19 Nov 2018 15:42)  atenolol-chlorthalidone 50 mg-25 mg oral tablet: 1 tab(s) orally once a day (19 Nov 2018 15:42)  docusate sodium 100 mg oral capsule: 1 cap(s) orally 3 times a day (26 Nov 2018 15:19)  enoxaparin: 40 milligram(s) subcutaneous once a day (26 Nov 2018 15:19)  losartan-hydrochlorothiazide 50mg-12.5mg oral tablet: 1 tab(s) orally once a day (19 Nov 2018 15:42)  nitrofurantoin macrocrystals-monohydrate 100 mg oral capsule: 1 cap(s) orally 2 times a day (with meals) (26 Nov 2018 15:19)  oxyCODONE-acetaminophen 5 mg-325 mg oral tablet: 1 tab(s) orally every 4 hours, As needed, Severe Pain (7 - 10) (26 Nov 2018 15:19)      |:::::::::::::::::::::::::::| OBJECTIVE |:::::::::::::::::::::::::::|    VITAL SIGNS: Last 24 Hours  Vital Signs Last 24 Hrs  T(C): 36.8 (05 Dec 2018 05:10), Max: 37.3 (04 Dec 2018 21:09)  T(F): 98.2 (05 Dec 2018 05:10), Max: 99.1 (04 Dec 2018 21:09)  HR: 97 (05 Dec 2018 05:10) (97 - 104)  BP: 121/60 (05 Dec 2018 05:10) (116/56 - 123/62)  RR: 18 (05 Dec 2018 05:10) (18 - 20)    PHYSICAL EXAM:  GENERAL:   Awake, alert; NAD.  HEENT:  Head NC/AT; Conjunctivae pink, Sclera anicteric & non-injected; Oral mucosa moist.  NECK:   Supple.  CARDIO:   RRR; S1 & S2 audible  RESP:  No respiratory distress or accessory muscle use. CTAB  GI:   Soft/ NT/ND / No guarding; No rebound tenderness.  EXT:   Without any cyanosis, clubbing, rash, lesions or edema.     LAB RESULTS:                           8.0    17.05 )-----------( 624      ( 05 Dec 2018 07:37 )             25.6   12-05    139  |  102  |  30<H>  ----------------------------<  106<H>  4.8   |  21  |  1.7<H>    Ca    8.0<L>      05 Dec 2018 07:37  Phos  3.1     12-05  Mg     2.0     12-05        MICROBIOLOGY:    Culture - Stool (collected 02 Dec 2018 21:35)  Source: .Stool Feces  Preliminary Report (03 Dec 2018 20:04):    No enteric pathogens to date: Final culture pending    Culture - Urine (collected 02 Dec 2018 09:00)  Source: .Urine Clean Catch (Midstream)  Final Report (03 Dec 2018 21:13):    Culture grew 3 or more types of organisms which indicate    collection contamination; consider recollection only if clinically    indicated.    Culture - Blood (collected 02 Dec 2018 00:53)  Source: .Blood None  Preliminary Report (03 Dec 2018 13:02):    No growth to date.    INPATIENT MEDICATIONS:  aspirin  chewable 81 milliGRAM(s) Oral daily  atorvastatin 80 milliGRAM(s) Oral at bedtime  BACItracin   Ointment 1 Application(s) Topical daily  chlorhexidine 4% Liquid 1 Application(s) Topical <User Schedule>  ciprofloxacin     Tablet 500 milliGRAM(s) Oral every 12 hours  clopidogrel Tablet 75 milliGRAM(s) Oral daily  docusate sodium 100 milliGRAM(s) Oral daily  guaiFENesin  milliGRAM(s) Oral every 12 hours  heparin  Injectable 5000 Unit(s) SubCutaneous every 8 hours  metoprolol tartrate 12.5 milliGRAM(s) Oral two times a day    PRN MEDICATIONS  acetaminophen   Tablet .. 650 milliGRAM(s) Oral every 6 hours PRN  bisacodyl Suppository 10 milliGRAM(s) Rectal daily PRN  diphenhydrAMINE 25 milliGRAM(s) Oral every 6 hours PRN    ------------------------------------------------------------------------------------------------------------

## 2018-12-05 NOTE — PROGRESS NOTE ADULT - SUBJECTIVE AND OBJECTIVE BOX
Discharge note      AGUSTÍN TURCIOS  78y Female    INTERVAL HPI/OVERNIGHT EVENTS:    Pt feels better today. Right forearm hematoma is stable - no increase in size or pain.   She is urinating after the brennan was removed yesterday.  She had BM after an enema.   She's in agreement for discharge to [facility] a [facility] today.  All questions answered.     T(F): 99.6 (12-05-18 @ 13:15), Max: 99.6 (12-05-18 @ 13:15)  HR: 108 (12-05-18 @ 13:15) (97 - 108)  BP: 115/53 (12-05-18 @ 13:15) (115/53 - 123/62)  RR: 20 (12-05-18 @ 13:15) (18 - 20)  SpO2: 97% (12-05-18 @ 09:25) (97% - 97%) on RA    I&O's Summary    04 Dec 2018 07:01  -  05 Dec 2018 07:00  --------------------------------------------------------  IN: 440 mL / OUT: 0 mL / NET: 440 mL    05 Dec 2018 07:01  -  05 Dec 2018 14:12  --------------------------------------------------------  IN: 610 mL / OUT: 250 mL / NET: 360 mL      PHYSICAL EXAM:  GENERAL: NAD  HEAD:  Normocephalic  EYES:  conjunctiva and sclera clear  ENMT: Moist mucous membranes  NECK: Supple  NERVOUS SYSTEM:  Alert & Oriented X3, Good concentration  CHEST/LUNG: Clear to percussion bilaterally; No rales, rhonchi, wheezing  HEART: Regular rate and rhythm  ABDOMEN: Soft, Nontender, Nondistended; Bowel sounds present  EXTREMITIES:  right LE immobilizer  Right forearm hematoma stable, right radial pulse 2+  SKIN: ecchymoses    Consultant(s) Notes Reviewed:  [x ] YES  [ ] NO  Care Discussed with Consultants/Other Providers [ x] YES  [ ] NO    MEDICATIONS  (STANDING):  aspirin  chewable 81 milliGRAM(s) Oral daily  atorvastatin 80 milliGRAM(s) Oral at bedtime  BACItracin   Ointment 1 Application(s) Topical daily  chlorhexidine 4% Liquid 1 Application(s) Topical <User Schedule>  ciprofloxacin     Tablet 500 milliGRAM(s) Oral every 12 hours  clopidogrel Tablet 75 milliGRAM(s) Oral daily  docusate sodium 100 milliGRAM(s) Oral daily  guaiFENesin  milliGRAM(s) Oral every 12 hours  heparin  Injectable 5000 Unit(s) SubCutaneous every 8 hours  metoprolol tartrate 12.5 milliGRAM(s) Oral two times a day    MEDICATIONS  (PRN):  acetaminophen   Tablet .. 650 milliGRAM(s) Oral every 6 hours PRN Temp greater or equal to 38C (100.4F)  bisacodyl Suppository 10 milliGRAM(s) Rectal daily PRN Constipation  diphenhydrAMINE 25 milliGRAM(s) Oral every 6 hours PRN Rash and/or Itching      LABS:                        8.0    17.05 )-----------( 624      ( 05 Dec 2018 07:37 )             25.6     12-05    139  |  102  |  30<H>  ----------------------------<  106<H>  4.8   |  21  |  1.7<H>    Ca    8.0<L>      05 Dec 2018 07:37  Phos  3.1     12-05  Mg     2.0     12-05          Culture - Stool (collected 02 Dec 2018 21:35)  Source: .Stool Feces  Final Report (04 Dec 2018 18:01):    No enteric pathogens isolated.    (Stool culture examined for Salmonella,    Shigella, Campylobacter, Aeromonas, Plesiomonas,    Vibrio, E.coli O157 and Yersinia)        RADIOLOGY & ADDITIONAL TESTS:    Imaging or report Personally Reviewed:  [ x] YES  [ ] NO    < from: Xray Chest 1 View- PORTABLE-Routine (12.03.18 @ 05:29) >  Impression:      Improving bilateral opacities.    < end of copied text >      Case discussed with resident    Care discussed with pt

## 2018-12-05 NOTE — DISCHARGE NOTE ADULT - MEDICATION SUMMARY - MEDICATIONS TO TAKE
I will START or STAY ON the medications listed below when I get home from the hospital:    Aspir 81 oral delayed release tablet  -- 1 tab(s) by mouth once a day  -- Indication: For STEMI (ST elevation myocardial infarction)    heparin  -- 5000 unit(s) subcutaneous every 8 hours  -- Indication: For DVT prophylaxisis    atorvastatin 80 mg oral tablet  -- 1 tab(s) by mouth once a day (at bedtime)  -- Indication: For STEMI (ST elevation myocardial infarction)    clopidogrel 75 mg oral tablet  -- 1 tab(s) by mouth once a day  -- Indication: For STEMI (ST elevation myocardial infarction)    metoprolol  -- 12.5 milligram(s) by mouth 2 times a day  -- Indication: For STEMI (ST elevation myocardial infarction)    docusate sodium 100 mg oral capsule  -- 1 cap(s) by mouth 3 times a day  -- Indication: For constiaption    bisacodyl 10 mg rectal suppository  -- 1 suppository(ies) rectally once a day, As needed, Constipation  -- Indication: For constipation    ciprofloxacin 500 mg oral tablet  -- 1 tab(s) by mouth every 12 hours  -- Indication: For UTI

## 2018-12-05 NOTE — DISCHARGE NOTE ADULT - CARE PLAN
Principal Discharge DX:	STEMI (ST elevation myocardial infarction)  Goal:	Medical management after cardiac cath  Assessment and plan of treatment:	Follow up with Dr. Rodrigues cardiologist. Repeat Kidney function test for ESTEFANY And asses starting ACE/ARB  Complete Rehab at NH for physical therapy

## 2018-12-05 NOTE — DISCHARGE NOTE ADULT - CARE PROVIDER_API CALL
Hubert Rodrigues), Cardiovascular Disease; Interventional Cardiology  [address] [place] [address]  Phone: [phone]  Fax: [phone] Hubert Rodrigues), Cardiovascular Disease; Interventional Cardiology  [address] [place] [address]  Phone: [phone]  Fax: [phone]    Pelon Velasquez), Orthopaedic Surgery  [address] [place] [id number]  Phone: [phone]  Fax: [phone]

## 2018-12-05 NOTE — PROGRESS NOTE ADULT - ASSESSMENT
77 y/o woman with PMH of ORIF for right periprosthetic LE fracture on 11/22/18, arthritis, IBS (diarrhea predominant) and HTN presented with chest pain while at STR at [facility].   She was diagnosed with NSTEMI and underwent PCI with stents to LAD and LCx. Hospital course complicated by acute anemia, orthostatic hypotension, ESTEFANY, urinary retention, fever, UTI and rash.    1. NSTEMI s/p stents to LAD and LCx  cardiac enzymes trended down  on ASA/Plavix/metoprolol/statin  outpt cardio f/u on discharge in 1 week    2. Fever - now resolved  ID f/u appreciated - on Cipro to cover for urinary source of fever- complete 7 day course  blood cultures negative and WBC stable - likely reactive per ID  repeat CXR shows improved b/l opacities - no pneumonia clinically    3. Anemia (acute over chronic)  s/p transfusion over the weekend and Hgb improved and now stable  stool guaiac negative  Ortho f/u appreciated - not due to right LE wound    4. ESTEFANY - possibly due to several causes (MONIQUE - pt s/p cath on 11/28, urinary retention requiring brennan and now resolved and ATN from hypotension while in CCU)  creatinine improved overall and relatively stable now  monitor urine output and BMP as outpt  pt needs OOB to chair daily    5. Right UE hematoma/edema - now stable - venous duplex negative   continue elevation and warm compress   Hgb stable/improved    6. s/p recent ORIF for right periprosthetic LE fracture  for discharge to STR at [facility] today after evaluation by physical therapy  NWB Right LE per Ortho  outpt Ortho f/u    7. Rash - chronic and intermittent per pt - better on benadryl  occurred before meropenem so doubt drug rash    8. DVT prophylaxis - continue at STR    9. Prognosis - improved    Medication reconciliation reviewed with resident.     Spent 45 minutes in the discharge process of this pt    Monitor CBC and BMP and urine output and BM's at [facility]

## 2018-12-05 NOTE — PROGRESS NOTE ADULT - REASON FOR ADMISSION
chest pain

## 2018-12-05 NOTE — DISCHARGE NOTE ADULT - PATIENT PORTAL LINK FT
You can access the [facility] Patient Portal, offered by [facility], by registering with the following website: [email or web address]

## 2018-12-05 NOTE — PROGRESS NOTE ADULT - ASSESSMENT
IMPRESSION:  No evidence of pyelonephritis secondary to polymicrobial jo ann.  Leucocytosis secondary to NSTEMI in all probability.    RECOMMENDATIONS:  Ciprofloxacin 500 mg po q12h for 7 days starting 12/2  recall prn please

## 2018-12-05 NOTE — PROGRESS NOTE ADULT - SUBJECTIVE AND OBJECTIVE BOX
AGUSTÍN TURCIOS  78y, Female      OVERNIGHT EVENTS:  no fevers, brennan is out.  No  complaints  VITALS:  T(F): 98.2, Max: 99.1 (12-04-18 @ 21:09)  HR: 97  BP: 121/60  RR: 18Vital Signs Last 24 Hrs  T(C): 36.8 (05 Dec 2018 05:10), Max: 37.3 (04 Dec 2018 21:09)  T(F): 98.2 (05 Dec 2018 05:10), Max: 99.1 (04 Dec 2018 21:09)  HR: 97 (05 Dec 2018 05:10) (97 - 104)  BP: 121/60 (05 Dec 2018 05:10) (116/56 - 123/62)  BP(mean): --  RR: 18 (05 Dec 2018 05:10) (18 - 20)  SpO2: --    TESTS & MEASUREMENTS:                        8.2    17.58 )-----------( 636      ( 04 Dec 2018 11:39 )             25.9     12-04    134<L>  |  98  |  31<H>  ----------------------------<  121<H>  4.8   |  20  |  1.6<H>    Ca    8.3<L>      04 Dec 2018 11:39  Mg     2.0     12-04          Culture - Stool (collected 12-02-18 @ 21:35)  Source: .Stool Feces  Final Report (12-04-18 @ 18:01):    No enteric pathogens isolated.    (Stool culture examined for Salmonella,    Shigella, Campylobacter, Aeromonas, Plesiomonas,    Vibrio, E.coli O157 and Yersinia)    Culture - Urine (collected 12-02-18 @ 09:00)  Source: .Urine Clean Catch (Midstream)  Final Report (12-03-18 @ 21:13):    Culture grew 3 or more types of organisms which indicate    collection contamination; consider recollection only if clinically    indicated.    Culture - Blood (collected 12-02-18 @ 00:53)  Source: .Blood None  Preliminary Report (12-03-18 @ 13:02):    No growth to date.            RADIOLOGY & ADDITIONAL TESTS:    ANTIBIOTICS:  ciprofloxacin     Tablet 500 milliGRAM(s) Oral every 12 hours

## 2018-12-05 NOTE — PROGRESS NOTE ADULT - ASSESSMENT
79 y/o woman with PMH of ORIF for right periprosthetic LE fracture on 11/22/18, arthritis, IBS (diarrhea predominant) and HTN presented with chest pain [facility].   She was diagnosed with NSTEMI and underwent PCI with stents to LAD and LCx. Hospital course complicated by acute anemia, orthostatic hypotension, ESTEFANY, urinary retention, fever, UTI and rash.    # NSTEMI s/p stents to LAD and LCx  -Stable   cardiac enzymes trended down  on ASA/Plavix/metoprolol/statin  cardio f/u  outpatient. DAPT.     # Fever - now resolved  ID consult appreciated - on Cipro to cover for urinary source of fever  f/u all cultures and WBC count  repeat CXR - pt without symptoms of pneumonia  monitor temps    # Anemia (acute over chronic)  s/p transfusion over the weekend and Hgb improved   stool guaiac negative  Ortho f/u appreciated - not due to right LE wound  monitor H/H - check CBC today and in AM    # ESTEFANY - creatinine now improving after Edwards was placed for urinary retention  monitor urine outpt and BMP  Void spontaneously. Retention resolved  pt needs OOB to chair daily    # Right UE hematoma/edema - venous duplex negative  elevate and warm compress and monitor size  check CBC  if progressive, then check US of right UE    # s/p recent ORIF for right periprosthetic LE fracture  rehab consult appreciated - for STR at SNF when medically stable    # Rash - chronic and intermittent per pt - better on benadryl  occurred before meropenem so doubt drug rash    # DVT prophylaxis  # Dispo - Back to [facility] once updated PT note available

## 2018-12-05 NOTE — PROGRESS NOTE ADULT - PROVIDER SPECIALTY LIST ADULT
CCU
Cardiology
Hospitalist
Infectious Disease
Infectious Disease
Internal Medicine
Internal Medicine
Orthopedics
Hospitalist

## 2018-12-06 ENCOUNTER — OUTPATIENT (OUTPATIENT)
Dept: OUTPATIENT SERVICES | Facility: HOSPITAL | Age: 78
LOS: 1 days | Discharge: HOME | End: 2018-12-06

## 2018-12-06 DIAGNOSIS — R79.9 ABNORMAL FINDING OF BLOOD CHEMISTRY, UNSPECIFIED: ICD-10-CM

## 2018-12-06 PROBLEM — M19.90 UNSPECIFIED OSTEOARTHRITIS, UNSPECIFIED SITE: Chronic | Status: ACTIVE | Noted: 2018-11-28

## 2018-12-07 DIAGNOSIS — R50.9 FEVER, UNSPECIFIED: ICD-10-CM

## 2018-12-07 DIAGNOSIS — I21.4 NON-ST ELEVATION (NSTEMI) MYOCARDIAL INFARCTION: ICD-10-CM

## 2018-12-07 DIAGNOSIS — C44.709 UNSPECIFIED MALIGNANT NEOPLASM OF SKIN OF LEFT LOWER LIMB, INCLUDING HIP: ICD-10-CM
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
## 2018-12-07 DIAGNOSIS — Z79.891 LONG TERM (CURRENT) USE OF OPIATE ANALGESIC: ICD-10-CM

## 2018-12-07 DIAGNOSIS — M19.90 UNSPECIFIED OSTEOARTHRITIS, UNSPECIFIED SITE: ICD-10-CM

## 2018-12-07 DIAGNOSIS — D72.89 OTHER SPECIFIED DISORDERS OF WHITE BLOOD CELLS: ICD-10-CM

## 2018-12-07 DIAGNOSIS — Z88.0 ALLERGY STATUS TO PENICILLIN: ICD-10-CM

## 2018-12-07 DIAGNOSIS — I25.10 ATHEROSCLEROTIC HEART DISEASE OF NATIVE CORONARY ARTERY WITHOUT ANGINA PECTORIS: ICD-10-CM

## 2018-12-07 DIAGNOSIS — N39.0 URINARY TRACT INFECTION, SITE NOT SPECIFIED: ICD-10-CM

## 2018-12-07 DIAGNOSIS — I95.1 ORTHOSTATIC HYPOTENSION: ICD-10-CM

## 2018-12-07 DIAGNOSIS — Z79.82 LONG TERM (CURRENT) USE OF ASPIRIN: ICD-10-CM

## 2018-12-07 DIAGNOSIS — S72.91XD UNSPECIFIED FRACTURE OF RIGHT FEMUR, SUBSEQUENT ENCOUNTER FOR CLOSED FRACTURE WITH ROUTINE HEALING: ICD-10-CM

## 2018-12-07 DIAGNOSIS — D64.9 ANEMIA, UNSPECIFIED: ICD-10-CM

## 2018-12-07 DIAGNOSIS — E83.42 HYPOMAGNESEMIA: ICD-10-CM

## 2018-12-07 DIAGNOSIS — K59.00 CONSTIPATION, UNSPECIFIED: ICD-10-CM

## 2018-12-07 DIAGNOSIS — R33.8 OTHER RETENTION OF URINE: ICD-10-CM

## 2018-12-07 DIAGNOSIS — I34.0 NONRHEUMATIC MITRAL (VALVE) INSUFFICIENCY: ICD-10-CM

## 2018-12-07 DIAGNOSIS — I10 ESSENTIAL (PRIMARY) HYPERTENSION: ICD-10-CM

## 2018-12-07 DIAGNOSIS — R21 RASH AND OTHER NONSPECIFIC SKIN ERUPTION: ICD-10-CM

## 2018-12-07 DIAGNOSIS — I45.81 LONG QT SYNDROME: ICD-10-CM

## 2018-12-07 DIAGNOSIS — L76.31 POSTPROCEDURAL HEMATOMA OF SKIN AND SUBCUTANEOUS TISSUE FOLLOWING A DERMATOLOGIC PROCEDURE: ICD-10-CM

## 2018-12-07 DIAGNOSIS — N17.9 ACUTE KIDNEY FAILURE, UNSPECIFIED: ICD-10-CM

## 2018-12-07 DIAGNOSIS — I21.3 ST ELEVATION (STEMI) MYOCARDIAL INFARCTION OF UNSPECIFIED SITE: ICD-10-CM

## 2018-12-07 DIAGNOSIS — K58.0 IRRITABLE BOWEL SYNDROME WITH DIARRHEA: ICD-10-CM

## 2018-12-07 LAB
CULTURE RESULTS: SIGNIFICANT CHANGE UP
SPECIMEN SOURCE: SIGNIFICANT CHANGE UP

## 2018-12-11 ENCOUNTER — OUTPATIENT (OUTPATIENT)
Dept: OUTPATIENT SERVICES | Facility: HOSPITAL | Age: 78
LOS: 1 days | Discharge: HOME | End: 2018-12-11

## 2018-12-11 DIAGNOSIS — D72.829 ELEVATED WHITE BLOOD CELL COUNT, UNSPECIFIED: ICD-10-CM

## 2018-12-11 DIAGNOSIS — N18.9 CHRONIC KIDNEY DISEASE, UNSPECIFIED: ICD-10-CM

## 2018-12-14 ENCOUNTER — OUTPATIENT (OUTPATIENT)
Dept: OUTPATIENT SERVICES | Facility: HOSPITAL | Age: 78
LOS: 1 days | Discharge: HOME | End: 2018-12-14

## 2018-12-14 DIAGNOSIS — R79.9 ABNORMAL FINDING OF BLOOD CHEMISTRY, UNSPECIFIED: ICD-10-CM

## 2018-12-28 ENCOUNTER — OUTPATIENT (OUTPATIENT)
Dept: OUTPATIENT SERVICES | Facility: HOSPITAL | Age: 78
LOS: 1 days | Discharge: HOME | End: 2018-12-28

## 2018-12-28 DIAGNOSIS — R68.89 OTHER GENERAL SYMPTOMS AND SIGNS: ICD-10-CM

## 2019-01-09 ENCOUNTER — OUTPATIENT (OUTPATIENT)
Dept: OUTPATIENT SERVICES | Facility: HOSPITAL | Age: 79
LOS: 1 days | Discharge: HOME | End: 2019-01-09

## 2019-01-09 DIAGNOSIS — I82.91 CHRONIC EMBOLISM AND THROMBOSIS OF UNSPECIFIED VEIN: ICD-10-CM

## 2019-01-15 ENCOUNTER — APPOINTMENT (OUTPATIENT)
Dept: PLASTIC SURGERY | Facility: CLINIC | Age: 79
End: 2019-01-15
Payer: MEDICARE

## 2019-01-15 VITALS — WEIGHT: 185 LBS | HEIGHT: 67 IN | BODY MASS INDEX: 29.03 KG/M2

## 2019-01-15 DIAGNOSIS — Z78.9 OTHER SPECIFIED HEALTH STATUS: ICD-10-CM

## 2019-01-15 DIAGNOSIS — Z86.79 PERSONAL HISTORY OF OTHER DISEASES OF THE CIRCULATORY SYSTEM: ICD-10-CM

## 2019-01-15 DIAGNOSIS — Z87.891 PERSONAL HISTORY OF NICOTINE DEPENDENCE: ICD-10-CM

## 2019-01-15 DIAGNOSIS — I25.2 OLD MYOCARDIAL INFARCTION: ICD-10-CM

## 2019-01-15 DIAGNOSIS — Z87.19 PERSONAL HISTORY OF OTHER DISEASES OF THE DIGESTIVE SYSTEM: ICD-10-CM

## 2019-01-15 PROCEDURE — 99203 OFFICE O/P NEW LOW 30 MIN: CPT

## 2019-01-15 RX ORDER — BISACODYL 10 MG/1
10 SUPPOSITORY RECTAL
Refills: 0 | Status: ACTIVE | COMMUNITY

## 2019-01-15 RX ORDER — ASPIRIN 81 MG
81 TABLET, DELAYED RELEASE (ENTERIC COATED) ORAL
Refills: 0 | Status: ACTIVE | COMMUNITY

## 2019-01-15 RX ORDER — HYDROCORTISONE 10 MG/G
1 CREAM TOPICAL
Refills: 0 | Status: ACTIVE | COMMUNITY

## 2019-01-15 RX ORDER — ACETAMINOPHEN 325 MG/1
325 TABLET ORAL
Refills: 0 | Status: ACTIVE | COMMUNITY

## 2019-01-15 RX ORDER — METOPROLOL TARTRATE 25 MG/1
25 TABLET, FILM COATED ORAL
Refills: 0 | Status: ACTIVE | COMMUNITY

## 2019-01-15 RX ORDER — CLOPIDOGREL BISULFATE 75 MG/1
75 TABLET, FILM COATED ORAL
Refills: 0 | Status: ACTIVE | COMMUNITY

## 2019-01-15 RX ORDER — DOCUSATE SODIUM 100 MG/1
100 CAPSULE ORAL
Refills: 0 | Status: ACTIVE | COMMUNITY

## 2019-01-15 RX ORDER — ATORVASTATIN CALCIUM 80 MG/1
80 TABLET, FILM COATED ORAL
Refills: 0 | Status: ACTIVE | COMMUNITY

## 2019-01-15 RX ORDER — GUAIFENESIN/DEXTROMETHORPHAN 100-10MG/5
SYRUP ORAL
Refills: 0 | Status: ACTIVE | COMMUNITY

## 2019-01-15 RX ORDER — ENOXAPARIN SODIUM 100 MG/ML
60 INJECTION SUBCUTANEOUS
Refills: 0 | Status: ACTIVE | COMMUNITY

## 2019-01-15 RX ORDER — ALPRAZOLAM 0.5 MG/1
0.5 TABLET ORAL
Refills: 0 | Status: ACTIVE | COMMUNITY

## 2019-01-15 NOTE — HISTORY OF PRESENT ILLNESS
[FreeTextEntry1] : 77 yo F with PMH of HTH, CAD, MI s/p cardiac stents on ASA/Plavix who presents with left foot non-healing wound. Patient states she had a small wound of unknown etiology about 1 year ago that never healed. Office biopsy by Dr. Agee was initially benign. 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 Patient presents to discuss excision and wound closure. \par \par Of note, patient had recent MI in November s/p 2 cardiac stents on Plavix/ASA after ORIF of right femur following a fall. \par \par Currently at [facility].

## 2019-01-15 NOTE — REASON FOR VISIT
[Initial Evaluation] : an initial evaluation [Family Member] : family member [FreeTextEntry1] : Dr. Agee

## 2019-01-15 NOTE — DATA REVIEWED
[FreeTextEntry1] : Pathology 11/12/18- left ankle- atypical keratinocytic proliferation, differential SSC, keratoacanthoma

## 2019-01-15 NOTE — PHYSICAL EXAM
[de-identified] : well-appearing pleasant female, no distress [de-identified] : NC/AT [de-identified] : PERRL [de-identified] : AFSANEHR [de-identified] : soft, nontender  [de-identified] : 3.5 x 2 cm healing biopsy site left dorsal foot, hyperemic changes, 2+ edema, intact distal pulses

## 2019-01-15 NOTE — ASSESSMENT
[FreeTextEntry1] : 77 yo F with left foot atypical keratinocytic proliferation r/o SCC/keratoacanthoma with recent MI s/p cardiac stents on Plavix/ASA high risk for cardiac complications. \par \par - recommend office excision with local wound care pending final pathology

## 2019-01-16 ENCOUNTER — OUTPATIENT (OUTPATIENT)
Dept: OUTPATIENT SERVICES | Facility: HOSPITAL | Age: 79
LOS: 1 days | Discharge: HOME | End: 2019-01-16

## 2019-01-17 DIAGNOSIS — D64.9 ANEMIA, UNSPECIFIED: ICD-10-CM

## 2019-01-23 ENCOUNTER — OUTPATIENT (OUTPATIENT)
Dept: OUTPATIENT SERVICES | Facility: HOSPITAL | Age: 79
LOS: 1 days | Discharge: HOME | End: 2019-01-23

## 2019-01-23 DIAGNOSIS — R79.9 ABNORMAL FINDING OF BLOOD CHEMISTRY, UNSPECIFIED: ICD-10-CM

## 2019-01-30 ENCOUNTER — OUTPATIENT (OUTPATIENT)
Dept: OUTPATIENT SERVICES | Facility: HOSPITAL | Age: 79
LOS: 1 days | Discharge: HOME | End: 2019-01-30

## 2019-01-30 DIAGNOSIS — R79.89 OTHER SPECIFIED ABNORMAL FINDINGS OF BLOOD CHEMISTRY: ICD-10-CM

## 2019-02-06 ENCOUNTER — OUTPATIENT (OUTPATIENT)
Dept: OUTPATIENT SERVICES | Facility: HOSPITAL | Age: 79
LOS: 1 days | Discharge: HOME | End: 2019-02-06

## 2019-02-06 DIAGNOSIS — R79.9 ABNORMAL FINDING OF BLOOD CHEMISTRY, UNSPECIFIED: ICD-10-CM

## 2019-02-13 ENCOUNTER — OUTPATIENT (OUTPATIENT)
Dept: OUTPATIENT SERVICES | Facility: HOSPITAL | Age: 79
LOS: 1 days | Discharge: HOME | End: 2019-02-13

## 2019-02-15 ENCOUNTER — APPOINTMENT (OUTPATIENT)
Dept: PLASTIC SURGERY | Facility: CLINIC | Age: 79
End: 2019-02-15
Payer: MEDICARE

## 2019-02-15 PROCEDURE — 99024 POSTOP FOLLOW-UP VISIT: CPT

## 2019-02-15 PROCEDURE — 11404 EXC TR-EXT B9+MARG 3.1-4 CM: CPT

## 2019-02-20 NOTE — PROCEDURE
[FreeTextEntry6] : Patient is a 78 year old female with a previously biopsied left ankle  lesion measuring approximately 4 x 3 cm.  \par \par The area was prepped and draped in the usual fashion.  Local anesthetic was administered using 1% lidocaine with epinephrine.\par \par Lesion was sharply excised.  Area was irrigated copiously. The wound was left open pending pathology, Sterile bolster dressing was applied.\par \par Patient tolerated procedure well and understands post-op instructions.\par \par Sutures Used:  2-0 Nylon used for bolster dressing\par \par \par \par \par \par

## 2019-02-22 ENCOUNTER — APPOINTMENT (OUTPATIENT)
Dept: PLASTIC SURGERY | Facility: CLINIC | Age: 79
End: 2019-02-22
Payer: MEDICARE

## 2019-02-22 PROCEDURE — 99024 POSTOP FOLLOW-UP VISIT: CPT

## 2019-02-22 RX ORDER — BISMUTH TRIBROMOPH/PETROLATUM 1"X8"
BANDAGE TOPICAL DAILY
Qty: 1 | Refills: 1 | Status: ACTIVE | COMMUNITY
Start: 2019-02-22 | End: 1900-01-01

## 2019-02-22 NOTE — HISTORY OF PRESENT ILLNESS
[FreeTextEntry1] : 77 yo F with PMH of HTH, CAD, MI s/p cardiac stents on ASA/Plavix who presents with left foot non-healing wound. Patient states she had a small wound of unknown etiology about 1 year ago that never healed. Office biopsy by Dr. Agee was initially benign. 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 Patient presents to discuss excision and wound closure. \par \par Of note, patient had recent MI in November s/p 2 cardiac stents on Plavix/ASA after ORIF of right femur following a fall. \par \par Interval hx (2/22/19): Pt presents for f/u- POD #7 s/p excision of left dorsal foot atypical keratinocytic proliferation r/o keratoacanthoma with application of bolster dressing. Pt reports she is feeling well. C/o mild burning pain. Recently discharged from NH and has VNS for wound care.\par  \par

## 2019-02-22 NOTE — PHYSICAL EXAM
[de-identified] : well-appearing, NAD [de-identified] : Left dorsal proximal foot with 4x4cm open wound, granulating well, no significant erythema, chronic swelling unchanged from prior to procedure, no significant drainage, no purulence

## 2019-02-22 NOTE — ASSESSMENT
[FreeTextEntry1] : 77 yo F with left foot atypical keratinocytic proliferation r/o SCC/keratoacanthoma with recent MI s/p cardiac stents on Plavix/ASA high risk for cardiac complications, now POD # 7 s/p Excision of left foot lesion with application of bolster dressing\par -Pathology pending\par -Bolster dressing removed\par -Daily bacitracin/xeroform\par -Leg elevation\par -Tylenol PRN\par -F/u 10-14 days with Dr. Ochoa to review pathology and discuss wound closure

## 2019-03-12 ENCOUNTER — APPOINTMENT (OUTPATIENT)
Dept: PLASTIC SURGERY | Facility: CLINIC | Age: 79
End: 2019-03-12
Payer: MEDICARE

## 2019-03-12 DIAGNOSIS — L85.8 OTHER SPECIFIED EPIDERMAL THICKENING: ICD-10-CM

## 2019-03-12 PROCEDURE — 99212 OFFICE O/P EST SF 10 MIN: CPT

## 2019-03-12 NOTE — PHYSICAL EXAM
[de-identified] : well-appearing, NAD [de-identified] : Left dorsal proximal foot with 1.8 x 2.2 cm open wound, granulating well, no significant erythema, chronic swelling unchanged from prior to procedure, no significant drainage, no purulence

## 2019-03-12 NOTE — HISTORY OF PRESENT ILLNESS
[FreeTextEntry1] : 79 yo F with PMH of HTH, CAD, MI s/p cardiac stents on ASA/Plavix who presents with left foot non-healing wound. Patient states she had a small wound of unknown etiology about 1 year ago that never healed. Office biopsy by Dr. Agee was initially benign. 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 Patient presents to discuss excision and wound closure. \par \par Of note, patient had recent MI in November s/p 2 cardiac stents on Plavix/ASA after ORIF of right femur following a fall. \par \par Interval hx (3/12/19): Pt presents for f/u- 3.5 weeks s/p excision of left dorsal foot atypical keratinocytic proliferation r/o keratoacanthoma with application of bolster dressing. Pt reports she is feeling well. Has VNS for daily wound care.\par  \par

## 2019-03-12 NOTE — DATA REVIEWED
[FreeTextEntry1] : Pathology left ankle 2/15/19 shows scar and changes consistent with previous procedure, no evidence of residual lesion, margins negative, specimen 3 x 2 x 0.4 cm.

## 2019-03-12 NOTE — ASSESSMENT
[FreeTextEntry1] : 79 yo F with left foot atypical keratinocytic proliferation r/o SCC/keratoacanthoma with recent MI s/p cardiac stents on Plavix/ASA high risk for cardiac complications, now 3.5 weeks s/p Excision of left foot lesion with application of bolster dressing\par -Pathology reviewed, no evidence of residual lesion, specimen 3.0 x 2.0 x 0.4 cm\par -Continue daily local wound care

## 2019-04-23 ENCOUNTER — APPOINTMENT (OUTPATIENT)
Dept: PLASTIC SURGERY | Facility: CLINIC | Age: 79
End: 2019-04-23
Payer: MEDICARE

## 2019-04-23 PROCEDURE — 99212 OFFICE O/P EST SF 10 MIN: CPT

## 2019-04-23 NOTE — ASSESSMENT
[FreeTextEntry1] : left ankle fine--wound healed\par daily moisturizer\par to see derm\par f/u w me in 6 months

## 2019-09-04 ENCOUNTER — TRANSCRIPTION ENCOUNTER (OUTPATIENT)
Age: 79
End: 2019-09-04

## 2019-10-22 ENCOUNTER — APPOINTMENT (OUTPATIENT)
Dept: PLASTIC SURGERY | Facility: CLINIC | Age: 79
End: 2019-10-22
Payer: MEDICARE

## 2019-10-22 PROCEDURE — 99212 OFFICE O/P EST SF 10 MIN: CPT

## 2020-02-14 NOTE — CONSULT NOTE ADULT - EYES
Assumed care of patient from Naiv Wilson RN    1561 Assessment completed, patient is alert and oriented x's 4, no c/o pain. NSR on the monitor, lung fields are clear throughout on RA with a nonproductive cough noted. Patient is NPO for possible cardiac cath, ambulating to the BR voiding without any complications. Scheduled medications administered as ordered and patient is currently sitting up on side of bed, no s/s of any pain or distress, call bell and personal belongings within reach, will continue to monitor. 1118 Insulin administered per  protocol. Patient is currently ambulating in the room anticipating Dr. Oneil Fee arrival, no s/s of any pain or distress, will continue to monitor    [id number] patient is currently sitting up on side of bed watching television, no s/s of any pain or distress, will continue to monitor    1600 NAD, will continue to monitor. 1210 [facility] discharge instructions explained to patient, understanding verbalized during answer/question session. Education included medication regime, s/s of when to return for treatment and chest pain care at home.  Patient escorted out of facility via wheelchair in stable condition EOMI; PERRL; no drainage or redness

## 2020-10-20 ENCOUNTER — APPOINTMENT (OUTPATIENT)
Dept: PLASTIC SURGERY | Facility: CLINIC | Age: 80
End: 2020-10-20

## 2020-12-20 NOTE — ED PROVIDER NOTE - ATTENDING CONTRIBUTION TO CARE
stretcher I discussed with patient treatment, need to follow-up as well as reasons to return and they agree.  Copies of results were reviewed with and given to patient to bring to f/u.

## 2021-04-03 ENCOUNTER — TRANSCRIPTION ENCOUNTER (OUTPATIENT)
Age: 81
End: 2021-04-03

## 2021-12-13 NOTE — ED PROVIDER NOTE - CROS ED SKIN ALL NEG
negative... [Hypertension] : ~T hypertension [As Noted in HPI] : as noted in HPI [Negative] : Psychiatric [FreeTextEntry7] : abnormal CT  ABD  focal  wall thickening [FreeTextEntry5] : Hematuria  with  cystoscopy [de-identified] : R/O Pre DM

## 2022-03-31 ENCOUNTER — INPATIENT (INPATIENT)
Facility: HOSPITAL | Age: 82
LOS: 1 days | Discharge: HOME | End: 2022-04-02
Attending: INTERNAL MEDICINE | Admitting: INTERNAL MEDICINE
Payer: MEDICARE

## 2022-03-31 VITALS
DIASTOLIC BLOOD PRESSURE: 65 MMHG | HEIGHT: 67 IN | SYSTOLIC BLOOD PRESSURE: 141 MMHG | TEMPERATURE: 98 F | HEART RATE: 79 BPM | WEIGHT: 181.44 LBS | RESPIRATION RATE: 18 BRPM | OXYGEN SATURATION: 95 %

## 2022-03-31 LAB
ALBUMIN SERPL ELPH-MCNC: 4 G/DL — SIGNIFICANT CHANGE UP (ref 3.5–5.2)
ALP SERPL-CCNC: 129 U/L — HIGH (ref 30–115)
ALT FLD-CCNC: 11 U/L — SIGNIFICANT CHANGE UP (ref 0–41)
ANION GAP SERPL CALC-SCNC: 12 MMOL/L — SIGNIFICANT CHANGE UP (ref 7–14)
AST SERPL-CCNC: 16 U/L — SIGNIFICANT CHANGE UP (ref 0–41)
BASOPHILS # BLD AUTO: 0.05 K/UL — SIGNIFICANT CHANGE UP (ref 0–0.2)
BASOPHILS NFR BLD AUTO: 0.4 % — SIGNIFICANT CHANGE UP (ref 0–1)
BILIRUB SERPL-MCNC: 0.3 MG/DL — SIGNIFICANT CHANGE UP (ref 0.2–1.2)
BUN SERPL-MCNC: 37 MG/DL — HIGH (ref 10–20)
CALCIUM SERPL-MCNC: 9.3 MG/DL — SIGNIFICANT CHANGE UP (ref 8.5–10.1)
CHLORIDE SERPL-SCNC: 105 MMOL/L — SIGNIFICANT CHANGE UP (ref 98–110)
CO2 SERPL-SCNC: 23 MMOL/L — SIGNIFICANT CHANGE UP (ref 17–32)
CREAT SERPL-MCNC: 1 MG/DL — SIGNIFICANT CHANGE UP (ref 0.7–1.5)
D DIMER BLD IA.RAPID-MCNC: 289 NG/ML DDU — HIGH (ref 0–230)
EGFR: 57 ML/MIN/1.73M2 — LOW
EOSINOPHIL # BLD AUTO: 0.16 K/UL — SIGNIFICANT CHANGE UP (ref 0–0.7)
EOSINOPHIL NFR BLD AUTO: 1.4 % — SIGNIFICANT CHANGE UP (ref 0–8)
GLUCOSE SERPL-MCNC: 111 MG/DL — HIGH (ref 70–99)
HCT VFR BLD CALC: 35.5 % — LOW (ref 37–47)
HGB BLD-MCNC: 11.5 G/DL — LOW (ref 12–16)
IMM GRANULOCYTES NFR BLD AUTO: 0.4 % — HIGH (ref 0.1–0.3)
LYMPHOCYTES # BLD AUTO: 1.32 K/UL — SIGNIFICANT CHANGE UP (ref 1.2–3.4)
LYMPHOCYTES # BLD AUTO: 11.7 % — LOW (ref 20.5–51.1)
MCHC RBC-ENTMCNC: 30.2 PG — SIGNIFICANT CHANGE UP (ref 27–31)
MCHC RBC-ENTMCNC: 32.4 G/DL — SIGNIFICANT CHANGE UP (ref 32–37)
MCV RBC AUTO: 93.2 FL — SIGNIFICANT CHANGE UP (ref 81–99)
MONOCYTES # BLD AUTO: 1.07 K/UL — HIGH (ref 0.1–0.6)
MONOCYTES NFR BLD AUTO: 9.5 % — HIGH (ref 1.7–9.3)
NEUTROPHILS # BLD AUTO: 8.66 K/UL — HIGH (ref 1.4–6.5)
NEUTROPHILS NFR BLD AUTO: 76.6 % — HIGH (ref 42.2–75.2)
NRBC # BLD: 0 /100 WBCS — SIGNIFICANT CHANGE UP (ref 0–0)
PLATELET # BLD AUTO: 351 K/UL — SIGNIFICANT CHANGE UP (ref 130–400)
POTASSIUM SERPL-MCNC: 4 MMOL/L — SIGNIFICANT CHANGE UP (ref 3.5–5)
POTASSIUM SERPL-SCNC: 4 MMOL/L — SIGNIFICANT CHANGE UP (ref 3.5–5)
PROT SERPL-MCNC: 7.5 G/DL — SIGNIFICANT CHANGE UP (ref 6–8)
RBC # BLD: 3.81 M/UL — LOW (ref 4.2–5.4)
RBC # FLD: 13.3 % — SIGNIFICANT CHANGE UP (ref 11.5–14.5)
SARS-COV-2 RNA SPEC QL NAA+PROBE: SIGNIFICANT CHANGE UP
SODIUM SERPL-SCNC: 140 MMOL/L — SIGNIFICANT CHANGE UP (ref 135–146)
TROPONIN T SERPL-MCNC: <0.01 NG/ML — SIGNIFICANT CHANGE UP
TROPONIN T SERPL-MCNC: <0.01 NG/ML — SIGNIFICANT CHANGE UP
WBC # BLD: 11.3 K/UL — HIGH (ref 4.8–10.8)
WBC # FLD AUTO: 11.3 K/UL — HIGH (ref 4.8–10.8)

## 2022-03-31 PROCEDURE — 93018 CV STRESS TEST I&R ONLY: CPT

## 2022-03-31 PROCEDURE — 99236 HOSP IP/OBS SAME DATE HI 85: CPT | Mod: FS

## 2022-03-31 PROCEDURE — 71045 X-RAY EXAM CHEST 1 VIEW: CPT | Mod: 26

## 2022-03-31 PROCEDURE — 93010 ELECTROCARDIOGRAM REPORT: CPT

## 2022-03-31 PROCEDURE — 71275 CT ANGIOGRAPHY CHEST: CPT | Mod: 26,MA

## 2022-03-31 PROCEDURE — 93016 CV STRESS TEST SUPVJ ONLY: CPT

## 2022-03-31 PROCEDURE — 78452 HT MUSCLE IMAGE SPECT MULT: CPT | Mod: 26,MA

## 2022-03-31 PROCEDURE — 99223 1ST HOSP IP/OBS HIGH 75: CPT

## 2022-03-31 RX ORDER — ASPIRIN/CALCIUM CARB/MAGNESIUM 324 MG
81 TABLET ORAL DAILY
Refills: 0 | Status: DISCONTINUED | OUTPATIENT
Start: 2022-03-31 | End: 2022-04-02

## 2022-03-31 RX ORDER — HYDROCHLOROTHIAZIDE 25 MG
25 TABLET ORAL DAILY
Refills: 0 | Status: DISCONTINUED | OUTPATIENT
Start: 2022-03-31 | End: 2022-04-02

## 2022-03-31 RX ORDER — AMLODIPINE BESYLATE 2.5 MG/1
10 TABLET ORAL DAILY
Refills: 0 | Status: DISCONTINUED | OUTPATIENT
Start: 2022-03-31 | End: 2022-04-02

## 2022-03-31 RX ORDER — HEPARIN SODIUM 5000 [USP'U]/ML
5000 INJECTION INTRAVENOUS; SUBCUTANEOUS EVERY 12 HOURS
Refills: 0 | Status: DISCONTINUED | OUTPATIENT
Start: 2022-03-31 | End: 2022-04-01

## 2022-03-31 RX ORDER — PREGABALIN 225 MG/1
1000 CAPSULE ORAL DAILY
Refills: 0 | Status: DISCONTINUED | OUTPATIENT
Start: 2022-03-31 | End: 2022-04-02

## 2022-03-31 RX ORDER — ESCITALOPRAM OXALATE 10 MG/1
10 TABLET, FILM COATED ORAL DAILY
Refills: 0 | Status: DISCONTINUED | OUTPATIENT
Start: 2022-03-31 | End: 2022-04-02

## 2022-03-31 RX ORDER — CHLORHEXIDINE GLUCONATE 213 G/1000ML
1 SOLUTION TOPICAL DAILY
Refills: 0 | Status: DISCONTINUED | OUTPATIENT
Start: 2022-03-31 | End: 2022-04-02

## 2022-03-31 RX ORDER — ADENOSINE 3 MG/ML
60 INJECTION INTRAVENOUS ONCE
Refills: 0 | Status: COMPLETED | OUTPATIENT
Start: 2022-03-31 | End: 2022-03-31

## 2022-03-31 RX ORDER — ATORVASTATIN CALCIUM 80 MG/1
80 TABLET, FILM COATED ORAL AT BEDTIME
Refills: 0 | Status: DISCONTINUED | OUTPATIENT
Start: 2022-03-31 | End: 2022-04-02

## 2022-03-31 RX ORDER — ACETAMINOPHEN 500 MG
650 TABLET ORAL EVERY 6 HOURS
Refills: 0 | Status: DISCONTINUED | OUTPATIENT
Start: 2022-03-31 | End: 2022-04-02

## 2022-03-31 RX ORDER — LOSARTAN POTASSIUM 100 MG/1
100 TABLET, FILM COATED ORAL DAILY
Refills: 0 | Status: DISCONTINUED | OUTPATIENT
Start: 2022-03-31 | End: 2022-04-02

## 2022-03-31 RX ORDER — METOPROLOL TARTRATE 50 MG
25 TABLET ORAL
Refills: 0 | Status: DISCONTINUED | OUTPATIENT
Start: 2022-03-31 | End: 2022-04-02

## 2022-03-31 RX ADMIN — ADENOSINE 600 MILLIGRAM(S): 3 INJECTION INTRAVENOUS at 13:59

## 2022-03-31 RX ADMIN — ATORVASTATIN CALCIUM 80 MILLIGRAM(S): 80 TABLET, FILM COATED ORAL at 22:26

## 2022-03-31 NOTE — ED CDU PROVIDER DISPOSITION NOTE - ADMIT DISPOSITION PRESENT ON ADMISSION SEPSIS
Cough, Chronic, Uncertain Cause (Adult)    Everyone has had a cough as part of the common cold, flu, or bronchitis. This kind of cough occurs along with an achy feeling, low-grade fever, nasal and sinus congestion, and a scratchy or sore throat. This usually gets better in 2 to 3 weeks. A cough that lasts longer than 3 weeks may be due to other causes.  If your cough does not improve over the next 2 weeks, further testing may be needed. Follow up with your healthcare provider as advised. Cough suppressants may be recommended. Based on your exam today, the exact cause of your cough is not certain. Below are some common causes for persistent cough.  Smokers cough  Smokers cough doesnt go away. If you continue to smoke, it only gets worse. The cough is from irritation in the air passages. Talk to your healthcare provider about quitting. Medicines or nicotine-replacement products, like gum or the patch, may make quitting easier.  Postnasal drip  A cough that is worse at night may be due to postnasal drip. Excess mucus in the nose drains from the back of your nose to your throat. This triggers the cough reflex. Postnasal drip may be due to a sinus infection or allergy. Common allergens include dust, tobacco smoke (both inhaled and secondhand smoke), environmental pollutants, pollen, mold, pets, cleaning agents, room deodorizers, and chemical fumes. Over-the-counter antihistamines or decongestants may be helpful for allergies. A sinus infection may requires antibiotic treatment. See your healthcare provider if symptoms continue.  Medicines  Certain prescribed medicines can cause a chronic cough in some people:  · ACE inhibitors for high blood pressure. These include benazepril, captopril, enalapril, fosinopril, lisinopril, quinapril, ramipril, and others.  · Beta-blockers for high blood pressure and other conditions. These include propranolol, atenolol, metoprolol, nadolol, and others.  Let your healthcare provider  know if you are taking any of these.  Asthma  Cough may be the only sign of mild asthma. You may have tests to find out if asthma is causing your cough. You may also take asthma medicine on a trial basis.  Acid reflux (heartburn, GERD)  The esophagus is the tube that carries food from the mouth to the stomach. A valve at its lower end prevents stomach acids from flowing upward. If this valve does not work properly, acid from the stomach enters the esophagus. This may cause a burning pain in the upper abdomen or lower chest, belching, or cough. Symptoms are often worse when lying flat. Avoid eating or drinking before bedtime. Try using extra pillows to raise your upper body, or place 4-inch blocks under the head of your bed. You may try an over-the-counter antacid or an acid-blocking medicine such as famotidine, cimetidine, ranitidine, esomeprazole, lansoprazole, or omeprazole. Stronger medicines for this condition can be prescribed by your healthcare provider.  Follow-up care  Follow up with your healthcare provider, or as advised, if your cough does not improve. Further testing may be needed.  Note: If an X-ray was taken, a specialist will review it. You will be notified of any new findings that may affect your care.  When to seek medical advice  Call your healthcare provider right away if any of these occur:  · Mild wheezing or difficulty breathing  · Fever of 100.4ºF (38ºC) or higher, or as directed by your healthcare provider  · Unexpected weight loss  · Coughing up large amounts of colored sputum  · Night sweats (sheets and pajamas get soaking wet)  Call 911, or get immediate medical care  Contact emergency services right away if any of these occur:  · Coughing up blood  · Moderate to severe trouble breathing or wheezing  Date Last Reviewed: 9/13/2015  © 5501-9951 [employer]. [address] [place] [address], [place] [address]. All rights reserved. This information is not intended as a substitute for  professional medical care. Always follow your healthcare professional's instructions.        Preventing Vaginal Infection  These steps can help you stay comfortable during treatment of a vaginal infection. They also help prevent vaginal infections in the future.  Keeping a healthy balance  Factors that change the normal balance in the vagina can lead to a vaginal infection. To help keep the balance normal, try these tips:  · Change out of wet bathing suits and damp exercise clothes as soon as possible. Yeast thrive in a warm, moist environment.  · Avoid wearing tight pants. Choose cotton underwear and pantyhose that have a cotton crotch. Cotton keeps you cooler and drier than synthetics.  · Don't douche unless directed by your health care provider. Douching can destroy friendly bacteria and change the vagina's normal balance.  · Wipe from front to back after using the toilet. This prevents bacteria from spreading from the anus to the vulva.  · Wash the vulva with mild, unscented soap or with plain water.  · Wash your diaphragm, spermicide applicators, and sex toys with mild soap and water after use. Dry them thoroughly before putting them away.  · Change tampons often (every 2 hours to 4 hours). Leaving a tampon in for too long may disrupt the balance of vaginal bacteria.  · Avoid vaginal sprays, scented toilet paper and soaps, and deodorant tampons or pads, which can cause vaginal irritation  Staying healthy overall  Good overall health can help you resist infection. To be healthier:  · Help protect yourself from STDs by using latex condoms for intercourse. Ask your health care provider for more information about safer sex.  · Eat a variety of healthy foods.  · Exercise regularly.  · Get enough rest and sleep.  · Maintain a healthy weight. If you need to lose weight, ask your health care provider for advice on how to start.  Date Last Reviewed: 5/18/2015  © 9970-7122 [employer]. [address] [place] [address]  Road, [place] [address]. All rights reserved. This information is not intended as a substitute for professional medical care. Always follow your healthcare professional's instructions.         No

## 2022-03-31 NOTE — H&P ADULT - NSHPPHYSICALEXAM_GEN_ALL_CORE
PHYSICAL EXAM:    General: Not in distress.   Cardio: Regular rate and rhythm. S1, S2 appreciated. no murmurs.  Pulm: Bilateral breath sounds. No wheezing, or rhonchi  Abdomen: Soft, non-tender, non-distended.   Extremities: b/l +1 LE edema  Neuro: AAOX4

## 2022-03-31 NOTE — ED PROVIDER NOTE - CLINICAL SUMMARY MEDICAL DECISION MAKING FREE TEXT BOX
Labs unremarkable aside from elevated d dimer, CTA without PE, shows age indeterminate L lateral rib fx, likely from fall 8 days ago. Patient's pain is not consistent with fracture pain, is not over the area of fracture.    Given known CAD, age, will need continued monitoring, serial trop, consideration of provocative testing.    On reassessment patient recalled that her last nuclear stress test was 1/2021.

## 2022-03-31 NOTE — H&P ADULT - NSHPLABSRESULTS_GEN_ALL_CORE
< from: CT Angio Chest PE Protocol w/ IV Cont (03.31.22 @ 04:51) >    INTERPRETATION:  CLINICAL HISTORY/REASON FOR EXAM: Chest pain.    TECHNIQUE: Multislice helical sections were obtained from the thoracic   inlet to the lung bases during rapid administration of intravenous   contrast. Thin sections were reconstructed through the pulmonary   vasculature. 3D (MIP) reformats obtained.    COMPARISON: None.    FINDINGS:    PULMONARY EMBOLUS: No evidence of acute pulmonary embolism.    LUNGS, PLEURA, AIRWAYS: Trace left pleural effusion. Bilateral   subsegmental atelectasis. Right lower lobe tree-in-bud nodularity, likely   postinflammatory. Central airway patent. No pneumothorax.    THORACIC NODES: Nomediastinal, hilar, supraclavicular, or axillary   lymphadenopathy.    MEDIASTINUM/GREAT VESSELS: No pericardial effusion. Heart size is within   normal limits. The aorta and main pulmonary artery are of normal caliber.    BONES/SOFT TISSUES: Age-indeterminate right 3-5th and left lateral 7th   rib fractures, possibly subacute.    VISUALIZED UPPER ABDOMEN: Postcholecystectomy with biliary dilation.   Bilateral renal cysts.      IMPRESSION:  1. Age-indeterminate right 3-5th and left lateral 7th rib fractures,   possibly subacute.  2. Trace left pleural effusion.  3. No evidence of acute pulmonary embolism.    --- End of Report ---        < end of copied text >

## 2022-03-31 NOTE — ED ADULT NURSE NOTE - OBJECTIVE STATEMENT
patient complaints of chest tightness since tonight.  Patient denies n/v, SOB.  Patient reports she fell last week and hit her head and now has a headache. Patient denies LOC, being on blood thinners.

## 2022-03-31 NOTE — H&P ADULT - ATTENDING COMMENTS
80 YO F with a PMH of HTN, HLD, severe OA s/p b/l knee/hip replacement, and CAD s/p stent placement who presents to the hospital with a c/o CP for the past x 1 day. Described as sharp, central, non-radiating, and waxes/wanes. - worse with exertion. Associated with - SOB, - nausea, - palpitations, and - diaphoresis. Did not take SLN or ASA prior to arrival. Denies any fevers/chills, cough, ABD pain, LE swelling, dysuria, headaches, or rashes.     In the ED, cardiac enzymes were negative and an EKG showed no ischemic changes. NMST showed moderate-severe reversible defect in the inferolateral/inferior wall of the left ventricle consistent with ischemia.    Family hx of early heart disease (-)     Physical exam shows pt in NAD, resting comfortably. VSS, afebrile, not hypoxic on RA. A&Ox3. Neuro exam intact. CTA B/L with no W/C/R. RRR, no M/G/R. ABD is soft and non-tender to palpation, normoactive BSs. LEs without swelling, pulses palpated bilaterally. No rashes. Labs and imaging as above resident note.     Chest pain, atypical, positive stress test, rule out ACS. Admit to tele. Cardio consult. Serial cardiac enzymes and EKGs. A1c, Lipids, and TSH. Echo. PRN pain meds. Restart home meds.     Age-indeterminate right/left rib fractures s/p recent mechanical fall. PRN pain meds. Incentive spirometry.     Normocytic anemia, unknown baseline. Pt denies bleeding symptoms. Replace as necessary.     HX of HTN, HLD, severe OA s/p b/l knee/hip replacement, and CAD s/p stent. Restart home meds, except as stated above. DVT PPX. Inform PCP of pt's admission to hospital. My note supersedes the residents note.     Date seen by Attending: 3/31/22

## 2022-03-31 NOTE — ED PROVIDER NOTE - PHYSICAL EXAMINATION
VITAL SIGNS: I have reviewed nursing notes and confirm.  CONSTITUTIONAL: Well-developed; well-nourished; in no acute distress.  SKIN: Skin exam is warm and dry, no acute rash.  HEAD: Normocephalic; atraumatic.  EYES: PERRL, EOM intact; conjunctiva and sclera clear.  ENT: No nasal discharge; airway clear  NECK: Supple; non tender.  CARD: S1, S2 normal; no murmurs, gallops, or rubs. Regular rate and rhythm.  RESP: No wheezes, rales or rhonchi.  ABD: Normal bowel sounds; soft; non-distended; non-tender  EXT: Normal ROM. LE pitting edema to upper shin, chronic per patient and daughter  NEURO: Alert, oriented. Grossly unremarkable. No focal deficits.  PSYCH: Cooperative, appropriate.

## 2022-03-31 NOTE — ED CDU PROVIDER INITIAL DAY NOTE - ATTENDING CONTRIBUTION TO CARE
81-year-old female history of hypertension dyslipidemia osteoarthritis CAD with stents here for evaluation of left-sided chest pain.  Pain described as a pressure-like sensation across the left-sided chest.  Of note the patient did fall proximal week ago hitting the right and left ribs against the ground.  The patient's work-up included labs that were grossly unremarkable however there was a D-dimer that was elevated with a CT angio showing no acute pulmonary embolism with subacute rib fractures consistent with recent fall.  In addition patient had 2 - sets of cardiac enzymes and EKGs that showed no evidence of ischemia.  The fall was 1 week ago with the patient having no neurological symptoms and therefore there is no need for CT at this current time.  The plan is for nuclear stress test today.

## 2022-03-31 NOTE — H&P ADULT - ASSESSMENT
80 y/o F PMHx CAD s/p 2 stents, HTN, HLD, severe OA s/p b/l knee and hip replacement presented to the ED for evaluation of chest pain of 1 day duration.     # Chest pain, ACS vs rib fracture s/p fall  # H/o CAD s/p 2 stents in 2018 (bare-metal in mid LAD and prox circumflex)  - non cardiac per description  - EKG no ischemic changes  - trops negative x2  - CTA negative for PE but showed Rt 3-5th and Lt 7th subacute rib fractures (pt had fall last week)  - NST 3/31: showed moderate-severe reversible defect in the inferolateral/inferior wall of the left ventricle consistent with ischemia.  - tele monitoring  - serial CE and EKGs  - NPO for possible cath in am  - c/w ASA 81 mg daily (pt was previously on plavix too)  - c/w lopressor 25 mg BID  - c/w losartan 100 mg daily  - c/w lipitor 80 mg daily  - check 2D echo (EF 50-55% in 2018)  - cardio consult Dr. Rodrigues    # HTN  - c/w amlodipine 10, losartan 100 and HCTZ 25    # Misc  DVT ppx: heparin  GI ppx: not indicated  Diet: Regular, DASH  Activity: IAT  Code status: Full  Dispo: admit to tele

## 2022-03-31 NOTE — H&P ADULT - HISTORY OF PRESENT ILLNESS
82 y/o F PMHx CAD s/p 2 stents, HTN, HLD, severe OA s/p b/l knee and hip replacement presented to the ED for evaluation of chest pain of 1 day duration.   Pt states that yesterday at 9 pm she developed throbbing chest pain in upper chest, non radiating, not related to exertion, exacerbated by inspiration with no associated SOB, dizziness or palpitations.  Of note, pt had a mechanical fall a week ago, she leaned back in a chair and fell onto carpeted floor hitting the left side of her head and her left ribs on the ground.    She had no LOC, was ambulatory and drove immediately after. Notes that for a few days after the fall she had tenderness over her ribs on the left and also over her head but those symptoms have resolved.    In the ED, /65, HR 79, temp 98.1 and spO2b 95% on RA. EKG with no ischemic changes. CTA negative for PE but showed Rt 3-5th rib and 7th Lt subacute ribs fractures. Trops negative x2. However NST done and showed Moderate-severe reversible defect in the inferolateral/inferior wall of the left ventricle consistent with ischemia.

## 2022-03-31 NOTE — ED PROVIDER NOTE - OBJECTIVE STATEMENT
81-year-old female with history of CAD status post stent x2 in 2019 by Dr. Rodrigues, HTN, HLD, severe OA, presents to the ED for assessment of chest pain.  Patient notes that around 9 PM she developed pressure-like chest pain across the upper chest.  Pain worse with breathing.  Not related to exertion.  No associated dyspnea, nausea, vomiting, dizziness.    No recent travel or sick contacts.  Patient is seen Dr. Rodrigues recently but has not had any provocative cardiac testing recently.    Of note, patient also states that about 1 week ago she leaned back in a chair and fell onto carpeted floor hitting the left side of her head and her left ribs on the ground.  She had no LOC, was ambulatory and drove immediately after.  Notes that for a few days she had tenderness over her lateral ribs on the left and also with palpation over her head but those symptoms have resolved.

## 2022-03-31 NOTE — ED CDU PROVIDER INITIAL DAY NOTE - PHYSICAL EXAMINATION
PHYSICAL EXAM:    GENERAL: Alert, appears stated age, well appearing, non-toxic  SKIN: Warm, pink and dry. MMM.   HEAD: NC, AT  EYE: Normal lids/conjunctiva  ENT: Normal hearing, patent oropharynx   NECK: +supple. No meningismus, or JVD  Pulm: Bilateral BS, normal resp effort, no wheezes, stridor, or retractions  CV: RRR, no M/R/G, 2+and = radial pulses  Abd: soft, non-tender, non-distended  Mskel: no erythema, cyanosis, edema. no calf tenderness  Neuro: AAOx3, moving extremities

## 2022-03-31 NOTE — ED CDU PROVIDER INITIAL DAY NOTE - OBJECTIVE STATEMENT
81-year-old female with PMH HTN, HLD, osteoarthritis, CAD s/p PCI with 2 stents (2019), recent fall 1 week ago hitting left ribs/L head without work-up presents with constant moderate pressure-like chest pain across upper chest x 9pm.   Palliating/provoking factors.  No shortness of breath, fever, cough, congestion, abdominal pain, nausea, vomiting, diaphoresis.   Last stress test 1/21 and WNL.   Cardio Tamburrino  In ED was found to have 4 age-indeterminate left lateral rib fractures, not a location of her chest pain.

## 2022-03-31 NOTE — ED CDU PROVIDER INITIAL DAY NOTE - PROGRESS NOTE DETAILS
Received sign out from ROHINI Thomson. Patient's second troponin negative. She is pending nuclear stress test today.

## 2022-03-31 NOTE — ED CDU PROVIDER INITIAL DAY NOTE - NSICDXPASTMEDICALHX_GEN_ALL_CORE_FT
PAST MEDICAL HISTORY:  Arthritis     H/O knee surgery     History of hip surgery     Hypertension     Mitral valve disorder     Other irritable bowel syndrome

## 2022-03-31 NOTE — ED ADULT TRIAGE NOTE - CHIEF COMPLAINT QUOTE
I fell last week, I hit my head. Today I'm having pain in my chest, it started early this evening, its really like a tightness in my chest - patient   Patient denies LOC, denies anticoagulants, denies nausea or SOB

## 2022-03-31 NOTE — ED CDU PROVIDER INITIAL DAY NOTE - NS ED ATTENDING STATEMENT MOD
This was a shared visit with the ESTEBAN. I reviewed and verified the documentation and independently performed the documented:

## 2022-03-31 NOTE — ED CDU PROVIDER DISPOSITION NOTE - CLINICAL COURSE
pt presented to ed for eval of chest pain. pt placed in edou fir further eval. Pt with workup including labs wnl, including 2 sets of negative cardiac enzymes, 2 ekg with no ischemic changes, normal cxray, negative PE study, Nuclear stress test showed  Moderate-severe reversible defect in the inferolateral/inferior wall of the left ventricle consistent with ischemia. pt admitted to tele for further eval

## 2022-04-01 ENCOUNTER — TRANSCRIPTION ENCOUNTER (OUTPATIENT)
Age: 82
End: 2022-04-01

## 2022-04-01 LAB
A1C WITH ESTIMATED AVERAGE GLUCOSE RESULT: 5.6 % — SIGNIFICANT CHANGE UP (ref 4–5.6)
ANION GAP SERPL CALC-SCNC: 15 MMOL/L — HIGH (ref 7–14)
BASOPHILS # BLD AUTO: 0.05 K/UL — SIGNIFICANT CHANGE UP (ref 0–0.2)
BASOPHILS NFR BLD AUTO: 0.6 % — SIGNIFICANT CHANGE UP (ref 0–1)
BUN SERPL-MCNC: 26 MG/DL — HIGH (ref 10–20)
CALCIUM SERPL-MCNC: 8.9 MG/DL — SIGNIFICANT CHANGE UP (ref 8.5–10.1)
CHLORIDE SERPL-SCNC: 103 MMOL/L — SIGNIFICANT CHANGE UP (ref 98–110)
CHOLEST SERPL-MCNC: 98 MG/DL — SIGNIFICANT CHANGE UP
CO2 SERPL-SCNC: 24 MMOL/L — SIGNIFICANT CHANGE UP (ref 17–32)
CREAT SERPL-MCNC: 1.1 MG/DL — SIGNIFICANT CHANGE UP (ref 0.7–1.5)
EGFR: 51 ML/MIN/1.73M2 — LOW
EOSINOPHIL # BLD AUTO: 0.09 K/UL — SIGNIFICANT CHANGE UP (ref 0–0.7)
EOSINOPHIL NFR BLD AUTO: 1.1 % — SIGNIFICANT CHANGE UP (ref 0–8)
ESTIMATED AVERAGE GLUCOSE: 114 MG/DL — SIGNIFICANT CHANGE UP (ref 68–114)
GLUCOSE SERPL-MCNC: 105 MG/DL — HIGH (ref 70–99)
HCT VFR BLD CALC: 31.9 % — LOW (ref 37–47)
HDLC SERPL-MCNC: 53 MG/DL — SIGNIFICANT CHANGE UP
HGB BLD-MCNC: 10.4 G/DL — LOW (ref 12–16)
IMM GRANULOCYTES NFR BLD AUTO: 0.3 % — SIGNIFICANT CHANGE UP (ref 0.1–0.3)
LIPID PNL WITH DIRECT LDL SERPL: 39 MG/DL — SIGNIFICANT CHANGE UP
LYMPHOCYTES # BLD AUTO: 1.25 K/UL — SIGNIFICANT CHANGE UP (ref 1.2–3.4)
LYMPHOCYTES # BLD AUTO: 15.7 % — LOW (ref 20.5–51.1)
MAGNESIUM SERPL-MCNC: 1.9 MG/DL — SIGNIFICANT CHANGE UP (ref 1.8–2.4)
MCHC RBC-ENTMCNC: 30.4 PG — SIGNIFICANT CHANGE UP (ref 27–31)
MCHC RBC-ENTMCNC: 32.6 G/DL — SIGNIFICANT CHANGE UP (ref 32–37)
MCV RBC AUTO: 93.3 FL — SIGNIFICANT CHANGE UP (ref 81–99)
MONOCYTES # BLD AUTO: 0.89 K/UL — HIGH (ref 0.1–0.6)
MONOCYTES NFR BLD AUTO: 11.2 % — HIGH (ref 1.7–9.3)
NEUTROPHILS # BLD AUTO: 5.68 K/UL — SIGNIFICANT CHANGE UP (ref 1.4–6.5)
NEUTROPHILS NFR BLD AUTO: 71.1 % — SIGNIFICANT CHANGE UP (ref 42.2–75.2)
NON HDL CHOLESTEROL: 45 MG/DL — SIGNIFICANT CHANGE UP
NRBC # BLD: 0 /100 WBCS — SIGNIFICANT CHANGE UP (ref 0–0)
PLATELET # BLD AUTO: 350 K/UL — SIGNIFICANT CHANGE UP (ref 130–400)
POTASSIUM SERPL-MCNC: 3.6 MMOL/L — SIGNIFICANT CHANGE UP (ref 3.5–5)
POTASSIUM SERPL-SCNC: 3.6 MMOL/L — SIGNIFICANT CHANGE UP (ref 3.5–5)
RBC # BLD: 3.42 M/UL — LOW (ref 4.2–5.4)
RBC # FLD: 13.7 % — SIGNIFICANT CHANGE UP (ref 11.5–14.5)
SODIUM SERPL-SCNC: 142 MMOL/L — SIGNIFICANT CHANGE UP (ref 135–146)
TRIGL SERPL-MCNC: 61 MG/DL — SIGNIFICANT CHANGE UP
TROPONIN T SERPL-MCNC: <0.01 NG/ML — SIGNIFICANT CHANGE UP
TSH SERPL-MCNC: 1.76 UIU/ML — SIGNIFICANT CHANGE UP (ref 0.27–4.2)
WBC # BLD: 7.98 K/UL — SIGNIFICANT CHANGE UP (ref 4.8–10.8)
WBC # FLD AUTO: 7.98 K/UL — SIGNIFICANT CHANGE UP (ref 4.8–10.8)

## 2022-04-01 PROCEDURE — 93306 TTE W/DOPPLER COMPLETE: CPT | Mod: 26

## 2022-04-01 RX ORDER — ENOXAPARIN SODIUM 100 MG/ML
40 INJECTION SUBCUTANEOUS EVERY 24 HOURS
Refills: 0 | Status: DISCONTINUED | OUTPATIENT
Start: 2022-04-01 | End: 2022-04-02

## 2022-04-01 RX ADMIN — LOSARTAN POTASSIUM 100 MILLIGRAM(S): 100 TABLET, FILM COATED ORAL at 06:04

## 2022-04-01 RX ADMIN — Medication 25 MILLIGRAM(S): at 06:04

## 2022-04-01 RX ADMIN — PREGABALIN 1000 MICROGRAM(S): 225 CAPSULE ORAL at 11:21

## 2022-04-01 RX ADMIN — Medication 650 MILLIGRAM(S): at 11:22

## 2022-04-01 RX ADMIN — ESCITALOPRAM OXALATE 10 MILLIGRAM(S): 10 TABLET, FILM COATED ORAL at 11:21

## 2022-04-01 RX ADMIN — Medication 25 MILLIGRAM(S): at 18:24

## 2022-04-01 RX ADMIN — AMLODIPINE BESYLATE 10 MILLIGRAM(S): 2.5 TABLET ORAL at 06:03

## 2022-04-01 RX ADMIN — Medication 81 MILLIGRAM(S): at 14:18

## 2022-04-01 NOTE — DISCHARGE NOTE PROVIDER - NSDCMRMEDTOKEN_GEN_ALL_CORE_FT
amLODIPine 10 mg oral tablet: 1 tab(s) orally once a day  Aspir 81 oral delayed release tablet: 1 tab(s) orally once a day  atorvastatin 80 mg oral tablet: 1 tab(s) orally once a day (at bedtime)  escitalopram 10 mg oral tablet: 1 tab(s) orally once a day  losartan-hydrochlorothiazide 100 mg-25 mg oral tablet: 1 tab(s) orally once a day  metoprolol tartrate 25 mg oral tablet: 1 tab(s) orally 2 times a day  Vitamin B12 1000 mcg oral tablet: 1 tab(s) orally once a day  Vitamin D3 125 mcg (5000 intl units) oral tablet: 1 tab(s) orally once a day

## 2022-04-01 NOTE — PHYSICAL THERAPY INITIAL EVALUATION ADULT - SPECIFY REASON(S)
Pt currently off unit for catheterization, will f/u as appropriate.
840 am Pt currently has bed rest orders, will notify MD in the unit, if cleared for out of bed activity.

## 2022-04-01 NOTE — DISCHARGE NOTE PROVIDER - CARE PROVIDER_API CALL
Hubert Rodrigues (MD)  Cardiovascular Disease; Interventional Cardiology  [address]  [place] [address]  Phone: [phone]  Fax: [phone]  Follow Up Time: 2 weeks

## 2022-04-01 NOTE — CONSULT NOTE ADULT - ASSESSMENT
Atypical chest pain  h/o CAD s/p PCI to LAD and LCx    - false positive stress test  - non obstructive CAD on cath today  - continue current cardiac meds  - can be discharged from a cardiac standpoint.

## 2022-04-01 NOTE — PROGRESS NOTE ADULT - SUBJECTIVE AND OBJECTIVE BOX
Location: [facility] (3COVF) 011 A ([facility] F9 (3COVF))  Patient Name: AGUSTÍN TURCIOS  Age: 81y  Gender: Female      Progress Note  This morning patient was seen and examined at bedside.    Today is hospital day 1d.  Ms. Turcios is doing fine.   She reports she had a good night sleep.   She denies chest discomfort, SOB, palpitations, diaphoresis, nausea, or vomiting.   Her appetite is adequate but she is kept NPO, and she denies nausea or vomiting.   She denies any abdominal pain, diarrhea, or constipation.  She is ambulating.   She is voiding freely and denies urinary symptoms (dysuria, urgency, frequency, intermittence).      Vital Signs in the last 24 hours   Vitals Summary T(C): 37.3 (04-01-22 @ 04:18), Max: 37.3 (04-01-22 @ 04:18)  HR: 89 (04-01-22 @ 04:18) (80 - 89)  BP: 129/62 (04-01-22 @ 04:18) (129/62 - 162/72)  RR: 18 (04-01-22 @ 04:18) (18 - 18)  SpO2: 96% (03-31-22 @ 22:06) (95% - 96%)  Vent Data   Intake/ Output       Physical Exam  * General Appearance: Alert, cooperative, interactive, well-groomed, oriented to time, place, and person, in no acute distress  * Head: Normocephalic, without obvious abnormality, atraumatic  * Eyes: PERRL, conjunctiva/corneas clear, EOM's intact, fundi benign, both eyes  * Ears: Normal TM's and external ear canals bilaterally  * Nose: Nares normal, septum midline, mucosa normal, no drainage or sinus tenderness  * Throat: Lips, mucosa, and tongue normal; teeth and gums normal  * Lungs: Respirations unlabored, Good bilateral air entry, normal breath sounds (Clear to auscultation bilaterally, no audible wheezes, crackles, or rhonchi)  * Heart: Regular Rate and Rhythm, normal S1 and S2, no audible murmur, rub, or gallop  * Abdomen: Symmetric, non-distended, no scar, soft, non-tender, bowel sounds active all four quadrants, no masses, no organomegaly (no hepatosplenomegaly)  * Extremities: Extremities normal, atraumatic, no cyanosis, no lower extremity pitting edema bilaterally, adequate dorsalis pedis pulses  * Pulses: 2+ and symmetric all extremities  * Skin: Skin color, texture, turgor normal, no rashes or lesions  * Lymph nodes: Cervical, supraclavicular, and axillary nodes normal  * Neurologic: CNII-XII intact, normal strength, sensation and reflexes throughout      Investigations   Laboratory Workup  - CBC:                        10.4   7.98  )-----------( 350      ( 01 Apr 2022 06:39 )             31.9     - Chemistry:  04-01    142  |  103  |  26<H>  ----------------------------<  105<H>  3.6   |  24  |  1.1    Ca    8.9      01 Apr 2022 06:39  Mg     1.9     04-01    TPro  7.5  /  Alb  4.0  /  TBili  0.3  /  DBili  x   /  AST  16  /  ALT  11  /  AlkPhos  129<H>  03-31    - Cardiac Markers:  CARDIAC MARKERS ( 01 Apr 2022 06:39 )  x     / <0.01 ng/mL / x     / x     / x      CARDIAC MARKERS ( 31 Mar 2022 07:44 )  x     / <0.01 ng/mL / x     / x     / x      CARDIAC MARKERS ( 31 Mar 2022 03:10 )  x     / <0.01 ng/mL / x     / x     / x          Current Medications  Standing Medications  amLODIPine   Tablet 10 milliGRAM(s) Oral daily  aspirin enteric coated 81 milliGRAM(s) Oral daily  atorvastatin 80 milliGRAM(s) Oral at bedtime  chlorhexidine 4% Liquid 1 Application(s) Topical daily  cyanocobalamin 1000 MICROGram(s) Oral daily  enoxaparin Injectable 40 milliGRAM(s) SubCutaneous every 24 hours  escitalopram 10 milliGRAM(s) Oral daily  hydrochlorothiazide 25 milliGRAM(s) Oral daily  losartan 100 milliGRAM(s) Oral daily  metoprolol tartrate 25 milliGRAM(s) Oral two times a day    PRN Medications  acetaminophen     Tablet .. 650 milliGRAM(s) Oral every 6 hours PRN Moderate Pain (4 - 6)    Singles Doses Administered  aDENosine Injectable (ADENOSCAN) 60 milliGRAM(s) IV Bolus once

## 2022-04-01 NOTE — PROGRESS NOTE ADULT - ASSESSMENT
Assessment and Plan  Case of an 81 year old female patient with DL, CAD s/p stents 2018, HTN, and severe bilateral knee OA and hips s/p bilateral hip replacement who presented to the ED on 03/31 for evaluation of chest discomfort associated with no SOB, diaphoresis, light headedness, found to have negative CE and no ischemic changes on ECG, but found to have mod severe reversible defects along inferolateral/inferior LV wall, admitted for cardiac cath and tele monitoring. Currently hemodynamically stable.       Chest Pain in Setting of History of CAD s/p Stents  Rule Out ACS: Unstable Angina  * History of CAD s/p 2 stents in 2018  * Follows with Dr Rodrigues  * Troponin <0.01 x2 03/31 -> 04/01  * CT Angio Chest PE Protocol w/ IV Cont (03.31.22 @ 04:51) Age-indeterminate right 3-5th and left lateral 7th rib fractures,   possibly subacute. Trace left pleural effusion. No evidence of acute pulmonary embolism.    - Cardiology team on board  - Scheduled as add on for cardiac cath 04/01  - Trend CE and telemetry monitoring  - Continue aspirin 81mg QD  - Continue Atorvastatin 80 mg QD  - Continue Lopressor 25mg BID  - Check TTE ordered 03/31  - Hba1c and Lipid profile ordered      Trace Left Pleural Effusion  * Serum Pro-Brain Natriuretic Peptide (11.19.18 @ 14:13)    Serum Pro-Brain Natriuretic Peptide: 2025 pg/mL  * CT Angio Chest PE Protocol w/ IV Cont (03.31.22 @ 04:51) Age-indeterminate right 3-5th and left lateral 7th rib fractures,   possibly subacute. Trace left pleural effusion. No evidence of acute pulmonary embolism.    - Monitor SaO2 on RA  - Continue HCTZ 25mg QD  - Check TTE ordered 03/31      Recent Mechanical Fall  Age Indeterminate Right 3-5th and Left Lateral 7th Rib Fractures  * Slipped while on chair at restaurant last week -> was able to ambulate and drive afterward  * CT Angio Chest PE Protocol w/ IV Cont (03.31.22 @ 04:51) Age-indeterminate right 3-5th and left lateral 7th rib fractures,   possibly subacute. Trace left pleural effusion. No evidence of acute pulmonary embolism.  - Pain control: tylenol 650mg Q6h PRN  - Incentive spirometry  - TSH ordered  - Check orthostatics  - Monitor tele  - PT/OT      Severe OA of bilateral Knees and Hips  S/P Bilateral Hip Replacement  - PT/OT  - Pain control: tylenol 650mg Q6h PRN      Hypertension  * ED /65mmHg  - Monitor BP  - Continue Amlodipine 10mg QD  - Continue HCTZ 25mg QD  - Continue Losartan 100mg QD  - Continue Lopressor 25mg BID      Others  - DVT Prophylaxis: Lovenox 40mg Subcutaneously daily  - GI Prophylaxis: not on Pantoprazole 40mg PO QD  - Diet: DASH/ TLC  - Code Status: Full      Barriers to learning: NO  Discharge Planning: Patient will be discharged once stable   Plan was communicated with patient and medical team       Della Vega MD  PGY - 2 Internal Medicine   [facility]

## 2022-04-01 NOTE — CHART NOTE - NSCHARTNOTEFT_GEN_A_CORE
PRE-OP DIAGNOSIS: Chest pain, positive stress test    PROCEDURE:     [x] Coronary Angiogram     [x] [facility]     PHYSICIAN: Dr. Angulo    FELLOW: Dr. Vargas, Dr. Caldwell    PROCEDURE DESCRIPTION:     Consent:      [X] Patient      Anesthesia:     [X] Sedation     [X] local anesthesia     Access & Closure:     [x] 6 Fr Femoral Artery s/p perclose    IV Contrast: 40cc    FINDINGS:   Coronary Dominance: Right    LM: no disease    LAD: minor luminal irregularities diffusely, mid LAD discrete 20% stenosis before prior stent. Prior stent patent.  Diag: no disease    CX: patent prox LCX to distal LCx stent  OM: luminal irregularities    RCA: mRCA 30% tubular stenosis  RPDA: no disease    LVEDP: normal     ESTIMATED BLOOD LOSS: < 10 mL      CONDITION:     [X] Good     [] Fair     [] Critical      POST-OP DIAGNOSIS:      [x] Non-obstructive CAD, prior stents in LAD and LCx patent.     PLAN OF CARE:     [x] Return to In-patient bed. DC same day    [x] Aggressive medical management    [x] IV Fluids: NS at 100cc/hr    Jaden Caldwell PGY4

## 2022-04-01 NOTE — CONSULT NOTE ADULT - SUBJECTIVE AND OBJECTIVE BOX
Date of Admission:3/31/22    CHIEF COMPLAINT: chest pain    HISTORY OF PRESENT ILLNESS: 81yFemale with PMH below presented to the hospital for chest pain of one day duration. has history of PCI with Dr. Rodrigues in the past, presents after positive stress test. Chest pain is atypical and now resolved. Patient underwent cardic cath today and was without obstructive CAD.     PAST MEDICAL & SURGICAL HISTORY:  Hypertension    History of hip surgery    H/O knee surgery    Other irritable bowel syndrome    Mitral valve disorder    Arthritis    No significant past surgical history        FAMILY HISTORY:  [ ] no pertinent family history of premature cardiovascular disease in first degree relatives.  Mother:   Father:   Siblings:     SOCIAL HISTORY:    [x ] Non-smoker  [ ] Smoker  [ ] Alcohol    Allergies    penicillin (Rash; Anaphylaxis; Hives)    Intolerances    	    REVIEW OF SYSTEMS:  CONSTITUTIONAL: No fever, weight loss, or fatigue  CARDIOLOGY: see HPI  RESPIRATORY: see HPI  NEUROLOGICAL: NO weakness, no focal deficits to report.  ENDOCRINOLOGICAL: no recent change in diabetic medications.   GI: no BRBPR, no N,V,diarrhea.    PSYCHIATRY: normal mood and affect  HEENT: no nasal discharge, no ecchymosis  SKIN: no ecchymosis, no breakdown  MUSCULOSKELETAL: Full range of motion x4.     PHYSICAL EXAM:  T(C): 37.3 (04-01-22 @ 04:18), Max: 37.3 (04-01-22 @ 04:18)  HR: 89 (04-01-22 @ 04:18) (80 - 89)  BP: 129/62 (04-01-22 @ 04:18) (129/62 - 159/70)  RR: 18 (04-01-22 @ 04:18) (18 - 18)  SpO2: 96% (03-31-22 @ 22:06) (96% - 96%)  Wt(kg): --  I&O's Summary      General Appearance: well appearing, normal for age and gender. 	  Neck: normal JVP, no bruit.   Eyes: No xanthomalasia, Extra Ocular muscles intact.   Cardiovascular: regular rate and rhythm S1 S2, No JVD, No murmurs, No edema  Respiratory: Lungs clear to auscultation	  Psychiatry: Alert and oriented x 3, Mood & affect appropriate  Gastrointestinal:  Soft, Non-tender  Skin/Integumen: No rashes, No ecchymoses, No cyanosis	  Neurologic: Non-focal  Musculoskeletal/ extremities: Normal range of motion, No clubbing, cyanosis or edema  Vascular: Peripheral pulses palpable 2+ bilaterally    LABS:	 	                          10.4   7.98  )-----------( 350      ( 01 Apr 2022 06:39 )             31.9     04-01    142  |  103  |  26<H>  ----------------------------<  105<H>  3.6   |  24  |  1.1    Ca    8.9      01 Apr 2022 06:39  Mg     1.9     04-01    TPro  7.5  /  Alb  4.0  /  TBili  0.3  /  DBili  x   /  AST  16  /  ALT  11  /  AlkPhos  129<H>  03-31    CARDIAC MARKERS ( 01 Apr 2022 06:39 )  x     / <0.01 ng/mL / x     / x     / x      CARDIAC MARKERS ( 31 Mar 2022 07:44 )  x     / <0.01 ng/mL / x     / x     / x      CARDIAC MARKERS ( 31 Mar 2022 03:10 )  x     / <0.01 ng/mL / x     / x     / x              CARDIAC MARKERS:            TELEMETRY EVENTS: 	    ECG:  < from: 12 Lead ECG (03.31.22 @ 08:00) >  Diagnosis Line Normal sinus rhythm  Normal ECG    < end of copied text >  	  RADIOLOGY:  OTHER: 	    PREVIOUS DIAGNOSTIC TESTING:    [x ] Echocardiogram: < from: TTE Echo Complete w/o Contrast w/ Doppler (04.01.22 @ 13:08) >   1. LV Ejection Fraction by Stanton's Method with a biplane EF of 67 %.   2. Normal global left ventricular systolic function.   3. Normal right ventricular size and function.   4. Mildly enlarged left atrium.   5. Normal right atrial size.   6. Mild tricuspid regurgitation.   7. Estimated pulmonary artery systolic pressure is 45.5 mmHg assuming a   right atrial pressure of 3 mmHg, which is consistent with mild pulmonary   hypertension.   8. Patient is in normal sinus rhythm.    < end of copied text >    [ ]  Catheterization:  [x ] Stress Test: < from: NM Nuclear Stress Pharmacologic Multiple (03.31.22 @ 15:58) >  1.  Moderate-severe reversible defect in the inferolateral/inferior wall   of the left ventricle consistent with ischemia.  2.  Normal global left ventricular wall motion. Incomplete thickening of   the inferolateral/inferior wall raising the possibility of hibernating   myocardium.  3.  Left ventricular ejection fraction calculated as 66% which is within   the range of normal.    < end of copied text >    	    Home Medications:  amLODIPine 10 mg oral tablet: 1 tab(s) orally once a day (31 Mar 2022 21:36)  Aspir 81 oral delayed release tablet: 1 tab(s) orally once a day (31 Mar 2022 21:36)  atorvastatin 80 mg oral tablet: 1 tab(s) orally once a day (at bedtime) (31 Mar 2022 21:36)  escitalopram 10 mg oral tablet: 1 tab(s) orally once a day (31 Mar 2022 21:36)  losartan-hydrochlorothiazide 100 mg-25 mg oral tablet: 1 tab(s) orally once a day (31 Mar 2022 21:36)  metoprolol tartrate 25 mg oral tablet: 1 tab(s) orally 2 times a day (31 Mar 2022 21:36)  Vitamin B12 1000 mcg oral tablet: 1 tab(s) orally once a day (31 Mar 2022 21:36)  Vitamin D3 125 mcg (5000 intl units) oral tablet: 1 tab(s) orally once a day (31 Mar 2022 21:36)    MEDICATIONS  (STANDING):  amLODIPine   Tablet 10 milliGRAM(s) Oral daily  aspirin enteric coated 81 milliGRAM(s) Oral daily  atorvastatin 80 milliGRAM(s) Oral at bedtime  chlorhexidine 4% Liquid 1 Application(s) Topical daily  cyanocobalamin 1000 MICROGram(s) Oral daily  enoxaparin Injectable 40 milliGRAM(s) SubCutaneous every 24 hours  escitalopram 10 milliGRAM(s) Oral daily  hydrochlorothiazide 25 milliGRAM(s) Oral daily  losartan 100 milliGRAM(s) Oral daily  metoprolol tartrate 25 milliGRAM(s) Oral two times a day    MEDICATIONS  (PRN):  acetaminophen     Tablet .. 650 milliGRAM(s) Oral every 6 hours PRN Moderate Pain (4 - 6)

## 2022-04-01 NOTE — DISCHARGE NOTE NURSING/CASE MANAGEMENT/SOCIAL WORK - PATIENT PORTAL LINK FT
You can access the FollowMyHealth Patient Portal offered by [facility] by registering at the following website: [email or web address]. By joining [employer]’s FollowMyHealth portal, you will also be able to view your health information using other applications (apps) compatible with our system.

## 2022-04-01 NOTE — DISCHARGE NOTE PROVIDER - HOSPITAL COURSE
Ms Shoemaker is an 81 year old female patient with DL, CAD s/p stents 2018, HTN, and severe bilateral knee OA and hips s/p bilateral hip replacement who presented to the ED on 03/31 for evaluation of chest discomfort associated with no SOB, diaphoresis, light headedness, found to have negative CE and no ischemic changes on ECG, but found to have mod severe reversible defects along inferolateral/inferior LV wall, admitted for cardiac cath and tele monitoring. Please refer to below for more details:       Chest Pain in Setting of History of CAD s/p Stents  Rule Out ACS: Unstable Angina  - Troponin <0.01 x2 03/31 -> 04/01  - CT Angio Chest PE Protocol w/ IV Cont (03.31.22 @ 04:51) Age-indeterminate right 3-5th and left lateral 7th rib fractures,   possibly subacute. Trace left pleural effusion. No evidence of acute pulmonary embolism.  - Cardiology team on board  - She went for cardiac cath 04/01 THAT REVEALED ...  - On discharge  --> Will ask patient to follow up with Dr Rodrigues in 2 weeks  --> Will ask patient to continue aspirin 81mg QD, Atorvastatin 80 mg QD, and Lopressor 25mg BID      Trace Left Pleural Effusion  - Serum Pro-Brain Natriuretic Peptide (11.19.18 @ 14:13)    Serum Pro-Brain Natriuretic Peptide: 2025 pg/mL  - CT Angio Chest PE Protocol w/ IV Cont (03.31.22 @ 04:51) Age-indeterminate right 3-5th and left lateral 7th rib fractures,   possibly subacute. Trace left pleural effusion. No evidence of acute pulmonary embolism.  - On discharge  --> Will ask patient to continue HCTZ 25mg QD      Recent Mechanical Fall  Age Indeterminate Right 3-5th and Left Lateral 7th Rib Fractures  - Slipped while on chair at restaurant last week -> was able to ambulate and drive afterward  - CT Angio Chest PE Protocol w/ IV Cont (03.31.22 @ 04:51) Age-indeterminate right 3-5th and left lateral 7th rib fractures,   possibly subacute. Trace left pleural effusion. No evidence of acute pulmonary embolism.      Severe OA of bilateral Knees and Hips  S/P Bilateral Hip Replacement  - On discharge  --> Will ask patient to take tylenol 650mg Q6h PRN      Hypertension  -  On discharge  --> Will ask patient to monitor BP closely  --> Will ask patient to continue anti hTN as prescribed    Ms Shoemaker is an 81 year old female patient with DL, CAD s/p stents 2018, HTN, and severe bilateral knee OA and hips s/p bilateral hip replacement who presented to the ED on 03/31 for evaluation of chest discomfort associated with no SOB, diaphoresis, light headedness, found to have negative CE and no ischemic changes on ECG, but found to have mod severe reversible defects along inferolateral/inferior LV wall, admitted for cardiac cath and tele monitoring. Please refer to below for more details:       Chest Pain in Setting of History of CAD s/p Stents  Rule Out ACS: Unstable Angina  - Troponin <0.01 x2 03/31 -> 04/01  - CT Angio Chest PE Protocol w/ IV Cont (03.31.22 @ 04:51) Age-indeterminate right 3-5th and left lateral 7th rib fractures,   possibly subacute. Trace left pleural effusion. No evidence of acute pulmonary embolism.  - Cardiology team on board  - She went for cardiac cath 04/01 THAT REVEALED ...  - On discharge  --> Will ask patient to follow up with Dr Rodrigues in 2 weeks  --> Will ask patient to continue aspirin 81mg QD, Atorvastatin 80 mg QD, and Lopressor 25mg BID      Trace Left Pleural Effusion  - Serum Pro-Brain Natriuretic Peptide (11.19.18 @ 14:13)    Serum Pro-Brain Natriuretic Peptide: 2025 pg/mL  - CT Angio Chest PE Protocol w/ IV Cont (03.31.22 @ 04:51) Age-indeterminate right 3-5th and left lateral 7th rib fractures,   possibly subacute. Trace left pleural effusion. No evidence of acute pulmonary embolism.  - On discharge  --> Will ask patient to continue HCTZ 25mg QD      Recent Mechanical Fall  Age Indeterminate Right 3-5th and Left Lateral 7th Rib Fractures  - Slipped while on chair at restaurant last week -> was able to ambulate and drive afterward  - CT Angio Chest PE Protocol w/ IV Cont (03.31.22 @ 04:51) Age-indeterminate right 3-5th and left lateral 7th rib fractures,   possibly subacute. Trace left pleural effusion. No evidence of acute pulmonary embolism.      Severe OA of bilateral Knees and Hips  S/P Bilateral Hip Replacement  - On discharge  --> Will ask patient to take tylenol 650mg Q6h PRN      Hypertension  -  On discharge  --> Will ask patient to monitor BP closely  --> Will ask patient to continue anti hTN as prescribed     35 minutes spent on discharge planning.

## 2022-04-01 NOTE — PROGRESS NOTE ADULT - TIME BILLING
#Chest pain  described as midsternal, radiating to epigastrum  ekg twi iii; noncontinugous  ce neg  NST with reversible defect; inf-lat  tentative plan for [facility]  f/u cards  tte    #Progress Note Handoff:  Pending (specify):  Consults_________, Tests________, Test Results_______, Other___lhc______  Family discussion: d/w pt at bedside re: treatment plan, primary dx  Disposition: Home___/SNF___/Other________/Unknown at this time_____x__

## 2022-04-02 VITALS
SYSTOLIC BLOOD PRESSURE: 109 MMHG | DIASTOLIC BLOOD PRESSURE: 61 MMHG | TEMPERATURE: 97 F | HEART RATE: 78 BPM | RESPIRATION RATE: 19 BRPM

## 2022-04-02 PROCEDURE — 99239 HOSP IP/OBS DSCHRG MGMT >30: CPT

## 2022-04-06 DIAGNOSIS — Z96.643 PRESENCE OF ARTIFICIAL HIP JOINT, BILATERAL: ICD-10-CM

## 2022-04-06 DIAGNOSIS — I10 ESSENTIAL (PRIMARY) HYPERTENSION: ICD-10-CM

## 2022-04-06 DIAGNOSIS — Z88.0 ALLERGY STATUS TO PENICILLIN: ICD-10-CM

## 2022-04-06 DIAGNOSIS — E78.5 HYPERLIPIDEMIA, UNSPECIFIED: ICD-10-CM

## 2022-04-06 DIAGNOSIS — R07.9 CHEST PAIN, UNSPECIFIED: ICD-10-CM

## 2022-04-06 DIAGNOSIS — Z95.5 PRESENCE OF CORONARY ANGIOPLASTY IMPLANT AND GRAFT: ICD-10-CM

## 2022-04-06 DIAGNOSIS — R07.89 OTHER CHEST PAIN: ICD-10-CM
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
## 2022-04-06 DIAGNOSIS — S22.43XA MULTIPLE FRACTURES OF RIBS, BILATERAL, INITIAL ENCOUNTER FOR CLOSED FRACTURE: ICD-10-CM
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
## 2022-04-06 DIAGNOSIS — Z20.822 CONTACT WITH AND (SUSPECTED) EXPOSURE TO COVID-19: ICD-10-CM

## 2022-04-06 DIAGNOSIS — M17.0 BILATERAL PRIMARY OSTEOARTHRITIS OF KNEE: ICD-10-CM

## 2022-04-06 DIAGNOSIS — Z79.82 LONG TERM (CURRENT) USE OF ASPIRIN: ICD-10-CM

## 2022-04-06 DIAGNOSIS — I25.10 ATHEROSCLEROTIC HEART DISEASE OF NATIVE CORONARY ARTERY WITHOUT ANGINA PECTORIS: ICD-10-CM

## 2022-04-06 DIAGNOSIS — Z87.891 PERSONAL HISTORY OF NICOTINE DEPENDENCE: ICD-10-CM

## 2022-04-06 DIAGNOSIS — J90 PLEURAL EFFUSION, NOT ELSEWHERE CLASSIFIED: ICD-10-CM
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
## 2022-06-09 NOTE — ED ADULT NURSE NOTE - CAS EDP DISCH DISPOSITION ADMI
Siri Cuadra Patient Age: 39 year old  MESSAGE:   Received fax from [facility] stating they were unable to reach out to patient to schedule Dupixent shipment.     Please have patient call [phone]    Left message on answering machine to call back along with hours of operation.  Mychart sent.     Electronically signed by: Nel Sweeney RN,BSN Allergy, Derm, Med Specialties Supervisor       WEIGHT AND HEIGHT:   Wt Readings from Last 1 Encounters:   05/21/22 86.2 kg (190 lb)     Ht Readings from Last 1 Encounters:   05/21/22 5' 4\" (1.626 m)     BMI Readings from Last 1 Encounters:   05/21/22 32.61 kg/m²       ALLERGIES:  Sulfa antibiotics  Current Outpatient Medications   Medication   • fluticasone-vilanterol (Breo Ellipta) 100-25 MCG/INH inhaler   • azelastine (ASTELIN) 0.1 % nasal spray   • budesonide (Pulmicort) 0.5 MG/2ML nebulizer suspension   • acetaminophen-codeine (TYLENOL NO.3) 300-30 MG per tablet   • Dupixent 300 MG/2ML Solution Pen-injector   • Aczone 7.5 % gel   • montelukast (SINGULAIR) 10 MG tablet   • albuterol (ProAir HFA) 108 (90 Base) MCG/ACT inhaler   • levocetirizine (XYZAL) 5 MG tablet     Current Facility-Administered Medications   Medication   • acetaminophen-codeine (TYLENOL NO.3) 300-30 MG per tablet 1 tablet     PHARMACY to use:           Pharmacy preference(s) on file:   [facility]pharm[facility] #[id number] - [place], [place] - [address]  [address]  [facility] [address]  Phone: [phone] Fax: [phone]    [facility] - [place], [place] - [address]  [address]  [address]  [facility] [address]  Phone: [phone] Fax: [phone]      CALL BACK INFO: DO NOT LEAVE A MESSAGE - contact patient directly  ROUTING: Patient's physician/staff        PCP: Jc Hyman, DO         INS: Payor: BLUE CROSS [employer]UE [employer]ELD [place] / Plan: PPO [id number] / Product Type: PPO MISC   PATIENT ADDRESS:  [address]  [id number]     CCU

## 2022-07-12 NOTE — ED ADULT NURSE NOTE - NS ED NURSE RECORD ANOTHER HT AND WT
Subjective:   Patria Wagner is 49year old female s/p Lap Libia on 6/29. She is doing well, denies nausea/vomiting, constipation/diarrhea, fevers/chills. Her pain is well controlled. She had some mild nausea, but is doing better now. Objective:   Visit Vitals  BP (!) 142/97 (BP Location: LUE - Left upper extremity)   Pulse 89   Temp 98 Â°F (36.7 Â°C)         Physical Exam  Constitutional:       Appearance: Normal appearance. She is well-developed. Abdominal:      General: Bowel sounds are normal.      Palpations: Abdomen is soft. Tenderness: There is no abdominal tenderness. Comments: Laparoscopic Incisions clean, dry and intact         Pathology:  Benign  Discussed with the patient    Assessment and Plan:   Problem List Items Addressed This Visit        Gastrointestinal and Abdominal    Calculus of gallbladder without cholecystitis without obstruction - Primary      Other Visit Diagnoses     Postop check            Patria Wagner is 49year old female s/p Lap Libia on 6/29. She is recovering well from surgery. Feels well at this time. She will continue to refrain from lifting over 25 pounds for the next 2-3 weeks. May return to clinic as needed.     William Jacobsen MD  Minimally Invasive and Hepatobiliary Surgery    CC:  Jeremiah Quispe MD, Sunil (GI) Yes

## 2022-07-31 NOTE — DISCHARGE NOTE NURSING/CASE MANAGEMENT/SOCIAL WORK - NSDCPETBCESMAN_GEN_ALL_CORE
If you are a smoker, it is important for your health to stop smoking. Please be aware that second hand smoke is also harmful. of note, impaired recitation of days of the week in English, intact in Syriac->daughter unable to state if this is pt's baseline/intact

## 2022-08-18 NOTE — ED PROVIDER NOTE - NS ED ATTENDING STATEMENT MOD
I have personally performed a face to face diagnostic evaluation on this patient. I have reviewed the ACP note and agree with the history, exam and plan of care, except as noted. Ivermectin Counseling:  Patient instructed to take medication on an empty stomach with a full glass of water.  Patient informed of potential adverse effects including but not limited to nausea, diarrhea, dizziness, itching, and swelling of the extremities or lymph nodes.  The patient verbalized understanding of the proper use and possible adverse effects of ivermectin.  All of the patient's questions and concerns were addressed.

## 2023-01-27 NOTE — ED PROVIDER NOTE - PROGRESS NOTE DETAILS
[facility] [phone] trop elevated, Dr. Angulo, cardiac fellow aware of EKG - concern for posterior MI.  Dr. Angulo at bedside.  Getting repeat EKG. EKG with same morphology.  Dr. Angulo recommended plavix 600mg and heparin.  He called ortho to make them aware of the situation. Pt to go to CCU, approved by dr. Angulo

## 2023-01-31 NOTE — CONSULT NOTE ADULT - CONSULT REASON
Medical Clearance Spironolactone Counseling: Patient advised regarding risks of diarrhea, abdominal pain, hyperkalemia, birth defects (for female patients), liver toxicity and renal toxicity. The patient may need blood work to monitor liver and kidney function and potassium levels while on therapy. The patient verbalized understanding of the proper use and possible adverse effects of spironolactone.  All of the patient's questions and concerns were addressed.

## 2023-02-28 NOTE — ED ADULT NURSE NOTE - NURSING NEURO LEVEL OF CONSCIOUSNESS
Returned pt call, no answer.   Left detailed message informing pt no labs ordered and last annual fasting labs were completed 9/12/22.     Office number provided for further questions/concerns.    alert and awake

## 2023-03-23 ENCOUNTER — OUTPATIENT (OUTPATIENT)
Dept: INPATIENT UNIT | Facility: HOSPITAL | Age: 83
LOS: 1 days | Discharge: HOME | End: 2023-03-23
Payer: MEDICARE

## 2023-03-23 VITALS
SYSTOLIC BLOOD PRESSURE: 117 MMHG | HEART RATE: 65 BPM | TEMPERATURE: 96 F | DIASTOLIC BLOOD PRESSURE: 61 MMHG | OXYGEN SATURATION: 97 % | HEIGHT: 67 IN | WEIGHT: 169.98 LBS | RESPIRATION RATE: 17 BRPM

## 2023-03-23 VITALS
RESPIRATION RATE: 18 BRPM | OXYGEN SATURATION: 98 % | SYSTOLIC BLOOD PRESSURE: 143 MMHG | HEART RATE: 68 BPM | DIASTOLIC BLOOD PRESSURE: 62 MMHG

## 2023-03-23 DIAGNOSIS — H25.11 AGE-RELATED NUCLEAR CATARACT, RIGHT EYE: ICD-10-CM

## 2023-03-23 DIAGNOSIS — Z98.890 OTHER SPECIFIED POSTPROCEDURAL STATES: Chronic | ICD-10-CM

## 2023-03-23 PROCEDURE — V2632: CPT

## 2023-03-23 NOTE — ASU PATIENT PROFILE, ADULT - NSICDXPASTSURGICALHX_GEN_ALL_CORE_FT
PAST SURGICAL HISTORY:  No significant past surgical history CARDIAC STENTS X2,  BILATERAL KNEE REPLACEMENT,  RIGHT HIP REPLACEMENT,  MARY IN RIGHT FEMUR     PAST SURGICAL HISTORY:  History of surgery BILATERAL KNEE REPLACEMENT,    RIGHT HIP REPLACEMENT,  MARY IN RIGHT FEMUR,  CARDIAC STENTS X2,   CHOLECYSTECTOMY

## 2023-03-23 NOTE — CHART NOTE - NSCHARTNOTEFT_GEN_A_CORE
PACU ANESTHESIA ADMISSION NOTE      Procedure:   Post op diagnosis:      ____  Intubated  TV:______       Rate: ______      FiO2: ______    _x___  Patent Airway    _x___  Full return of protective reflexes    _x___  Full recovery from anesthesia / back to baseline status    Vitals:    BP  123/54  P  63  R 14  Sat  98    Mental Status:  _x___ Awake   _____ Alert   _____ Drowsy   _____ Sedated    Nausea/Vomiting:  _x___  NO       ______Yes,   See Post - Op Orders         Pain Scale (0-10):  __0___    Treatment: _x___ None    ____ See Post - Op/PCA Orders    Post - Operative Fluids:   __x__ Oral   ____ See Post - Op Orders    Plan: Discharge:   _x___Home       _____Floor     _____Critical Care    _____  Other:_________________    Comments:  No anesthesia issues or complications noted.  Discharge when criteria met.

## 2023-03-23 NOTE — ASU DISCHARGE PLAN (ADULT/PEDIATRIC) - NS MD DC FALL RISK RISK
For information on Fall & Injury Prevention, visit: [email or web address] OR  [email or web address] OR  https://www.cdc.gov/steadi/patient.html

## 2023-03-23 NOTE — ASU PATIENT PROFILE, ADULT - URINARY CATHETER
80 yo female with decreased urine output    Pre renal azotemia VS. Drug induced.  No obstructive urologic issue consistent with decreased urine output no

## 2023-03-23 NOTE — ASU PATIENT PROFILE, ADULT - FALL HARM RISK - UNIVERSAL INTERVENTIONS
Bed in lowest position, wheels locked, appropriate side rails in place/Call bell, personal items and telephone in reach/Instruct patient to call for assistance before getting out of bed or chair/Non-slip footwear when patient is out of bed/[place] to call system/Physically safe environment - no spills, clutter or unnecessary equipment/Purposeful Proactive Rounding/Room/bathroom lighting operational, light cord in reach

## 2023-03-28 DIAGNOSIS — H25.89 OTHER AGE-RELATED CATARACT: ICD-10-CM

## 2023-03-28 DIAGNOSIS — I50.9 HEART FAILURE, UNSPECIFIED: ICD-10-CM

## 2023-03-28 DIAGNOSIS — I25.2 OLD MYOCARDIAL INFARCTION: ICD-10-CM

## 2023-03-28 DIAGNOSIS — N18.4 CHRONIC KIDNEY DISEASE, STAGE 4 (SEVERE): ICD-10-CM

## 2023-03-28 DIAGNOSIS — M19.90 UNSPECIFIED OSTEOARTHRITIS, UNSPECIFIED SITE: ICD-10-CM

## 2023-03-28 DIAGNOSIS — M06.9 RHEUMATOID ARTHRITIS, UNSPECIFIED: ICD-10-CM

## 2023-03-28 DIAGNOSIS — I13.0 HYPERTENSIVE HEART AND CHRONIC KIDNEY DISEASE WITH HEART FAILURE AND STAGE 1 THROUGH STAGE 4 CHRONIC KIDNEY DISEASE, OR UNSPECIFIED CHRONIC KIDNEY DISEASE: ICD-10-CM

## 2023-03-28 DIAGNOSIS — Z86.79 PERSONAL HISTORY OF OTHER DISEASES OF THE CIRCULATORY SYSTEM: ICD-10-CM

## 2023-03-28 DIAGNOSIS — Z88.0 ALLERGY STATUS TO PENICILLIN: ICD-10-CM

## 2023-03-28 DIAGNOSIS — Z79.82 LONG TERM (CURRENT) USE OF ASPIRIN: ICD-10-CM

## 2023-03-30 ENCOUNTER — OUTPATIENT (OUTPATIENT)
Dept: INPATIENT UNIT | Facility: HOSPITAL | Age: 83
LOS: 1 days | Discharge: HOME | End: 2023-03-30
Payer: MEDICARE

## 2023-03-30 VITALS — RESPIRATION RATE: 17 BRPM | DIASTOLIC BLOOD PRESSURE: 64 MMHG | SYSTOLIC BLOOD PRESSURE: 141 MMHG | HEART RATE: 66 BPM

## 2023-03-30 VITALS
RESPIRATION RATE: 18 BRPM | TEMPERATURE: 96 F | OXYGEN SATURATION: 96 % | HEART RATE: 59 BPM | DIASTOLIC BLOOD PRESSURE: 64 MMHG | HEIGHT: 67 IN | SYSTOLIC BLOOD PRESSURE: 136 MMHG | WEIGHT: 169.98 LBS

## 2023-03-30 DIAGNOSIS — Z98.890 OTHER SPECIFIED POSTPROCEDURAL STATES: Chronic | ICD-10-CM

## 2023-03-30 DIAGNOSIS — H25.12 AGE-RELATED NUCLEAR CATARACT, LEFT EYE: ICD-10-CM

## 2023-03-30 PROCEDURE — V2632: CPT

## 2023-03-30 RX ORDER — AMLODIPINE BESYLATE 2.5 MG/1
1 TABLET ORAL
Qty: 0 | Refills: 0 | DISCHARGE

## 2023-03-30 RX ORDER — PREGABALIN 225 MG/1
1 CAPSULE ORAL
Qty: 0 | Refills: 0 | DISCHARGE

## 2023-03-30 RX ORDER — CHOLECALCIFEROL (VITAMIN D3) 125 MCG
1 CAPSULE ORAL
Qty: 0 | Refills: 0 | DISCHARGE

## 2023-03-30 RX ORDER — ESCITALOPRAM OXALATE 10 MG/1
1 TABLET, FILM COATED ORAL
Qty: 0 | Refills: 0 | DISCHARGE

## 2023-03-30 RX ORDER — METOPROLOL TARTRATE 50 MG
1 TABLET ORAL
Qty: 0 | Refills: 0 | DISCHARGE

## 2023-03-30 RX ORDER — ASPIRIN/CALCIUM CARB/MAGNESIUM 324 MG
1 TABLET ORAL
Qty: 0 | Refills: 0 | DISCHARGE

## 2023-03-30 RX ORDER — LOSARTAN/HYDROCHLOROTHIAZIDE 100MG-25MG
1 TABLET ORAL
Qty: 0 | Refills: 0 | DISCHARGE

## 2023-03-30 NOTE — ASU PATIENT PROFILE, ADULT - PRO MENTAL HEALTH SX RECENT
Rehabilitation Nursing  Inpatient Rehabilitation Plan of Care Note    Plan of Care  Body Function Structure    Skin Integrity (Active)  Current Status (2/3/2022 12:00:00 AM): free from skin breakdown this shift  Weekly Goal: free from skin breakdown this stay  Discharge Goal: home free of skin breakdown    Safety    Potential for Injury (Active)  Current Status (2/3/2022 12:00:00 AM): free from injury this shift  Weekly Goal: free from injury this stay  Discharge Goal: home free of injury    Signed by: Felicia Lundberg, Supervisor     none

## 2023-03-30 NOTE — ASU PATIENT PROFILE, ADULT - NSICDXPASTSURGICALHX_GEN_ALL_CORE_FT
PAST SURGICAL HISTORY:  History of surgery BILATERAL KNEE REPLACEMENT,    RIGHT HIP REPLACEMENT,  MARY IN RIGHT FEMUR,  CARDIAC STENTS X2,   CHOLECYSTECTOMY, right IOL

## 2023-03-30 NOTE — ASU PATIENT PROFILE, ADULT - INTERNATIONAL TRAVEL
Quality 402: Tobacco Use And Help With Quitting Among Adolescents: Patient screened for tobacco and never smoked Quality 130: Documentation Of Current Medications In The Medical Record: Current Medications Documented Quality 110: Preventive Care And Screening: Influenza Immunization: Influenza Immunization Administered during Influenza season Quality 431: Preventive Care And Screening: Unhealthy Alcohol Use - Screening: Patient not identified as an unhealthy alcohol user when screened for unhealthy alcohol use using a systematic screening method Detail Level: Detailed No

## 2023-03-30 NOTE — ASU PREOP CHECKLIST - INTERNAL PROSTHESES
B/L THR, R TKR, RIGHT IOL, RIGHT FEMUR FX/yes(specify) 2 CARDIAC STENTS, B/L THR, R TKR, RIGHT IOL, RIGHT FEMUR FX/yes(specify)

## 2023-03-30 NOTE — ASU PATIENT PROFILE, ADULT - NSICDXPASTMEDICALHX_GEN_ALL_CORE_FT
PAST MEDICAL HISTORY:  Arthritis     Basal cell carcinoma     H/O knee surgery     History of hip surgery     Hypertension     Mitral valve disorder     Other irritable bowel syndrome

## 2023-03-30 NOTE — ASU PREOP CHECKLIST - HEART RATE (BEATS/MIN)
Pharmacy Tube Feeding Consult    Medication reviewed for administration by feeding tube and for potential food/drug interactions.    Recommendation: No changes are needed at this time.     Pharmacy will continue to follow as new medications are ordered.   59

## 2023-04-03 DIAGNOSIS — K58.8 OTHER IRRITABLE BOWEL SYNDROME: ICD-10-CM

## 2023-04-03 DIAGNOSIS — I25.2 OLD MYOCARDIAL INFARCTION: ICD-10-CM

## 2023-04-03 DIAGNOSIS — N18.4 CHRONIC KIDNEY DISEASE, STAGE 4 (SEVERE): ICD-10-CM

## 2023-04-03 DIAGNOSIS — H25.89 OTHER AGE-RELATED CATARACT: ICD-10-CM

## 2023-04-03 DIAGNOSIS — Z79.82 LONG TERM (CURRENT) USE OF ASPIRIN: ICD-10-CM

## 2023-04-03 DIAGNOSIS — M19.90 UNSPECIFIED OSTEOARTHRITIS, UNSPECIFIED SITE: ICD-10-CM

## 2023-04-03 DIAGNOSIS — Z88.0 ALLERGY STATUS TO PENICILLIN: ICD-10-CM

## 2023-04-03 DIAGNOSIS — I12.9 HYPERTENSIVE CHRONIC KIDNEY DISEASE WITH STAGE 1 THROUGH STAGE 4 CHRONIC KIDNEY DISEASE, OR UNSPECIFIED CHRONIC KIDNEY DISEASE: ICD-10-CM

## 2023-05-12 PROBLEM — C44.91 BASAL CELL CARCINOMA OF SKIN, UNSPECIFIED: Chronic | Status: ACTIVE | Noted: 2023-03-30

## 2023-06-15 ENCOUNTER — APPOINTMENT (OUTPATIENT)
Dept: PLASTIC SURGERY | Facility: CLINIC | Age: 83
End: 2023-06-15
Payer: MEDICARE

## 2023-06-15 VITALS — WEIGHT: 170 LBS | BODY MASS INDEX: 27.32 KG/M2 | HEIGHT: 66 IN

## 2023-06-15 PROCEDURE — 99203 OFFICE O/P NEW LOW 30 MIN: CPT

## 2023-06-15 NOTE — ASSESSMENT
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

## 2023-06-15 NOTE — PHYSICAL EXAM
[de-identified] : well-appearing, NAD [de-identified] : Left dorsal proximal foot with 1.8 x 2.2 cm open wound, granulating well, no significant erythema, chronic swelling unchanged from prior to procedure, no significant drainage, no purulence

## 2023-06-15 NOTE — HISTORY OF PRESENT ILLNESS
[FreeTextEntry1] : 77 yo F with PMH of HTH, CAD, MI s/p cardiac stents on ASA/Plavix who presents with left foot non-healing wound. Patient states she had a small wound of unknown etiology about 1 year ago that never healed. Office biopsy by Dr. Agee was initially benign. 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 Patient presents to discuss excision and wound closure. \par \par Of note, patient had recent MI in November s/p 2 cardiac stents on Plavix/ASA after ORIF of right femur following a fall. \par \par Interval hx (3/12/19): Pt presents for f/u- 3.5 weeks s/p excision of left dorsal foot atypical keratinocytic proliferation r/o keratoacanthoma with application of bolster dressing. Pt reports she is feeling well. Has VNS for daily wound care.\par  \par

## 2023-09-14 NOTE — DISCHARGE NOTE ADULT - PLAN OF CARE
Medical management after cardiac cath Follow up with Dr. Rodrigues cardiologist. Repeat Kidney function test for ESTEFANY And asses starting ACE/ARB  Complete Rehab at NH for physical therapy [place]rel Counseling:  I discussed with the patient the risks of etanercept including but not limited to myelosuppression, immunosuppression, autoimmune hepatitis, demyelinating diseases, lymphoma, and infections.  The patient understands that monitoring is required including a PPD at baseline and must alert us or the primary physician if symptoms of infection or other concerning signs are noted.

## 2023-11-08 NOTE — PRE-OP CHECKLIST - SIDE RAILS UP
Visit Information    Have you changed or started any medications since your last visit including any over-the-counter medicines, vitamins, or herbal medicines? no   Have you stopped taking any of your medications? Is so, why? -  no  Are you having any side effects from any of your medications? - no    Have you seen any other physician or provider since your last visit?  no   Have you had any other diagnostic tests since your last visit?  no   Have you been seen in the emergency room and/or had an admission in a hospital since we last saw you?  no   Have you had your routine dental cleaning in the past 6 months?  no     Do you have an active MyChart account? If no, what is the barrier?   Yes    Patient Care Team:  VIJAYA Rojas CNP as PCP - General (Nurse Practitioner)  VIJAYA Rojas CNP as PCP - Empaneled Provider    Medical History Review  Past Medical, Family, and Social History reviewed and  contribute to the patient presenting condition    Health Maintenance   Topic Date Due    COVID-19 Vaccine (1) Never done    Flu vaccine (1) 09/13/2024 (Originally 8/1/2023)    Depression Screen  09/13/2024    DTaP/Tdap/Td vaccine (2 - Td or Tdap) 04/16/2028    Lipids  09/15/2028    Colorectal Cancer Screen  07/08/2029    Shingles vaccine  Completed    Hepatitis C screen  Completed    HIV screen  Completed    Hepatitis A vaccine  Aged Out    Hepatitis B vaccine  Aged Out    Hib vaccine  Aged Out    Meningococcal (ACWY) vaccine  Aged Out    Pneumococcal 0-64 years Vaccine  Aged Out    Diabetes screen  Discontinued    Prostate Specific Antigen (PSA) Screening or Monitoring  Discontinued done

## 2024-08-16 NOTE — PATIENT PROFILE ADULT - NSPROMEDSBROUGHTTOHOSP_GEN_A_NUR
Duplicate    For Pharmacy Admin Tracking Only    Program: Medication Refill  CPA in place:    Recommendation Provided To:   Intervention Detail: Discontinued Rx: 1, reason: Duplicate Therapy  Intervention Accepted By:   Gap Closed?:    Time Spent (min): 5  
no

## 2024-09-28 ENCOUNTER — INPATIENT (INPATIENT)
Facility: HOSPITAL | Age: 84
LOS: 4 days | Discharge: HOME | DRG: 543 | End: 2024-10-03
Attending: STUDENT IN AN ORGANIZED HEALTH CARE EDUCATION/TRAINING PROGRAM | Admitting: STUDENT IN AN ORGANIZED HEALTH CARE EDUCATION/TRAINING PROGRAM
Payer: MEDICARE

## 2024-09-28 VITALS
HEIGHT: 65 IN | RESPIRATION RATE: 18 BRPM | WEIGHT: 154.98 LBS | DIASTOLIC BLOOD PRESSURE: 77 MMHG | HEART RATE: 91 BPM | SYSTOLIC BLOOD PRESSURE: 139 MMHG | OXYGEN SATURATION: 95 % | TEMPERATURE: 98 F

## 2024-09-28 DIAGNOSIS — Z98.890 OTHER SPECIFIED POSTPROCEDURAL STATES: Chronic | ICD-10-CM

## 2024-09-28 DIAGNOSIS — M54.50 LOW BACK PAIN, UNSPECIFIED: ICD-10-CM

## 2024-09-28 LAB
ALBUMIN SERPL ELPH-MCNC: 3.6 G/DL — SIGNIFICANT CHANGE UP (ref 3.5–5.2)
ALP SERPL-CCNC: 107 U/L — SIGNIFICANT CHANGE UP (ref 30–115)
ALT FLD-CCNC: 18 U/L — SIGNIFICANT CHANGE UP (ref 0–41)
ANION GAP SERPL CALC-SCNC: 12 MMOL/L — SIGNIFICANT CHANGE UP (ref 7–14)
AST SERPL-CCNC: 37 U/L — SIGNIFICANT CHANGE UP (ref 0–41)
BASOPHILS # BLD AUTO: 0.04 K/UL — SIGNIFICANT CHANGE UP (ref 0–0.2)
BASOPHILS NFR BLD AUTO: 0.4 % — SIGNIFICANT CHANGE UP (ref 0–1)
BILIRUB SERPL-MCNC: 0.8 MG/DL — SIGNIFICANT CHANGE UP (ref 0.2–1.2)
BUN SERPL-MCNC: 27 MG/DL — HIGH (ref 10–20)
CALCIUM SERPL-MCNC: 8.6 MG/DL — SIGNIFICANT CHANGE UP (ref 8.4–10.5)
CHLORIDE SERPL-SCNC: 101 MMOL/L — SIGNIFICANT CHANGE UP (ref 98–110)
CO2 SERPL-SCNC: 23 MMOL/L — SIGNIFICANT CHANGE UP (ref 17–32)
CREAT SERPL-MCNC: 1.2 MG/DL — SIGNIFICANT CHANGE UP (ref 0.7–1.5)
EGFR: 45 ML/MIN/1.73M2 — LOW
EOSINOPHIL # BLD AUTO: 0.03 K/UL — SIGNIFICANT CHANGE UP (ref 0–0.7)
EOSINOPHIL NFR BLD AUTO: 0.3 % — SIGNIFICANT CHANGE UP (ref 0–8)
GLUCOSE SERPL-MCNC: 115 MG/DL — HIGH (ref 70–99)
HCT VFR BLD CALC: 34.2 % — LOW (ref 37–47)
HGB BLD-MCNC: 10.9 G/DL — LOW (ref 12–16)
IMM GRANULOCYTES NFR BLD AUTO: 0.3 % — SIGNIFICANT CHANGE UP (ref 0.1–0.3)
LYMPHOCYTES # BLD AUTO: 0.59 K/UL — LOW (ref 1.2–3.4)
LYMPHOCYTES # BLD AUTO: 6.4 % — LOW (ref 20.5–51.1)
MCHC RBC-ENTMCNC: 31.7 PG — HIGH (ref 27–31)
MCHC RBC-ENTMCNC: 31.9 G/DL — LOW (ref 32–37)
MCV RBC AUTO: 99.4 FL — HIGH (ref 81–99)
MONOCYTES # BLD AUTO: 0.29 K/UL — SIGNIFICANT CHANGE UP (ref 0.1–0.6)
MONOCYTES NFR BLD AUTO: 3.1 % — SIGNIFICANT CHANGE UP (ref 1.7–9.3)
NEUTROPHILS # BLD AUTO: 8.28 K/UL — HIGH (ref 1.4–6.5)
NEUTROPHILS NFR BLD AUTO: 89.5 % — HIGH (ref 42.2–75.2)
NRBC # BLD: 0 /100 WBCS — SIGNIFICANT CHANGE UP (ref 0–0)
PLATELET # BLD AUTO: 396 K/UL — SIGNIFICANT CHANGE UP (ref 130–400)
PMV BLD: 9.6 FL — SIGNIFICANT CHANGE UP (ref 7.4–10.4)
POTASSIUM SERPL-MCNC: 5.7 MMOL/L — HIGH (ref 3.5–5)
POTASSIUM SERPL-SCNC: 5.7 MMOL/L — HIGH (ref 3.5–5)
PROT SERPL-MCNC: 7.1 G/DL — SIGNIFICANT CHANGE UP (ref 6–8)
RBC # BLD: 3.44 M/UL — LOW (ref 4.2–5.4)
RBC # FLD: 14.8 % — HIGH (ref 11.5–14.5)
SODIUM SERPL-SCNC: 136 MMOL/L — SIGNIFICANT CHANGE UP (ref 135–146)
WBC # BLD: 9.26 K/UL — SIGNIFICANT CHANGE UP (ref 4.8–10.8)
WBC # FLD AUTO: 9.26 K/UL — SIGNIFICANT CHANGE UP (ref 4.8–10.8)

## 2024-09-28 PROCEDURE — 81003 URINALYSIS AUTO W/O SCOPE: CPT

## 2024-09-28 PROCEDURE — 99285 EMERGENCY DEPT VISIT HI MDM: CPT | Mod: FS

## 2024-09-28 PROCEDURE — 76705 ECHO EXAM OF ABDOMEN: CPT

## 2024-09-28 PROCEDURE — 83970 ASSAY OF PARATHORMONE: CPT

## 2024-09-28 PROCEDURE — 72148 MRI LUMBAR SPINE W/O DYE: CPT | Mod: MC

## 2024-09-28 PROCEDURE — 84134 ASSAY OF PREALBUMIN: CPT

## 2024-09-28 PROCEDURE — 82306 VITAMIN D 25 HYDROXY: CPT

## 2024-09-28 PROCEDURE — 72128 CT CHEST SPINE W/O DYE: CPT | Mod: MC

## 2024-09-28 PROCEDURE — 72131 CT LUMBAR SPINE W/O DYE: CPT | Mod: 26

## 2024-09-28 PROCEDURE — 36415 COLL VENOUS BLD VENIPUNCTURE: CPT

## 2024-09-28 PROCEDURE — 85025 COMPLETE CBC W/AUTO DIFF WBC: CPT

## 2024-09-28 PROCEDURE — 85027 COMPLETE CBC AUTOMATED: CPT

## 2024-09-28 PROCEDURE — 84443 ASSAY THYROID STIM HORMONE: CPT

## 2024-09-28 PROCEDURE — 80053 COMPREHEN METABOLIC PANEL: CPT

## 2024-09-28 PROCEDURE — 83735 ASSAY OF MAGNESIUM: CPT

## 2024-09-28 PROCEDURE — 72131 CT LUMBAR SPINE W/O DYE: CPT | Mod: MC

## 2024-09-28 PROCEDURE — 84100 ASSAY OF PHOSPHORUS: CPT

## 2024-09-28 PROCEDURE — 97164 PT RE-EVAL EST PLAN CARE: CPT | Mod: GP

## 2024-09-28 PROCEDURE — 86140 C-REACTIVE PROTEIN: CPT

## 2024-09-28 PROCEDURE — 72128 CT CHEST SPINE W/O DYE: CPT | Mod: 26

## 2024-09-28 PROCEDURE — 82310 ASSAY OF CALCIUM: CPT

## 2024-09-28 PROCEDURE — 93005 ELECTROCARDIOGRAM TRACING: CPT

## 2024-09-28 PROCEDURE — 72170 X-RAY EXAM OF PELVIS: CPT | Mod: 26

## 2024-09-28 PROCEDURE — 76770 US EXAM ABDO BACK WALL COMP: CPT

## 2024-09-28 PROCEDURE — 97116 GAIT TRAINING THERAPY: CPT | Mod: GP

## 2024-09-28 PROCEDURE — 93970 EXTREMITY STUDY: CPT

## 2024-09-28 PROCEDURE — 85652 RBC SED RATE AUTOMATED: CPT

## 2024-09-28 PROCEDURE — 97162 PT EVAL MOD COMPLEX 30 MIN: CPT | Mod: GP

## 2024-09-28 PROCEDURE — 97165 OT EVAL LOW COMPLEX 30 MIN: CPT | Mod: GO

## 2024-09-28 PROCEDURE — 97110 THERAPEUTIC EXERCISES: CPT | Mod: GP

## 2024-09-28 PROCEDURE — 93010 ELECTROCARDIOGRAM REPORT: CPT

## 2024-09-28 PROCEDURE — 83036 HEMOGLOBIN GLYCOSYLATED A1C: CPT

## 2024-09-28 RX ORDER — LIDOCAINE 50 MG/G
1 CREAM TOPICAL
Qty: 2 | Refills: 0
Start: 2024-09-28 | End: 2024-10-04

## 2024-09-28 RX ORDER — ATORVASTATIN CALCIUM 10 MG/1
80 TABLET, FILM COATED ORAL AT BEDTIME
Refills: 0 | Status: DISCONTINUED | OUTPATIENT
Start: 2024-09-28 | End: 2024-10-03

## 2024-09-28 RX ORDER — 5-HYDROXYTRYPTOPHAN (5-HTP) 100 MG
3 TABLET,DISINTEGRATING ORAL AT BEDTIME
Refills: 0 | Status: DISCONTINUED | OUTPATIENT
Start: 2024-09-28 | End: 2024-10-03

## 2024-09-28 RX ORDER — METOPROLOL TARTRATE 50 MG
25 TABLET ORAL
Refills: 0 | Status: DISCONTINUED | OUTPATIENT
Start: 2024-09-28 | End: 2024-10-03

## 2024-09-28 RX ORDER — PANTOPRAZOLE SODIUM 40 MG/1
40 TABLET, DELAYED RELEASE ORAL
Refills: 0 | Status: DISCONTINUED | OUTPATIENT
Start: 2024-09-28 | End: 2024-10-03

## 2024-09-28 RX ORDER — CYANOCOBALAMIN (VITAMIN B-12) 1000MCG/ML
1000 VIAL (ML) INJECTION DAILY
Refills: 0 | Status: DISCONTINUED | OUTPATIENT
Start: 2024-09-28 | End: 2024-10-03

## 2024-09-28 RX ORDER — AMLODIPINE BESYLATE 5 MG
10 TABLET ORAL DAILY
Refills: 0 | Status: DISCONTINUED | OUTPATIENT
Start: 2024-09-28 | End: 2024-09-30

## 2024-09-28 RX ORDER — ACETAMINOPHEN 325 MG
1000 TABLET ORAL EVERY 8 HOURS
Refills: 0 | Status: DISCONTINUED | OUTPATIENT
Start: 2024-09-28 | End: 2024-10-01

## 2024-09-28 RX ORDER — ONDANSETRON HCL/PF 4 MG/2 ML
4 VIAL (ML) INJECTION ONCE
Refills: 0 | Status: COMPLETED | OUTPATIENT
Start: 2024-09-28 | End: 2024-09-28

## 2024-09-28 RX ORDER — ESCITALOPRAM OXALATE 10 MG
10 TABLET ORAL DAILY
Refills: 0 | Status: DISCONTINUED | OUTPATIENT
Start: 2024-09-28 | End: 2024-10-03

## 2024-09-28 RX ORDER — LIDOCAINE 50 MG/G
1 CREAM TOPICAL ONCE
Refills: 0 | Status: COMPLETED | OUTPATIENT
Start: 2024-09-28 | End: 2024-09-28

## 2024-09-28 RX ORDER — ONDANSETRON HCL/PF 4 MG/2 ML
4 VIAL (ML) INJECTION EVERY 8 HOURS
Refills: 0 | Status: DISCONTINUED | OUTPATIENT
Start: 2024-09-28 | End: 2024-10-03

## 2024-09-28 RX ORDER — OXYCODONE HYDROCHLORIDE 30 MG/1
10 TABLET, FILM COATED, EXTENDED RELEASE ORAL EVERY 8 HOURS
Refills: 0 | Status: DISCONTINUED | OUTPATIENT
Start: 2024-09-28 | End: 2024-10-03

## 2024-09-28 RX ORDER — INFLUENZA VIRUS VACCINE 15; 15; 15; 15 UG/.5ML; UG/.5ML; UG/.5ML; UG/.5ML
0.5 SUSPENSION INTRAMUSCULAR ONCE
Refills: 0 | Status: DISCONTINUED | OUTPATIENT
Start: 2024-09-28 | End: 2024-10-03

## 2024-09-28 RX ORDER — ACETAMINOPHEN 325 MG
975 TABLET ORAL ONCE
Refills: 0 | Status: COMPLETED | OUTPATIENT
Start: 2024-09-28 | End: 2024-09-28

## 2024-09-28 RX ORDER — ASPIRIN 325 MG
81 TABLET ORAL DAILY
Refills: 0 | Status: DISCONTINUED | OUTPATIENT
Start: 2024-09-28 | End: 2024-10-03

## 2024-09-28 RX ORDER — MAG HYDROX/ALUMINUM HYD/SIMETH 200-200-20
30 SUSPENSION, ORAL (FINAL DOSE FORM) ORAL EVERY 4 HOURS
Refills: 0 | Status: DISCONTINUED | OUTPATIENT
Start: 2024-09-28 | End: 2024-10-03

## 2024-09-28 RX ADMIN — Medication 600 MILLIGRAM(S): at 10:39

## 2024-09-28 RX ADMIN — Medication 10 MILLIGRAM(S): at 17:44

## 2024-09-28 RX ADMIN — LIDOCAINE 1 PATCH: 50 CREAM TOPICAL at 10:40

## 2024-09-28 RX ADMIN — LIDOCAINE 1 PATCH: 50 CREAM TOPICAL at 17:08

## 2024-09-28 RX ADMIN — ATORVASTATIN CALCIUM 80 MILLIGRAM(S): 10 TABLET, FILM COATED ORAL at 21:16

## 2024-09-28 RX ADMIN — Medication 975 MILLIGRAM(S): at 10:39

## 2024-09-28 RX ADMIN — Medication 500 MILLIGRAM(S): at 16:27

## 2024-09-28 RX ADMIN — Medication 81 MILLIGRAM(S): at 17:06

## 2024-09-28 RX ADMIN — Medication 750 MILLIGRAM(S): at 10:40

## 2024-09-28 RX ADMIN — Medication 25 MILLIGRAM(S): at 17:07

## 2024-09-28 RX ADMIN — Medication 25 MILLIGRAM(S): at 17:06

## 2024-09-28 RX ADMIN — OXYCODONE HYDROCHLORIDE 10 MILLIGRAM(S): 30 TABLET, FILM COATED, EXTENDED RELEASE ORAL at 17:07

## 2024-09-28 RX ADMIN — Medication 5000 UNIT(S): at 17:07

## 2024-09-28 RX ADMIN — Medication 4 MILLIGRAM(S): at 12:10

## 2024-09-28 RX ADMIN — Medication 1000 MICROGRAM(S): at 17:06

## 2024-09-28 RX ADMIN — OXYCODONE HYDROCHLORIDE 10 MILLIGRAM(S): 30 TABLET, FILM COATED, EXTENDED RELEASE ORAL at 16:26

## 2024-09-28 RX ADMIN — Medication 10 MILLIGRAM(S): at 17:06

## 2024-09-28 RX ADMIN — LIDOCAINE 1 PATCH: 50 CREAM TOPICAL at 22:55

## 2024-09-28 NOTE — ED PROVIDER NOTE - NSFOLLOWUPINSTRUCTIONS_ED_ALL_ED_FT
Our Emergency Department Referral Coordinators will be reaching out to you in the next 24-48 hours from 9:00am to 5:00pm to schedule a follow up appointment. Please expect a phone call from the hospital in that time frame. If you do not receive a call or if you have any questions or concerns, you can reach them at   [phone].     Back Pain    Back pain is very common in adults. The cause of back pain is rarely dangerous and the pain often gets better over time. The cause of your back pain may not be known and may include strain of muscles or ligaments, degeneration of the spinal disks, or arthritis. Occasionally the pain may radiate down your leg(s). Over-the-counter medicines to reduce pain and inflammation are often the most helpful. Stretching and remaining active frequently helps the healing process.     SEEK IMMEDIATE MEDICAL CARE IF YOU HAVE THE FOLLOWING SYMPTOMS: bowel or bladder control problems, unusual weakness or numbness in your arms or legs, nausea or vomiting, abdominal pain, fever, dizziness/lightheadedness.

## 2024-09-28 NOTE — H&P ADULT - NSHPLABSRESULTS_GEN_ALL_CORE
10.9   9.26  )-----------( 396      ( 28 Sep 2024 12:24 )             34.2       09-28    136  |  101  |  27[H]  ----------------------------<  115[H]  5.7[H]   |  23  |  1.2    Ca    8.6      28 Sep 2024 12:24    TPro  7.1  /  Alb  3.6  /  TBili  0.8  /  DBili  x   /  AST  37  /  ALT  18  /  AlkPhos  107  09-28                  Urinalysis Basic - ( 28 Sep 2024 12:24 )    Color: x / Appearance: x / SG: x / pH: x  Gluc: 115 mg/dL / Ketone: x  / Bili: x / Urobili: x   Blood: x / Protein: x / Nitrite: x   Leuk Esterase: x / RBC: x / WBC x   Sq Epi: x / Non Sq Epi: x / Bacteria: x      RADIOLOGY:  < from: Xray Pelvis AP only (09.28.24 @ 10:36) >    ACC: [id number] EXAM:  XR PELVIS AP ONLY 1-2 VIEWS   ORDERED BY: TANNA VYAS     PROCEDURE DATE:  09/28/2024          INTERPRETATION:  CLINICAL INDICATION: Pain    TECHNIQUE: 2 views of the pelvis COMPARISON: 11/19/2018    INTERPRETATION:  Prior total right hip arthroplasty, with less prominent   heterotopic ossification. New partially included metallic plate affixed   to the lateral cortex of the proximal femur by means of several screws.   The imaged hardware is intact. There is no acute fracture. There is no   dislocation. Severe degenerative changes of the left hip and lower lumbar   spine.    IMPRESSION: No acute fracture or dislocation.        --- End of Report ---            GUILLERMO CHINCHILLA MD; Attending Radiologist  This documenthas been electronically signed. Sep 28 2024 10:52AM    < end of copied text >

## 2024-09-28 NOTE — H&P ADULT - NSHPPHYSICALEXAM_GEN_ALL_CORE
T(C): 36.7 (09-28-24 @ 14:06), Max: 36.7 (09-28-24 @ 09:54)  HR: 88 (09-28-24 @ 14:06) (88 - 91)  BP: 131/70 (09-28-24 @ 14:06) (131/70 - 139/77)  RR: 18 (09-28-24 @ 14:06) (18 - 18)  SpO2: 96% (09-28-24 @ 14:06) (95% - 96%)    PHYSICAL EXAM:  GENERAL: NAD, well-developed, well nourished, looks stated age  HEAD:  Atraumatic, Normocephalic  EYES: EOMI, PERRLA, conjunctiva and sclera clear  NECK: Supple, No JVD, no bruits, no masses, no thyroid enlargement  ENMT: No tonsillar erythema, exudated or enlargement, moist mucous membranes  CHEST/LUNG: Clear to auscultation bilaterally; No rales, rhonchi or wheezing, no dullness to percussion  HEART: S1,S2 Regular rate and rhythm; No murmurs, rubs, or gallops  ABDOMEN: Soft, nontender, nondistended, no rebound tenderness; No palpable masses, no hernias, no organomegaly; Bowel sounds present and normoactive  EXTREMITIES:  2+ peripheral pulses bilaterally and symmetrically, no clubbing, cyanosis, or edema  + point tenderness over paraspinal muscles b/l in scaral area, no ecchymosis, edema or palpable mass  LYMPH: No lymphadenopathy  PSYCH: AAOx3, normal mood and affect  NEUROLOGY: non-focal, muscle strength 5/5 all extremities, DTRs 2+ symmetrically  SKIN: No rashes or lesions

## 2024-09-28 NOTE — ED PROVIDER NOTE - PATIENT PORTAL LINK FT
You can access the FollowMyHealth Patient Portal offered by [facility] by registering at the following website: [email or web address]. By joining [facility]’s FollowMyHealth portal, you will also be able to view your health information using other applications (apps) compatible with our system.

## 2024-09-28 NOTE — ED PROVIDER NOTE - PROVIDER TOKENS
PROVIDER:[TOKEN:[[id number]],FOLLOWUP:[1-3 Days]],FREE:[LAST:[Your rheumatologist],PHONE:[(   )    -],FAX:[(   )    -],FOLLOWUP:[1-3 Days]]

## 2024-09-28 NOTE — H&P ADULT - HISTORY OF PRESENT ILLNESS
85 yo female with PMHx HTN, HLD, PUD s/p cauterization, RA/OA, CKD presents with c/o pain across lower back x 1 week. Pt states it started in the L hip but now is across the whole lower back. Pt states it is too painful to walk. Pt states it feels better when sitting or lying down. She denies any trauma or precipitating event. Pt has also had an unintentional weight loss of 10 pounds in the last 2 weeks. Denies fever/chills, malaise or lethargy.

## 2024-09-28 NOTE — PATIENT PROFILE ADULT - FALL HARM RISK - HARM RISK INTERVENTIONS

## 2024-09-28 NOTE — ED ADULT NURSE NOTE - NS ED NURSE LEVEL OF CONSCIOUSNESS ORIENTATION
Quality 431: Preventive Care And Screening: Unhealthy Alcohol Use - Screening: Patient not identified as an unhealthy alcohol user when screened for unhealthy alcohol use using a systematic screening method
Quality 130: Documentation Of Current Medications In The Medical Record: Current Medications Documented
Detail Level: Generalized
Quality 226: Preventive Care And Screening: Tobacco Use: Screening And Cessation Intervention: Patient screened for tobacco use and is an ex/non-smoker
Oriented - self; Oriented - place; Oriented - time

## 2024-09-28 NOTE — H&P ADULT - ASSESSMENT
85 yo female with lower back pain and inability to ambulate          Plan:  1. Lower back pain  - cont Robaxin Q8h  - Oxycodone IR PRN mod- severe pain  - Tylenol 1gm Q8h  - CT T-L-S spine   - PT/OT eval    2. HTN  - monitor BP  - cont meds    3. HLD  - cont meds  - low fat diet    4. RA/OA  - cont Methotrexate weekly    5. Depression  - cont Lexapro    6. PUD  - Protonix daily    7. CAD  - cont ASA    8. DVT proph  - Heparin SQ      All above D/W Dr Izaguirre

## 2024-09-28 NOTE — ED ADULT NURSE NOTE - NS ED NOTE ABUSE SUSPICION NEGLECT YN
INFECTIOUS DISEASE PROGRESS NOTE    ASSESSMENT:    # Soft tissue infection of LLE medial fasciotomy wound s/p bedside debridement of of eschar, necrotic muscle and fascia and evacuation of old hematoma on -now status postdebridement in OR on    - s/p left BKA  # Possible cholecystitis s/p samantha tube placement 12/15-cx NGTD   # Acute nonocclusive thrombus of distal left common iliac artery with extension to left external and external iliac arteries extending to LLE s/p left lower extremity thrombectomy, with 4 compartment fasciotomy on 2022  # Hypoxic respiratory failure, due to volume overload  # Urinary retention now status post Smart catheter placement  # Tqzdsaaskgnw-pgyltntshggbgh-dzzqnh cholecystitis/soft tissue infection of medial fasciotomy wound, big component due to reaction to significant anasarca and volume overload, new IV infiltration  # Esophagitis noted on CT- non specific finding        PLAN:   - WBC stable and he is afebrile   - continue doxycycline day #2 of 7 days, complete through 1/3/23  - continue local wound care, PT wound care following  - follow up with Dr. Garner in 2 weeks     Ok to DC from ID standpoint once medically cleared     Brenda Hayden, BRITTANY  [phone]    -------------------------------------------------------------------------------------------------------------------    SUBJECTIVE:    Patient seen and chart reviewed  Afebrile  No complaints at this time    OBJECTIVE:  Tmax Temp (24hrs), Av °F (36.7 °C), Min:97.7 °F (36.5 °C), Max:98.2 °F (36.8 °C)     Last Vitals   Vitals:    22 0719   BP: 105/71   Pulse: 87   Resp: 16   Temp: 98.1 °F (36.7 °C)       Gen: NAD, awake, alert  HEENT: Anicteric, oropharynx clear  CV: RRR, no murmur  Pulm: Decreased breath sounds bilateral bases  Abd: soft, non-tender, non-distended, positive bowel sounds, cholecystostomy tube with bilious output   : smart with yellow urine   Ext: left BKA site with dressing  in place   Psych: calm, normal mood and affect  Skin: As above    ANTIMICROBIALS  Doxycycline     LAB:  Recent Labs     12/27/22 0512 12/28/22 0458 12/29/22 0458   WBC 10.0 9.3 10.5   HGB 9.8* 10.6* 10.9*   HCT 30.4* 32.6* 33.3*   * 487* 472*   MCV 91.6 91.8 91.0       Recent Labs   Lab 12/29/22 0458 12/28/22 0458 12/27/22 0512   SODIUM 132* 136 135   POTASSIUM 4.1 4.0 4.2   CHLORIDE 100 103 105   CO2 27 29 29   BUN 21* 19 17   CREATININE 0.80 0.97 1.02   GLUCOSE 248* 207* 153*   CALCIUM 8.3* 8.1* 8.1*       Microbiology Results     None            RADIOLOGY: images reviewed       Note:  Assessment and Plan have been moved to the top of the screen for ease of the viewer such that the plan can be seen without scrolling to the bottom of the note.   No

## 2024-09-28 NOTE — ED PROVIDER NOTE - CLINICAL SUMMARY MEDICAL DECISION MAKING FREE TEXT BOX
.    84 no female, PMH as noted, presents with bilateral lower back pain.  Symptoms started on Wednesday, with left-sided lower back pain, patient had altered gait, using cane to favor left side.  Subsequently patient developed lower back pain in her right.  No weakness, numbness, bowel or bladder issues, midline spinal pain, fever, or trauma.  No falls.    Exam as noted above.  Patient has no hip tenderness, + normal ROM in bilateral extremities, although pain with extremities causes pain in lower back.  Patient is neurovasc intact distally.    Additional information taken from daughter at the bedside confirming HPI.    Differential diagnose includes but not limited to muscle strain, muscle sprain, fracture    X-ray shows no focal acute displaced fracture or dislocation.    Symptoms improved somewhat with analgesia.    When patient attempted to ambulate, patient had increased pain in the lower back, precluding safe ambulation.  Patient lives alone is concern for falling.    Impression lower back pain, decreased ambulation status, fall risk.  Patient admitted to medicine for further workup and management.    .

## 2024-09-28 NOTE — PATIENT PROFILE ADULT - NSTRANSFERBELONGINGSDISPO_GEN_A_NUR
Asthma     Chronic back pain     \"I Have Back Pain 24-7\"    Convulsions ([address]     Depression     Diabetes mellitus ([facility] [address] Dx 2013    Family history of coronary artery disease     Full dentures     H/O cardiac catheterization 09/14/2015    No evidence of signif.CAD.    H/O Doppler ultrasound 9/11/2015    CAROTID-normal    Heartburn     \"Sometimes\"    History of cardiovascular stress test 08/2016    EF=70%, NL study.  Hx of cardiovascular stress test 05/16/2018    Normal perfusion study with normal distribution in all coronal, short, and horizontal axis. The observed defect is consistent with breast attenuation artifact. Normal LV function. LVEF is > 70 %.  Hx of cardiovascular stress test 05/15/2018    Normal perfusion study with normal distribution in all coronal, short, and horizontal axis. The observed defect is consistent with breast attenuation artifact. Normal LV function.  LVEF is > 70 %    Hyperlipidemia     Hypertension     Hypothyroidism     Migraines Last Migraine 8-21-16    Nervous breakdown 2006    Obesity 7/2006    Panic attacks     Pneumonia Dx 1-12    PONV (postoperative nausea and vomiting)     Restless leg     Restless legs     Seizures (HCC)     Shortness of breath on exertion     Wears glasses      Past Surgical History:   Procedure Laterality Date    APPENDECTOMY  1-22-12    Open     CARPAL TUNNEL RELEASE Left 02/12/2014    CHOLECYSTECTOMY, LAPAROSCOPIC  12-11    DENTAL SURGERY      All Teeth Extracted In Past    DILATION AND CURETTAGE OF UTERUS  2008 Or 2009    ELBOW SURGERY Right 08/24/2016    Tennis elbow debridement    ENDOSCOPY, COLON, DIAGNOSTIC  7-22-13    balloon dil upto 20 cm    HYSTERECTOMY, VAGINAL  3/2016     Family History   Problem Relation Age of Onset    Depression Mother     High Blood Pressure Mother     Cancer Mother         Breast    Mental Illness Mother     Stroke Mother     Arthritis Mother     Breast Cancer Mother     Obesity Mother  Asthma Daughter     Early Death Brother         5 Months Old    Arthritis Brother     Heart Disease Father     High Blood Pressure Father     Arthritis Father     Hearing Loss Father     Arthritis Sister     Arthritis Maternal Aunt     Arthritis Maternal Uncle     Arthritis Paternal Aunt     Arthritis Paternal Uncle     Arthritis Maternal Grandmother     High Cholesterol Maternal Grandmother     Arthritis Maternal Grandfather     Arthritis Paternal Grandmother     Arthritis Paternal Grandfather     Arthritis Maternal Cousin     Arthritis Paternal Cousin     Atrial Fibrillation Neg Hx     Birth Defects Neg Hx     Colon Cancer Neg Hx     Diabetes Neg Hx     Kidney Disease Neg Hx     Learning Disabilities Neg Hx     Mental Retardation Neg Hx     Miscarriages / Stillbirths Neg Hx     Substance Abuse Neg Hx     Vision Loss Neg Hx      Social History     Socioeconomic History    Marital status: Single     Spouse name: Not on file    Number of children: Not on file    Years of education: Not on file    Highest education level: Not on file   Occupational History    Occupation: [employer]/medical leave at this time   Social Needs    Financial resource strain: Not on file    Food insecurity:     Worry: Not on file     Inability: Not on file   ROIÂ² needs:     Medical: Not on file     Non-medical: Not on file   Tobacco Use    Smoking status: Never Smoker    Smokeless tobacco: Never Used   Substance and Sexual Activity    Alcohol use: No     Alcohol/week: 0.0 standard drinks    Drug use: No    Sexual activity: Never   Lifestyle    Physical activity:     Days per week: Not on file     Minutes per session: Not on file    Stress: Not on file   Relationships    Social connections:     Talks on phone: Not on file     Gets together: Not on file     Attends Congregation service: Not on file     Active member of club or organization: Not on file     Attends meetings of clubs or [employer]      \"Stomach Ache That Lasts For Days\"    Lovastatin Nausea Only and Rash     Dizziness    Tape Patrisha Star Tape] Rash    Tramadol      \"Blood Sugar Drops\"       Nursing Notes Reviewed  PHYSICAL EXAM    VITAL SIGNS: /86   Pulse 94   Temp 97.6 °F (36.4 °C) (Oral)   Resp 19   Ht 5' 1\" (1.549 m)   Wt 212 lb (96.2 kg)   LMP  (Approximate) Comment: several months ago  SpO2 96%   BMI 40.06 kg/m²    Constitutional:  Well developed, obese female, In no acute distress  Head:  Normocephalic, Atraumatic  Eyes:  EOMI. Sclera clear. Conjunctiva normal, No discharge. Neck/Lymphatics: Supple, no JVD, No lymphadenopathy  Cardiovascular:  RRR, Normal S1 & S2  No murmurs/gallops/rubs  Peripheral Vascular: Distal pulses 2+, Capillary refill <2seconds  Respiratory:  Respirations nonnlabored, 97% on room air. Clear to auscultation bilaterally, No retractions  Abdomen: Bowel sounds normal in all quadrants, Soft, Non tender/Nondistended, No palpable abdominal masses. Musculoskeletal: BUE/BLE symmetrical without atrophy or deformities. Calves are symmetrical and supple. Integument:  Warm, Dry, Intact, Skin turgor and texture normal  Neurologic:  Alert & oriented x3 , No focal deficits noted. Cranial nerves II through XII grossly intact. No slurred speech. No facial droop. Normal gross motor coordination & motor strength bilateral upper and lower extremities.   No gait ataxia  Psychiatric:  Affect appropriate      I have reviewed and interpreted all of the currently available lab results from this visit (if applicable):  Results for orders placed or performed during the hospital encounter of 03/04/20   CBC Auto Differential   Result Value Ref Range    WBC 8.7 4.0 - 10.5 K/CU MM    RBC 4.25 4.2 - 5.4 M/CU MM    Hemoglobin 12.7 12.5 - 16.0 GM/DL    Hematocrit 39.8 37 - 47 %    MCV 93.6 78 - 100 FL    MCH 29.9 27 - 31 PG    MCHC 31.9 (L) 32.0 - 36.0 %    RDW 12.7 11.7 - 14.9 %    Platelets 236 481 - 547 K/CU MM physician's note for EKG interpretation      Chart review shows recent radiographs:  No results found. ED COURSE & MEDICAL DECISION MAKING       Vital signs and nursing notes reviewed during ED course. I have independently evaluated this patient . Supervising physician - Dr. Ehsan Raphael - was present in ED and available for consultation throughout entirety of patient's care. All pertinent Lab data and radiographic results reviewed with patient at bedside. The patient and/or the family were informed of the results of any tests/labs/imaging, the treatment plan, and time was allotted to answer questions. Clinical Impression:  1. Chest pain, unspecified type        Patient presents with chest pain shortness of breath. On exam, patient is obese nontoxic afebrile and in no acute respiratory distress. Vitals are stable, not tachycardic or hypoxic. Lungs with clear equal air exchange. Abdomen soft nontender. No lower extremity pitting edema or asymmetry. Patient is placed on to telemetry, given Zofran and aspirin. Labs today reassuring. No leukocytosis. CMP without significant derangement. Normal troponin and BNP. Chest x-ray reveals no evidence of acute cardiopulmonary process and chest x-ray is unremarkable. On chart review, last stress test May 2018 which is normal with an LVEF greater than 70%. Last cardiac cath in 2015. Given patient's age and risk factors, HEART score is 4. Patient initially had some improvement following aspirin but return of chest pain in the ED. Given additional nitro. At this time, do feel admission is warranted for ACS/chest pain rule out. Plan was discussed with patient who verbalized understanding agreement. I did consult with Dr. Esteban Arroyo - hospitalist - and discussed patient's history, ED presentation/course including any pertinent laboratory findings and imaging study findings. He/she agrees to hospital admission.  Patient is admitted to the hospital in stable condition. I did discuss this patient's history, ED presentation/course with my attending physician - Dr. Arnav Mason - who has also seen and evaluated this patient. He/she does agree that admission is reasonable at this time. Please see his/her note for additional details of their evaluation. Diagnosis and plan discussed in detail with patient who understands and agrees.       Disposition referral (if applicable):  Elian Braden DO  [facility] [phone]            Disposition medications (if applicable):  New Prescriptions    No medications on file         (Please note that portions of this note may have been completed with a voice recognition program. Efforts were made to edit the dictations but occasionally words are mis-transcribed.)          Willi Mejia PA-C  03/04/20 4954 not applicable

## 2024-09-28 NOTE — H&P ADULT - NSHPSOCIALHISTORY_GEN_ALL_CORE
Lives alone   Walks with cane Lives alone - 1st floor apartment with daughter upstairs  Walks with cane/walker  non smoker non drinker

## 2024-09-28 NOTE — ED PROVIDER NOTE - OBJECTIVE STATEMENT
84-year-old female with past medical history of rheumatoid arthritis, CAD, bilateral knee replacement, right hip replacement presented for evaluation of lower back/bilateral hip pain that has been getting worse since Wednesday.  No recent trauma.  States she had a fall several months ago.  No paresthesias or weakness.  No fevers or chills.

## 2024-09-28 NOTE — ED PROVIDER NOTE - PHYSICAL EXAMINATION
VITAL SIGNS: I have reviewed nursing notes and confirm.  CONSTITUTIONAL: Patient is in no acute distress.  SKIN: Skin exam is warm and dry, no acute rash.  HEAD: Normocephalic; atraumatic.  EYES: PERRL, EOM intact; conjunctiva and sclera clear.  ENT: No nasal discharge; airway clear.   NECK: Supple; non tender.  CARD: S1, S2 normal; no murmurs, gallops, or rubs. Regular rate and rhythm.  RESP: Clear to auscultation bilaterally. No wheezes, rales or rhonchi.  ABD: Normal bowel sounds; soft; non-distended; non-tender.   EXT/MSK: Normal ROM. No edema. Bilateral paraspinal tenderness to the lumbosacral region. No midline tenderness.  LYMPH: No acute cervical adenopathy.  NEURO: Alert, oriented. Grossly unremarkable. No focal deficits.  PSYCH: Cooperative, appropriate.

## 2024-09-28 NOTE — ED ADULT NURSE NOTE - NSFALLHARMRISKINTERV_ED_ALL_ED
Assistance OOB with selected safe patient handling equipment if applicable/Assistance with ambulation/Communicate risk of Fall with Harm to all staff, patient, and family/Monitor gait and stability/Provide visual cue: red socks, yellow wristband, yellow gown, etc/Reinforce activity limits and safety measures with patient and family/Use of alarms - bed, stretcher, chair and/or video monitoring/Bed in lowest position, wheels locked, appropriate side rails in place/Call bell, personal items and telephone in reach/Instruct patient to call for assistance before getting out of bed/chair/stretcher/Non-slip footwear applied when patient is off stretcher/[place] to call system/Physically safe environment - no spills, clutter or unnecessary equipment/Purposeful Proactive Rounding/Room/bathroom lighting operational, light cord in reach

## 2024-09-28 NOTE — ED ADULT TRIAGE NOTE - CHIEF COMPLAINT QUOTE
pt complaining of bilateral hip pain, denies injury to hips. states it has been getting progressively worse

## 2024-09-28 NOTE — ED PROVIDER NOTE - WR ORDER STATUS 1
Resulted Odomzo Counseling- I discussed with the patient the risks of Odomzo including but not limited to nausea, vomiting, diarrhea, constipation, weight loss, changes in the sense of taste, decreased appetite, muscle spasms, and hair loss.  The patient verbalized understanding of the proper use and possible adverse effects of Odomzo.  All of the patient's questions and concerns were addressed.

## 2024-09-28 NOTE — H&P ADULT - NSICDXPASTMEDICALHX_GEN_ALL_CORE_FT
PAST MEDICAL HISTORY:  Arthritis     Basal cell carcinoma     CAD (coronary artery disease)     Chronic kidney disease (CKD)     H/O knee surgery     History of hip surgery     Hypertension     Mitral valve disorder     Other irritable bowel syndrome     PUD (peptic ulcer disease)

## 2024-09-28 NOTE — ED PROVIDER NOTE - CARE PROVIDER_API CALL
Pelon Velasquez  Orthopaedic Surgery  [address] [facility] [address] [facility] [place] [address]  Phone: [phone]  Fax: [phone]  Follow Up Time: 1-3 Days    Your rheumatologist,   Phone: (   )    -  Fax: (   )    -  Follow Up Time: 1-3 Days

## 2024-09-29 LAB
ALBUMIN SERPL ELPH-MCNC: 3.5 G/DL — SIGNIFICANT CHANGE UP (ref 3.5–5.2)
ALP SERPL-CCNC: 210 U/L — HIGH (ref 30–115)
ALT FLD-CCNC: 62 U/L — HIGH (ref 0–41)
ANION GAP SERPL CALC-SCNC: 13 MMOL/L — SIGNIFICANT CHANGE UP (ref 7–14)
AST SERPL-CCNC: 63 U/L — HIGH (ref 0–41)
BASOPHILS # BLD AUTO: 0.03 K/UL — SIGNIFICANT CHANGE UP (ref 0–0.2)
BASOPHILS NFR BLD AUTO: 0.4 % — SIGNIFICANT CHANGE UP (ref 0–1)
BILIRUB SERPL-MCNC: 0.4 MG/DL — SIGNIFICANT CHANGE UP (ref 0.2–1.2)
BUN SERPL-MCNC: 32 MG/DL — HIGH (ref 10–20)
CALCIUM SERPL-MCNC: 8.7 MG/DL — SIGNIFICANT CHANGE UP (ref 8.4–10.5)
CHLORIDE SERPL-SCNC: 101 MMOL/L — SIGNIFICANT CHANGE UP (ref 98–110)
CO2 SERPL-SCNC: 25 MMOL/L — SIGNIFICANT CHANGE UP (ref 17–32)
CREAT SERPL-MCNC: 1.5 MG/DL — SIGNIFICANT CHANGE UP (ref 0.7–1.5)
EGFR: 34 ML/MIN/1.73M2 — LOW
EOSINOPHIL # BLD AUTO: 0.05 K/UL — SIGNIFICANT CHANGE UP (ref 0–0.7)
EOSINOPHIL NFR BLD AUTO: 0.6 % — SIGNIFICANT CHANGE UP (ref 0–8)
GLUCOSE SERPL-MCNC: 113 MG/DL — HIGH (ref 70–99)
HCT VFR BLD CALC: 34 % — LOW (ref 37–47)
HGB BLD-MCNC: 10.5 G/DL — LOW (ref 12–16)
IMM GRANULOCYTES NFR BLD AUTO: 0.6 % — HIGH (ref 0.1–0.3)
LYMPHOCYTES # BLD AUTO: 0.64 K/UL — LOW (ref 1.2–3.4)
LYMPHOCYTES # BLD AUTO: 8.3 % — LOW (ref 20.5–51.1)
MAGNESIUM SERPL-MCNC: 2 MG/DL — SIGNIFICANT CHANGE UP (ref 1.8–2.4)
MCHC RBC-ENTMCNC: 30.9 G/DL — LOW (ref 32–37)
MCHC RBC-ENTMCNC: 31.3 PG — HIGH (ref 27–31)
MCV RBC AUTO: 101.5 FL — HIGH (ref 81–99)
MONOCYTES # BLD AUTO: 0.35 K/UL — SIGNIFICANT CHANGE UP (ref 0.1–0.6)
MONOCYTES NFR BLD AUTO: 4.5 % — SIGNIFICANT CHANGE UP (ref 1.7–9.3)
NEUTROPHILS # BLD AUTO: 6.58 K/UL — HIGH (ref 1.4–6.5)
NEUTROPHILS NFR BLD AUTO: 85.6 % — HIGH (ref 42.2–75.2)
NRBC # BLD: 0 /100 WBCS — SIGNIFICANT CHANGE UP (ref 0–0)
PHOSPHATE SERPL-MCNC: 4.6 MG/DL — SIGNIFICANT CHANGE UP (ref 2.1–4.9)
PLATELET # BLD AUTO: 418 K/UL — HIGH (ref 130–400)
PMV BLD: 9.8 FL — SIGNIFICANT CHANGE UP (ref 7.4–10.4)
POTASSIUM SERPL-MCNC: 4.4 MMOL/L — SIGNIFICANT CHANGE UP (ref 3.5–5)
POTASSIUM SERPL-SCNC: 4.4 MMOL/L — SIGNIFICANT CHANGE UP (ref 3.5–5)
PROT SERPL-MCNC: 6.7 G/DL — SIGNIFICANT CHANGE UP (ref 6–8)
RBC # BLD: 3.35 M/UL — LOW (ref 4.2–5.4)
RBC # FLD: 15 % — HIGH (ref 11.5–14.5)
SODIUM SERPL-SCNC: 139 MMOL/L — SIGNIFICANT CHANGE UP (ref 135–146)
WBC # BLD: 7.7 K/UL — SIGNIFICANT CHANGE UP (ref 4.8–10.8)
WBC # FLD AUTO: 7.7 K/UL — SIGNIFICANT CHANGE UP (ref 4.8–10.8)

## 2024-09-29 PROCEDURE — 99233 SBSQ HOSP IP/OBS HIGH 50: CPT

## 2024-09-29 RX ADMIN — OXYCODONE HYDROCHLORIDE 10 MILLIGRAM(S): 30 TABLET, FILM COATED, EXTENDED RELEASE ORAL at 10:55

## 2024-09-29 RX ADMIN — OXYCODONE HYDROCHLORIDE 10 MILLIGRAM(S): 30 TABLET, FILM COATED, EXTENDED RELEASE ORAL at 11:13

## 2024-09-29 RX ADMIN — Medication 1000 MILLIGRAM(S): at 02:27

## 2024-09-29 RX ADMIN — Medication 4 MILLIGRAM(S): at 13:57

## 2024-09-29 RX ADMIN — ATORVASTATIN CALCIUM 80 MILLIGRAM(S): 10 TABLET, FILM COATED ORAL at 21:33

## 2024-09-29 RX ADMIN — Medication 81 MILLIGRAM(S): at 11:12

## 2024-09-29 RX ADMIN — Medication 4 MILLIGRAM(S): at 02:27

## 2024-09-29 RX ADMIN — PANTOPRAZOLE SODIUM 40 MILLIGRAM(S): 40 TABLET, DELAYED RELEASE ORAL at 05:40

## 2024-09-29 RX ADMIN — Medication 10 MILLIGRAM(S): at 11:12

## 2024-09-29 RX ADMIN — Medication 1000 MICROGRAM(S): at 11:12

## 2024-09-29 RX ADMIN — Medication 5000 UNIT(S): at 18:20

## 2024-09-29 RX ADMIN — Medication 5000 UNIT(S): at 05:40

## 2024-09-29 NOTE — PROGRESS NOTE ADULT - ASSESSMENT
85 yo female with lower back pain and inability to ambulate    # Lower back pain r/o cauda equina syndrome  - cont Robaxin Q8h  - Oxycodone IR PRN mod- severe pain  - Tylenol 1gm Q8h  - CT T-L-S spine - cauda equina compression  - transfer [place]  - f/u neurosurgery recs  - PT/OT eval    # HTN  - monitor BP  - cont amlodipine/metoprolol    # HLD  - cont atorvastatin  - low fat diet    # RA/OA  - cont Methotrexate weekly on tuesdays    # Depression  - cont Lexapro    # PUD  - Protonix daily    # CAD  - cont ASA    DVT proph  Heparin SQ  Diet dash/tlc  activity bedrest until cleared by neuro srg

## 2024-09-30 PROBLEM — K27.9 PEPTIC ULCER, SITE UNSPECIFIED, UNSPECIFIED AS ACUTE OR CHRONIC, WITHOUT HEMORRHAGE OR PERFORATION: Chronic | Status: ACTIVE | Noted: 2024-09-28

## 2024-09-30 PROBLEM — N18.9 CHRONIC KIDNEY DISEASE, UNSPECIFIED: Chronic | Status: ACTIVE | Noted: 2024-09-28

## 2024-09-30 PROBLEM — I25.10 ATHEROSCLEROTIC HEART DISEASE OF NATIVE CORONARY ARTERY WITHOUT ANGINA PECTORIS: Chronic | Status: ACTIVE | Noted: 2024-09-28

## 2024-09-30 LAB
24R-OH-CALCIDIOL SERPL-MCNC: 30 NG/ML — SIGNIFICANT CHANGE UP (ref 30–80)
ALBUMIN SERPL ELPH-MCNC: 3.4 G/DL — LOW (ref 3.5–5.2)
ALP SERPL-CCNC: 170 U/L — HIGH (ref 30–115)
ALT FLD-CCNC: 55 U/L — HIGH (ref 0–41)
ANION GAP SERPL CALC-SCNC: 11 MMOL/L — SIGNIFICANT CHANGE UP (ref 7–14)
APPEARANCE UR: CLEAR — SIGNIFICANT CHANGE UP
AST SERPL-CCNC: 37 U/L — SIGNIFICANT CHANGE UP (ref 0–41)
BILIRUB SERPL-MCNC: 0.3 MG/DL — SIGNIFICANT CHANGE UP (ref 0.2–1.2)
BILIRUB UR-MCNC: NEGATIVE — SIGNIFICANT CHANGE UP
BUN SERPL-MCNC: 37 MG/DL — HIGH (ref 10–20)
CALCIUM SERPL-MCNC: 7.9 MG/DL — LOW (ref 8.4–10.5)
CHLORIDE SERPL-SCNC: 98 MMOL/L — SIGNIFICANT CHANGE UP (ref 98–110)
CO2 SERPL-SCNC: 23 MMOL/L — SIGNIFICANT CHANGE UP (ref 17–32)
COLOR SPEC: YELLOW — SIGNIFICANT CHANGE UP
CREAT SERPL-MCNC: 2 MG/DL — HIGH (ref 0.7–1.5)
DIFF PNL FLD: NEGATIVE — SIGNIFICANT CHANGE UP
EGFR: 24 ML/MIN/1.73M2 — LOW
GLUCOSE SERPL-MCNC: 105 MG/DL — HIGH (ref 70–99)
GLUCOSE UR QL: NEGATIVE MG/DL — SIGNIFICANT CHANGE UP
HCT VFR BLD CALC: 29.6 % — LOW (ref 37–47)
HGB BLD-MCNC: 9.2 G/DL — LOW (ref 12–16)
KETONES UR-MCNC: NEGATIVE MG/DL — SIGNIFICANT CHANGE UP
LEUKOCYTE ESTERASE UR-ACNC: NEGATIVE — SIGNIFICANT CHANGE UP
MAGNESIUM SERPL-MCNC: 1.7 MG/DL — LOW (ref 1.8–2.4)
MCHC RBC-ENTMCNC: 31.1 G/DL — LOW (ref 32–37)
MCHC RBC-ENTMCNC: 31.9 PG — HIGH (ref 27–31)
MCV RBC AUTO: 102.8 FL — HIGH (ref 81–99)
NITRITE UR-MCNC: NEGATIVE — SIGNIFICANT CHANGE UP
NRBC # BLD: 0 /100 WBCS — SIGNIFICANT CHANGE UP (ref 0–0)
PH UR: 5.5 — SIGNIFICANT CHANGE UP (ref 5–8)
PHOSPHATE SERPL-MCNC: 3.2 MG/DL — SIGNIFICANT CHANGE UP (ref 2.1–4.9)
PLATELET # BLD AUTO: 364 K/UL — SIGNIFICANT CHANGE UP (ref 130–400)
PMV BLD: 9.8 FL — SIGNIFICANT CHANGE UP (ref 7.4–10.4)
POTASSIUM SERPL-MCNC: 4 MMOL/L — SIGNIFICANT CHANGE UP (ref 3.5–5)
POTASSIUM SERPL-SCNC: 4 MMOL/L — SIGNIFICANT CHANGE UP (ref 3.5–5)
PROT SERPL-MCNC: 5.8 G/DL — LOW (ref 6–8)
PROT UR-MCNC: NEGATIVE MG/DL — SIGNIFICANT CHANGE UP
RBC # BLD: 2.88 M/UL — LOW (ref 4.2–5.4)
RBC # FLD: 15.2 % — HIGH (ref 11.5–14.5)
SODIUM SERPL-SCNC: 132 MMOL/L — LOW (ref 135–146)
SP GR SPEC: 1.01 — SIGNIFICANT CHANGE UP (ref 1–1.03)
UROBILINOGEN FLD QL: 0.2 MG/DL — SIGNIFICANT CHANGE UP (ref 0.2–1)
WBC # BLD: 6.65 K/UL — SIGNIFICANT CHANGE UP (ref 4.8–10.8)
WBC # FLD AUTO: 6.65 K/UL — SIGNIFICANT CHANGE UP (ref 4.8–10.8)

## 2024-09-30 PROCEDURE — 72148 MRI LUMBAR SPINE W/O DYE: CPT | Mod: 26

## 2024-09-30 PROCEDURE — 99232 SBSQ HOSP IP/OBS MODERATE 35: CPT

## 2024-09-30 RX ORDER — METHOTREXATE 25 MG/.5ML
2.5 INJECTION, SOLUTION SUBCUTANEOUS
Refills: 0 | Status: DISCONTINUED | OUTPATIENT
Start: 2024-10-01 | End: 2024-10-01

## 2024-09-30 RX ORDER — TAMSULOSIN HCL 0.4 MG
0.4 CAPSULE ORAL AT BEDTIME
Refills: 0 | Status: DISCONTINUED | OUTPATIENT
Start: 2024-09-30 | End: 2024-10-03

## 2024-09-30 RX ORDER — MAGNESIUM SULFATE 500 MG/ML
2 VIAL (ML) INJECTION ONCE
Refills: 0 | Status: COMPLETED | OUTPATIENT
Start: 2024-09-30 | End: 2024-09-30

## 2024-09-30 RX ADMIN — Medication 25 GRAM(S): at 14:22

## 2024-09-30 RX ADMIN — Medication 81 MILLIGRAM(S): at 11:38

## 2024-09-30 RX ADMIN — OXYCODONE HYDROCHLORIDE 10 MILLIGRAM(S): 30 TABLET, FILM COATED, EXTENDED RELEASE ORAL at 21:16

## 2024-09-30 RX ADMIN — Medication 0.4 MILLIGRAM(S): at 21:16

## 2024-09-30 RX ADMIN — Medication 25 MILLIGRAM(S): at 17:28

## 2024-09-30 RX ADMIN — OXYCODONE HYDROCHLORIDE 10 MILLIGRAM(S): 30 TABLET, FILM COATED, EXTENDED RELEASE ORAL at 22:00

## 2024-09-30 RX ADMIN — Medication 10 MILLIGRAM(S): at 11:38

## 2024-09-30 RX ADMIN — ATORVASTATIN CALCIUM 80 MILLIGRAM(S): 10 TABLET, FILM COATED ORAL at 21:16

## 2024-09-30 RX ADMIN — PANTOPRAZOLE SODIUM 40 MILLIGRAM(S): 40 TABLET, DELAYED RELEASE ORAL at 06:33

## 2024-09-30 RX ADMIN — Medication 1000 MICROGRAM(S): at 11:38

## 2024-09-30 RX ADMIN — Medication 5000 UNIT(S): at 17:28

## 2024-09-30 RX ADMIN — Medication 5000 UNIT(S): at 06:33

## 2024-09-30 NOTE — OCCUPATIONAL THERAPY INITIAL EVALUATION ADULT - GENERAL OBSERVATIONS, REHAB EVAL
Pt encountered and left semi-reclined in bed in NAD, +IVL, +call bell, +bed alarm. no c/o pain at rest, c/o pain 6/10 with activity. A+O x4, agreeable to OT adrián. RN and MD aware.

## 2024-09-30 NOTE — CHART NOTE - NSCHARTNOTEFT_GEN_A_CORE
83 yo female with PMHx HTN, HLD, PUD s/p cauterization, RA/OA, CKD presents with c/o pain across lower back x 1 week. Pt states it started in the L hip but now is across the whole lower back. Pt states it is too painful to walk. Pt states it feels better when sitting or lying down. She denies any trauma or precipitating event. Pt has also had an unintentional weight loss of 10 pounds in the last 2 weeks. Denies fever/chills, malaise or lethargy.      < from: MR Lumbar Spine No Cont (09.30.24 @ 09:15) >  IMPRESSION:  Marrow edema noted of the left aspect of the sacrum suggestive of an   insufficiency fracture. No discrete fracture line is identified.  Multilevel degenerative changesin combination with lumbar levoscoliosis   contributing to moderate spinal stenosis as well as bilateral foraminal   narrowing, most significant at the L2-L3 and L3-L4 levels.    PLAN   - No further acute neurosurgical intervention   - Recc ortho for L hip and L sacral insufficiency fx   - Pain management consult   - F/U outpatient with Dr. Garner for poss candidate Intracept procedure outpatient   - Discussed case with attending 83 yo female with PMHx HTN, HLD, PUD s/p cauterization, RA/OA, CKD presents with c/o pain across lower back x 1 week. Pt states it started in the L hip but now is across the whole lower back. Pt states it is too painful to walk. Pt states it feels better when sitting or lying down. She denies any trauma or precipitating event. Pt has also had an unintentional weight loss of 10 pounds in the last 2 weeks. Denies fever/chills, malaise or lethargy.      < from: MR Lumbar Spine No Cont (09.30.24 @ 09:15) >  IMPRESSION:  Marrow edema noted of the left aspect of the sacrum suggestive of an   insufficiency fracture. No discrete fracture line is identified.  Multilevel degenerative changesin combination with lumbar levoscoliosis   contributing to moderate spinal stenosis as well as bilateral foraminal   narrowing, most significant at the L2-L3 and L3-L4 levels.    PLAN   - No further acute neurosurgical intervention   - Recc ortho for L hip and L sacral insufficiency fx   - Pain management consult   - F/U outpatient with Dr. Garner for poss candidate Intracept procedure outpatient- after above conservative management with outpatient PT and pain management  - Discussed case with attending

## 2024-09-30 NOTE — OCCUPATIONAL THERAPY INITIAL EVALUATION ADULT - PERTINENT HX OF CURRENT PROBLEM, REHAB EVAL
85 yo female with PMHx HTN, HLD, PUD s/p cauterization, RA/OA, CKD presents with c/o pain across lower back x 1 week. Pt states it started in the L hip but now is across the whole lower back. Pt states it is too painful to walk. Pt states it feels better when sitting or lying down. She denies any trauma or precipitating event. Pt has also had an unintentional weight loss of 10 pounds in the last 2 weeks. Denies fever/chills, malaise or lethargy.      from: CT Lumbar Spine No Cont (09.28.24 @ 16:57)  Multilevel degenerative changes within the lumbar spine resulting in   severe spinal canal stenosis and compression of the cauda equina.

## 2024-09-30 NOTE — PROGRESS NOTE ADULT - ASSESSMENT
85 yo female with lower back pain and inability to ambulate    # Lower back pain r/o cauda equina syndrome?  - cont Robaxin Q8h  - Oxycodone IR PRN mod- severe pain  - Tylenol 1gm Q8h  - CT T-L-S spine - cauda equina compression  - f/u neurosurgery recs  as per NS   - No further acute neurosurgical intervention   - Recc ortho for L hip and L sacral insufficiency fx   - Pain management consult   - F/U outpatient with Dr. Garner for poss candidate Intracept procedure outpatient- after above conservative management with outpatient PT and pain managemen      # HTN  - monitor BP  - cont amlodipine/metoprolol    # HLD  - cont atorvastatin  - low fat diet    # RA/OA  - cont Methotrexate weekly on tuesdays    # Depression  - cont Lexapro    # PUD  - Protonix daily    # CAD  - cont ASA    DVT proph  Heparin SQ  Diet dash/tlc  activity bedrest until cleared by neuro srg      follow up: follow NS, ortho consult  and pain management consult as per NS, duplex pending   bladder check as needed if retaining >350 ml straight cath   monitor LFTs.

## 2024-09-30 NOTE — OCCUPATIONAL THERAPY INITIAL EVALUATION ADULT - LIVES WITH, PROFILE
Pt lives in an apartment in daughter's home, 0 steps to enter 2 steps with HR to living space, no steps within./alone

## 2024-09-30 NOTE — OCCUPATIONAL THERAPY INITIAL EVALUATION ADULT - ADL RETRAINING, OT EVAL
By discharge, Pt will perform lower body dressing with minimal assistance using rolling walker and adaptive equipment as necessary

## 2024-09-30 NOTE — OCCUPATIONAL THERAPY INITIAL EVALUATION ADULT - SPECIFY REASON(S)
Pt chart reviewed. Pt on hold for OT eval at this time, Pt with active bedrest orders. Will follow up when appropriate activity orders provided

## 2024-09-30 NOTE — OCCUPATIONAL THERAPY INITIAL EVALUATION ADULT - LEVEL OF INDEPENDENCE: EATING, OT EVAL
Pt discharged to [facility] at 1443. Discharge instructions were reviewed by writer. Pt verbalized  understanding discharge orders. Stated that he will remain safe in the community, will take all his meds as ordered.  Pt denied SI, HI, and hallucinations. Pt took all his personal belongings and was sent  with a copy of AVS and a 30 days supply of medications.  Patient was sent to [facility] via Taxi. Staff escorted pt to the main entrance to catch the taxi.    independent

## 2024-09-30 NOTE — PROGRESS NOTE ADULT - ASSESSMENT
83 yo female with lower back pain and inability to ambulate    # Lower back pain r/o cauda equina syndrome?  - cont Robaxin Q8h  - Oxycodone IR PRN mod- severe pain  - Tylenol 1gm Q8h  - CT T-L-S spine - cauda equina compression  - neurosurgery recs - no further acute neurosurgical intervention, recc ortho for L hip and L sacral insufficiency fx, pain management consult, F/U outpatient with Dr. Garner for poss candidate Intracept procedure outpatient- after above conservative management with outpatient PT and pain management  - bladder scan q6 and straight cath if retaining >350cc  - ortho recs pending - Bone Health Labs (Vit D, Prealbumin, PTH, TSH), Inflammatory labs (Esr, Crp, HgbA1c)  - pain management recs pending  - bone health labs pending  - inflammatory labs pending    # B/l LE pain  - b/l LE venous duplex pending    # HTN  - monitor BP  - cont amlodipine/metoprolol    # HLD  - cont atorvastatin  - low fat diet    # RA/OA  - cont Methotrexate weekly on tuesdays    # Depression  - cont Lexapro    # PUD  - Protonix daily    # CAD  - cont ASA      follow up: follow NS, ortho consult  and pain management consult as per NS, duplex pending   bladder check as needed if retaining >350 ml straight cath   monitor LFTs.     #DVT prophylaxis: Heparin  #GI prophylaxis: PPI  #Diet: DASH/TLC  #Activity: AAT  #Code status: Full Code  #Disposition: TBD    #Pending: ortho, pain management, b/l LE venous duplex, monitor LFTs, bladder scan and straight cath if retaining >350cc, bone health labs, inflammatory labs 83 yo female with lower back pain and inability to ambulate    # Lower back pain r/o cauda equina syndrome?  - cont Robaxin Q8h  - Oxycodone IR PRN mod- severe pain  - Tylenol 1gm Q8h  - CT T-L-S spine - cauda equina compression  - neurosurgery recs - no further acute neurosurgical intervention, recc ortho for L hip and L sacral insufficiency fx, pain management consult, F/U outpatient with Dr. Garner for poss candidate Intracept procedure outpatient- after above conservative management with outpatient PT and pain management  - bladder scan q6 and straight cath if retaining >350cc  - ortho recs pending - Bone [facility] Labs (Vit D, Prealbumin, PTH, TSH), Inflammatory labs (Esr, Crp, HgbA1c)  - pain management recs pending  - bone health labs pending  - inflammatory labs pending    # B/l LE pain  - b/l LE venous duplex pending    # HTN  - monitor BP  - hold amlodipine  - cont metoprolol    # HLD  - cont atorvastatin  - low fat diet    # RA/OA  - cont Methotrexate weekly on tuesdays    # Depression  - cont Lexapro    # PUD  - Protonix daily    # CAD  - cont ASA      follow up: follow NS, ortho consult  and pain management consult as per NS, duplex pending   bladder check as needed if retaining >350 ml straight cath   monitor LFTs.     #DVT prophylaxis: Heparin  #GI prophylaxis: PPI  #Diet: DASH/TLC  #Activity: AAT  #Code status: Full Code  #Disposition: TBD    #Pending: ortho, pain management, b/l LE venous duplex, monitor LFTs, bladder scan and straight cath if retaining >350cc, bone health labs, inflammatory labs 83 yo female with lower back pain and inability to ambulate    # Lower back pain r/o cauda equina syndrome?  - cont Robaxin Q8h  - Oxycodone IR PRN mod- severe pain  - Tylenol 1gm Q8h  - CT T-L-S spine - cauda equina compression  - neurosurgery recs - no further acute neurosurgical intervention, recc ortho for L hip and L sacral insufficiency fx, pain management consult, F/U outpatient with Dr. Garner for poss candidate Intracept procedure outpatient- after above conservative management with outpatient PT and pain management  - bladder scan q6 and straight cath if retaining >350cc  - started flomax  - ortho recs pending - Bone [facility] (Vit D, Prealbumin, PTH, TSH), Inflammatory labs (Esr, Crp, HgbA1c)  - pain management recs pending  - bone health labs pending  - inflammatory labs pending    # B/l LE pain  - b/l LE venous duplex pending    # HTN  - monitor BP  - hold amlodipine  - cont metoprolol    # HLD  - cont atorvastatin  - low fat diet    # RA/OA  - cont Methotrexate weekly on tuesdays    # Depression  - cont Lexapro    # PUD  - Protonix daily    # CAD  - cont ASA      follow up: follow NS, ortho consult  and pain management consult as per NS, duplex pending   bladder check as needed if retaining >350 ml straight cath   monitor LFTs.     #DVT prophylaxis: Heparin  #GI prophylaxis: PPI  #Diet: DASH/TLC  #Activity: AAT  #Code status: Full Code  #Disposition: TBD    #Pending: ortho, pain management, b/l LE venous duplex, monitor LFTs, bladder scan and straight cath if retaining >350cc, bone health labs, inflammatory labs 83 yo female with lower back pain and inability to ambulate    # Lower back pain r/o cauda equina syndrome?  - cont Robaxin Q8h  - Oxycodone IR PRN mod- severe pain  - Tylenol 1gm Q8h  - CT T-L-S spine - cauda equina compression  - neurosurgery recs - no further acute neurosurgical intervention, recc ortho for L hip and L sacral insufficiency fx, pain management consult, F/U outpatient with Dr. Garner for poss candidate Intracept procedure outpatient- after above conservative management with outpatient PT and pain management  - bladder scan q6 and straight cath if retaining >350cc  - started flomax  - ortho recs pending - Bone [facility] Labs (Vit D, Prealbumin, PTH, TSH), Inflammatory labs (Esr, Crp, HgbA1c), DEXA scan outpt?, f/u w/ ortho outpt w/ Dr. Yepez  - pain management recs pending  - bone health labs pending  - inflammatory labs pending  - monitor LFTs    # B/l LE pain  - b/l LE venous duplex pending    # HTN  - monitor BP  - hold amlodipine  - cont metoprolol    # HLD  - cont atorvastatin  - low fat diet    # RA/OA  - cont Methotrexate weekly on tuesdays    # Depression  - cont Lexapro    # PUD  - Protonix daily    # CAD  - cont ASA      #DVT prophylaxis: Heparin  #GI prophylaxis: PPI  #Diet: DASH/TLC  #Activity: AAT  #Code status: Full Code  #Disposition: TBD    #Pending: ortho, pain management, b/l LE venous duplex, monitor LFTs, bladder scan and straight cath if retaining >350cc, bone health labs, inflammatory labs

## 2024-10-01 LAB
A1C WITH ESTIMATED AVERAGE GLUCOSE RESULT: 5.9 % — HIGH (ref 4–5.6)
ALBUMIN SERPL ELPH-MCNC: 3.2 G/DL — LOW (ref 3.5–5.2)
ALP SERPL-CCNC: 152 U/L — HIGH (ref 30–115)
ALT FLD-CCNC: 43 U/L — HIGH (ref 0–41)
ANION GAP SERPL CALC-SCNC: 11 MMOL/L — SIGNIFICANT CHANGE UP (ref 7–14)
AST SERPL-CCNC: 27 U/L — SIGNIFICANT CHANGE UP (ref 0–41)
BASOPHILS # BLD AUTO: 0.02 K/UL — SIGNIFICANT CHANGE UP (ref 0–0.2)
BASOPHILS NFR BLD AUTO: 0.3 % — SIGNIFICANT CHANGE UP (ref 0–1)
BILIRUB SERPL-MCNC: 0.2 MG/DL — SIGNIFICANT CHANGE UP (ref 0.2–1.2)
BUN SERPL-MCNC: 45 MG/DL — HIGH (ref 10–20)
CALCIUM SERPL-MCNC: 8 MG/DL — LOW (ref 8.4–10.5)
CHLORIDE SERPL-SCNC: 98 MMOL/L — SIGNIFICANT CHANGE UP (ref 98–110)
CO2 SERPL-SCNC: 23 MMOL/L — SIGNIFICANT CHANGE UP (ref 17–32)
CREAT SERPL-MCNC: 2.1 MG/DL — HIGH (ref 0.7–1.5)
CRP SERPL-MCNC: 19.6 MG/L — HIGH
EGFR: 23 ML/MIN/1.73M2 — LOW
EOSINOPHIL # BLD AUTO: 0.14 K/UL — SIGNIFICANT CHANGE UP (ref 0–0.7)
EOSINOPHIL NFR BLD AUTO: 2.4 % — SIGNIFICANT CHANGE UP (ref 0–8)
ERYTHROCYTE [SEDIMENTATION RATE] IN BLOOD: 72 MM/HR — HIGH (ref 0–20)
ESTIMATED AVERAGE GLUCOSE: 123 MG/DL — HIGH (ref 68–114)
GLUCOSE SERPL-MCNC: 108 MG/DL — HIGH (ref 70–99)
HCT VFR BLD CALC: 28.9 % — LOW (ref 37–47)
HGB BLD-MCNC: 8.9 G/DL — LOW (ref 12–16)
IMM GRANULOCYTES NFR BLD AUTO: 0.7 % — HIGH (ref 0.1–0.3)
LYMPHOCYTES # BLD AUTO: 1.08 K/UL — LOW (ref 1.2–3.4)
LYMPHOCYTES # BLD AUTO: 18.2 % — LOW (ref 20.5–51.1)
MAGNESIUM SERPL-MCNC: 2.2 MG/DL — SIGNIFICANT CHANGE UP (ref 1.8–2.4)
MCHC RBC-ENTMCNC: 30.8 G/DL — LOW (ref 32–37)
MCHC RBC-ENTMCNC: 31.6 PG — HIGH (ref 27–31)
MCV RBC AUTO: 102.5 FL — HIGH (ref 81–99)
MONOCYTES # BLD AUTO: 1.05 K/UL — HIGH (ref 0.1–0.6)
MONOCYTES NFR BLD AUTO: 17.7 % — HIGH (ref 1.7–9.3)
NEUTROPHILS # BLD AUTO: 3.6 K/UL — SIGNIFICANT CHANGE UP (ref 1.4–6.5)
NEUTROPHILS NFR BLD AUTO: 60.7 % — SIGNIFICANT CHANGE UP (ref 42.2–75.2)
NRBC # BLD: 0 /100 WBCS — SIGNIFICANT CHANGE UP (ref 0–0)
PHOSPHATE SERPL-MCNC: 3.8 MG/DL — SIGNIFICANT CHANGE UP (ref 2.1–4.9)
PLATELET # BLD AUTO: 309 K/UL — SIGNIFICANT CHANGE UP (ref 130–400)
PMV BLD: 9.4 FL — SIGNIFICANT CHANGE UP (ref 7.4–10.4)
POTASSIUM SERPL-MCNC: 3.9 MMOL/L — SIGNIFICANT CHANGE UP (ref 3.5–5)
POTASSIUM SERPL-SCNC: 3.9 MMOL/L — SIGNIFICANT CHANGE UP (ref 3.5–5)
PROT SERPL-MCNC: 5.6 G/DL — LOW (ref 6–8)
RBC # BLD: 2.82 M/UL — LOW (ref 4.2–5.4)
RBC # FLD: 15.2 % — HIGH (ref 11.5–14.5)
SODIUM SERPL-SCNC: 132 MMOL/L — LOW (ref 135–146)
TSH SERPL-MCNC: 2.44 UIU/ML — SIGNIFICANT CHANGE UP (ref 0.27–4.2)
WBC # BLD: 5.93 K/UL — SIGNIFICANT CHANGE UP (ref 4.8–10.8)
WBC # FLD AUTO: 5.93 K/UL — SIGNIFICANT CHANGE UP (ref 4.8–10.8)

## 2024-10-01 PROCEDURE — 93970 EXTREMITY STUDY: CPT | Mod: 26

## 2024-10-01 PROCEDURE — 76770 US EXAM ABDO BACK WALL COMP: CPT | Mod: 26

## 2024-10-01 PROCEDURE — 99232 SBSQ HOSP IP/OBS MODERATE 35: CPT

## 2024-10-01 PROCEDURE — 76705 ECHO EXAM OF ABDOMEN: CPT | Mod: 26

## 2024-10-01 RX ORDER — METHOTREXATE 25 MG/.5ML
10 INJECTION, SOLUTION SUBCUTANEOUS AT BEDTIME
Refills: 0 | Status: DISCONTINUED | OUTPATIENT
Start: 2024-10-01 | End: 2024-10-01

## 2024-10-01 RX ORDER — SODIUM CHLORIDE 0.9 % (FLUSH) 0.9 %
1000 SYRINGE (ML) INJECTION
Refills: 0 | Status: DISCONTINUED | OUTPATIENT
Start: 2024-10-01 | End: 2024-10-03

## 2024-10-01 RX ORDER — METHOTREXATE 25 MG/.5ML
8 INJECTION, SOLUTION SUBCUTANEOUS
Refills: 0 | DISCHARGE

## 2024-10-01 RX ORDER — METHOTREXATE 25 MG/.5ML
7.5 INJECTION, SOLUTION SUBCUTANEOUS ONCE
Refills: 0 | Status: COMPLETED | OUTPATIENT
Start: 2024-10-01 | End: 2024-10-01

## 2024-10-01 RX ORDER — FOLIC ACID 1 MG/1
2 TABLET ORAL
Refills: 0 | DISCHARGE

## 2024-10-01 RX ORDER — FOLIC ACID 1 MG/1
2 TABLET ORAL DAILY
Refills: 0 | Status: DISCONTINUED | OUTPATIENT
Start: 2024-10-01 | End: 2024-10-03

## 2024-10-01 RX ORDER — METHOTREXATE 25 MG/.5ML
10 INJECTION, SOLUTION SUBCUTANEOUS ONCE
Refills: 0 | Status: COMPLETED | OUTPATIENT
Start: 2024-10-01 | End: 2024-10-01

## 2024-10-01 RX ORDER — ACETAMINOPHEN 325 MG
650 TABLET ORAL EVERY 6 HOURS
Refills: 0 | Status: DISCONTINUED | OUTPATIENT
Start: 2024-10-01 | End: 2024-10-03

## 2024-10-01 RX ORDER — LIDOCAINE 50 MG/G
1 CREAM TOPICAL DAILY
Refills: 0 | Status: DISCONTINUED | OUTPATIENT
Start: 2024-10-01 | End: 2024-10-03

## 2024-10-01 RX ORDER — SENNOSIDES 8.6 MG
2 TABLET ORAL AT BEDTIME
Refills: 0 | Status: DISCONTINUED | OUTPATIENT
Start: 2024-10-01 | End: 2024-10-03

## 2024-10-01 RX ADMIN — Medication 17 GRAM(S): at 17:35

## 2024-10-01 RX ADMIN — Medication 25 MILLIGRAM(S): at 17:37

## 2024-10-01 RX ADMIN — PANTOPRAZOLE SODIUM 40 MILLIGRAM(S): 40 TABLET, DELAYED RELEASE ORAL at 05:54

## 2024-10-01 RX ADMIN — Medication 25 MILLIGRAM(S): at 08:00

## 2024-10-01 RX ADMIN — Medication 650 MILLIGRAM(S): at 23:49

## 2024-10-01 RX ADMIN — Medication 75 MILLILITER(S): at 23:51

## 2024-10-01 RX ADMIN — Medication 500 MILLIGRAM(S): at 14:01

## 2024-10-01 RX ADMIN — Medication 650 MILLIGRAM(S): at 17:37

## 2024-10-01 RX ADMIN — Medication 17 GRAM(S): at 10:25

## 2024-10-01 RX ADMIN — Medication 650 MILLIGRAM(S): at 19:19

## 2024-10-01 RX ADMIN — Medication 5000 UNIT(S): at 05:54

## 2024-10-01 RX ADMIN — Medication 5000 UNIT(S): at 17:37

## 2024-10-01 RX ADMIN — Medication 81 MILLIGRAM(S): at 11:45

## 2024-10-01 RX ADMIN — LIDOCAINE 1 PATCH: 50 CREAM TOPICAL at 14:01

## 2024-10-01 RX ADMIN — ATORVASTATIN CALCIUM 80 MILLIGRAM(S): 10 TABLET, FILM COATED ORAL at 22:02

## 2024-10-01 RX ADMIN — Medication 1000 MICROGRAM(S): at 11:44

## 2024-10-01 RX ADMIN — Medication 10 MILLIGRAM(S): at 11:46

## 2024-10-01 RX ADMIN — METHOTREXATE 10 MILLIGRAM(S): 25 INJECTION, SOLUTION SUBCUTANEOUS at 22:32

## 2024-10-01 RX ADMIN — Medication 0.4 MILLIGRAM(S): at 22:02

## 2024-10-01 RX ADMIN — METHOTREXATE 2.5 MILLIGRAM(S): 25 INJECTION, SOLUTION SUBCUTANEOUS at 11:44

## 2024-10-01 RX ADMIN — FOLIC ACID 2 MILLIGRAM(S): 1 TABLET ORAL at 14:00

## 2024-10-01 RX ADMIN — Medication 75 MILLILITER(S): at 10:25

## 2024-10-01 RX ADMIN — Medication 500 MILLIGRAM(S): at 22:02

## 2024-10-01 RX ADMIN — METHOTREXATE 7.5 MILLIGRAM(S): 25 INJECTION, SOLUTION SUBCUTANEOUS at 17:35

## 2024-10-01 NOTE — PHYSICAL THERAPY INITIAL EVALUATION ADULT - PERTINENT HX OF CURRENT PROBLEM, REHAB EVAL
HPI: Patient is a 84F with PMH of HTN, HLD, PUD s/p cauterization, RA/OA, CKD presents with c/o pain across lower back x 1 week. Pt states it started in the L hip but now is across the whole lower back. Pt states it is too painful to walk. Pt states it feels better when sitting or lying down. Pain medicine services now consulted. Imaging:XR R hip/femur/knee: no acute fractures. Hardware intact in stable position with no evidence of loosening migration. CT Pelvis/Lumbar Spine: no acute fractures of the pelvis. MRI: Abnormal signal at the left sacrum. Neurosurgery recs - no further acute neurosurgical intervention, recc ortho for L hip and L sacral insufficiency. Ortho note: No acute orthopedic intervention at this time.
85 yo female with PMHx HTN, HLD, PUD s/p cauterization, RA/OA, CKD presents with c/o pain across lower back x 1 week. Pt states it started in the L hip but now is across the whole lower back. Pt states it is too painful to walk. Pt states it feels better when sitting or lying down. She denies any trauma or precipitating event. Pt has also had an unintentional weight loss of 10 pounds in the last 2 weeks. Denies fever/chills, malaise or lethargy.

## 2024-10-01 NOTE — PHYSICAL THERAPY INITIAL EVALUATION ADULT - ADDITIONAL COMMENTS
As per pt s he lives in  W/ 2 Steps to enter W/ BL HR and  All in one level inside the  house , Daughter lives upstairs ,. as per pt she use RW for all functional activity and   need physical assistance  W/  ADLS  Which her daughter or Son in low provides her.
Pt lives alone in a 2 family house, 2 steps entry to the house, no steps inside. Pt daughter lives upstairs.

## 2024-10-01 NOTE — CONSULT NOTE ADULT - SUBJECTIVE AND OBJECTIVE BOX
HPI:  83 yo female with PMHx HTN, HLD, PUD s/p cauterization, RA/OA, CKD presents with c/o pain across lower back x 1 week. Pt states it started in the L hip but now is across the whole lower back. Pt states it is too painful to walk. Pt states it feels better when sitting or lying down. She denies any trauma or precipitating event. Pt has also had an unintentional weight loss of 10 pounds in the last 2 weeks. Denies fever/chills, malaise or lethargy.  (28 Sep 2024 14:42)    Pt seen and examined at bedside in room. Pt awake, alert, gives good history. Pt sts she's had L sided buttock pain that started 1 week ago that now travels in a band across her lower back. Pt sts pain radiates to b/l buttocks and thighs and ends at knees. Pt has had a R THR, b/l TKR in the past. Pt also c/o urinary retention x 1 day. Currently wearing a prima-fit but says she doesn't feel the urge to urinate although she denies saddle anesthesia. Also denies radicular type pain, numbness or tingling of LE's. Pt previously used a cane for ambulation but now using a walker for past week.         PAST MEDICAL & SURGICAL HISTORY:  Hypertension      History of hip surgery      H/O knee surgery      Other irritable bowel syndrome      Mitral valve disorder      Arthritis      Basal cell carcinoma      CAD (coronary artery disease)      Chronic kidney disease (CKD)      PUD (peptic ulcer disease)      History of surgery  BILATERAL KNEE REPLACEMENT,    RIGHT HIP REPLACEMENT,  MARY IN RIGHT FEMUR,  CARDIAC STENTS X2,   CHOLECYSTECTOMY, right IOL          Imaging:  < from: CT Thoracic Spine No Cont (09.28.24 @ 16:57) >    IMPRESSION:  Multilevel degenerative changes within the lumbar spine resulting in   severe spinal canal stenosis and compression of the cauda equina.    < end of copied text >    Home Medications:  amLODIPine 10 mg oral tablet: 1 tab(s) orally once a day (30 Mar 2023 07:10)  Aspir 81 oral delayed release tablet: 1 tab(s) orally once a day (30 Mar 2023 07:10)  atorvastatin 80 mg oral tablet: 1 tab(s) orally once a day (at bedtime) (30 Mar 2023 07:10)  escitalopram 10 mg oral tablet: 1 tab(s) orally once a day (30 Mar 2023 07:10)  losartan-hydrochlorothiazide 100 mg-25 mg oral tablet: 1 tab(s) orally once a day (30 Mar 2023 07:10)  metoprolol tartrate 25 mg oral tablet: 1 tab(s) orally 2 times a day (30 Mar 2023 07:10)  Vitamin B12 1000 mcg oral tablet: 1 tab(s) orally once a day (30 Mar 2023 07:10)  Vitamin D3 125 mcg (5000 intl units) oral tablet: 1 tab(s) orally once a day (30 Mar 2023 07:10)      Allergies    penicillin (Rash; Anaphylaxis; Hives)    Intolerances        ROS:  [x  ] A ten-point review of systems is negative except as noted   [  ] Due to altered mental status/intubation, subjective information were not able to be obtained from the patient. History was obtained, to the extent possible, from review of the chart and collateral sources of information    MEDICATIONS  (STANDING):  amLODIPine   Tablet 10 milliGRAM(s) Oral daily  aspirin enteric coated 81 milliGRAM(s) Oral daily  atorvastatin 80 milliGRAM(s) Oral at bedtime  cyanocobalamin 1000 MICROGram(s) Oral daily  escitalopram 10 milliGRAM(s) Oral daily  heparin   Injectable 5000 Unit(s) SubCutaneous every 12 hours  influenza  Vaccine (HIGH DOSE) 0.5 milliLiter(s) IntraMuscular once  metoprolol tartrate 25 milliGRAM(s) Oral two times a day  pantoprazole    Tablet 40 milliGRAM(s) Oral before breakfast    MEDICATIONS  (PRN):  acetaminophen     Tablet .. 1000 milliGRAM(s) Oral every 8 hours PRN Temp greater or equal to 38C (100.4F), Mild Pain (1 - 3)  aluminum hydroxide/magnesium hydroxide/simethicone Suspension 30 milliLiter(s) Oral every 4 hours PRN Dyspepsia  melatonin 3 milliGRAM(s) Oral at bedtime PRN Insomnia  methocarbamol 500 milliGRAM(s) Oral every 8 hours PRN for moderate pain  ondansetron Injectable 4 milliGRAM(s) IV Push every 8 hours PRN Nausea and/or Vomiting  oxyCODONE    IR 10 milliGRAM(s) Oral every 8 hours PRN Severe Pain (7 - 10)      ICU Vital Signs Last 24 Hrs  T(C): 36.6 (29 Sep 2024 14:09), Max: 36.9 (28 Sep 2024 20:33)  T(F): 97.9 (29 Sep 2024 14:09), Max: 98.4 (28 Sep 2024 20:33)  HR: 83 (29 Sep 2024 14:09) (63 - 83)  BP: 103/49 (29 Sep 2024 14:09) (93/54 - 122/63)  BP(mean): --  ABP: --  ABP(mean): --  RR: 18 (29 Sep 2024 14:09) (18 - 18)  SpO2: 76% (29 Sep 2024 14:09) (76% - 92%)    O2 Parameters below as of 29 Sep 2024 14:09  Patient On (Oxygen Delivery Method): room air            I&O's Detail      CBC Full  -  ( 29 Sep 2024 07:51 )  WBC Count : 7.70 K/uL  RBC Count : 3.35 M/uL  Hemoglobin : 10.5 g/dL  Hematocrit : 34.0 %  Platelet Count - Automated : 418 K/uL  Mean Cell Volume : 101.5 fL  Mean Cell Hemoglobin : 31.3 pg  Mean Cell Hemoglobin Concentration : 30.9 g/dL  Auto Neutrophil # : 6.58 K/uL  Auto Lymphocyte # : 0.64 K/uL  Auto Monocyte # : 0.35 K/uL  Auto Eosinophil # : 0.05 K/uL  Auto Basophil # : 0.03 K/uL  Auto Neutrophil % : 85.6 %  Auto Lymphocyte % : 8.3 %  Auto Monocyte % : 4.5 %  Auto Eosinophil % : 0.6 %  Auto Basophil % : 0.4 %    09-29    139  |  101  |  32[H]  ----------------------------<  113[H]  4.4   |  25  |  1.5    Ca    8.7      29 Sep 2024 07:51  Phos  4.6     09-29  Mg     2.0     09-29    TPro  6.7  /  Alb  3.5  /  TBili  0.4  /  DBili  x   /  AST  63[H]  /  ALT  62[H]  /  AlkPhos  210[H]  09-29        Urinalysis Basic - ( 29 Sep 2024 07:51 )    Color: x / Appearance: x / SG: x / pH: x  Gluc: 113 mg/dL / Ketone: x  / Bili: x / Urobili: x   Blood: x / Protein: x / Nitrite: x   Leuk Esterase: x / RBC: x / WBC x   Sq Epi: x / Non Sq Epi: x / Bacteria: x      Neuro exam:   AAOX3. Verbal function intact  follows commands  Motor: MAEx4  5-/5 power in b/l UE  4+/5 power in b/l LE  some baseline weakness 2/2 TKR  5/5 power DF/PF  diminished DTR b/l  Sensation: intact to light touch, equal b/l          Assessment/Plan:  No acute neurosurgical intervention  Obtain MRI lumbar spine if no contraindications (i.e.- previous hardware, cardiac stents)  If MRI can't be obtained- get CT myelogram lumbar spine  recommend PT/OT/rehab  Pain management consult  Would recommend bladder scan for PVR- straight cath if vol >350  d/w Attg
ORTHOPAEDIC SURGERY CONSULT NOTE    Reason for Consult: Left Sacral insufficiency fx    HPI: 84yFemalzack presents with right sided lower back pain that has been present for 2 weeks. Denies any acute falls or trauma. Reports the pain radiates from her back to her buttock. Reports that 3 months ago she had a glf where she sustained 6 left sided rib fractures. Reports she ambulates with a walker for assistance and is a household ambulator. Denies paresthesias. Denies fevers/chills.    PAST MEDICAL & SURGICAL HISTORY:  Hypertension  History of hip surgery  H/O knee surgery  Other irritable bowel syndrome  Mitral valve disorder  Arthritis  Basal cell carcinoma  CAD (coronary artery disease)  Chronic kidney disease (CKD)  Rheumatoid Arthritis  PUD (peptic ulcer disease)      History of surgery  BILATERAL KNEE REPLACEMENT,  RIGHT HIP REPLACEMENT,  MARY IN RIGHT FEMUR,  CARDIAC STENTS X2,   CHOLECYSTECTOMY, right IOL        Allergies: penicillin (Rash; Anaphylaxis; Hives)    Medications: acetaminophen     Tablet .. 1000 milliGRAM(s) Oral every 8 hours PRN  aluminum hydroxide/magnesium hydroxide/simethicone Suspension 30 milliLiter(s) Oral every 4 hours PRN  aspirin enteric coated 81 milliGRAM(s) Oral daily  atorvastatin 80 milliGRAM(s) Oral at bedtime  cyanocobalamin 1000 MICROGram(s) Oral daily  escitalopram 10 milliGRAM(s) Oral daily  heparin   Injectable 5000 Unit(s) SubCutaneous every 12 hours  influenza  Vaccine (HIGH DOSE) 0.5 milliLiter(s) IntraMuscular once  melatonin 3 milliGRAM(s) Oral at bedtime PRN  methocarbamol 500 milliGRAM(s) Oral every 8 hours PRN  metoprolol tartrate 25 milliGRAM(s) Oral two times a day  ondansetron Injectable 4 milliGRAM(s) IV Push every 8 hours PRN  oxyCODONE    IR 10 milliGRAM(s) Oral every 8 hours PRN  pantoprazole    Tablet 40 milliGRAM(s) Oral before breakfast  tamsulosin 0.4 milliGRAM(s) Oral at bedtime      PHYSICAL EXAM:  Vital Signs Last 24 Hrs  T(C): 36.7 (30 Sep 2024 14:03), Max: 37.4 (30 Sep 2024 05:41)  T(F): 98 (30 Sep 2024 14:03), Max: 99.4 (30 Sep 2024 05:41)  HR: 83 (30 Sep 2024 14:03) (83 - 89)  BP: 113/57 (30 Sep 2024 14:03) (102/51 - 120/62)  BP(mean): 82 (29 Sep 2024 20:50) (82 - 82)  RR: 18 (30 Sep 2024 14:03) (18 - 18)  SpO2: 95% (30 Sep 2024 05:41) (95% - 95%)    Parameters below as of 29 Sep 2024 20:50  Patient On (Oxygen Delivery Method): room air        Physical Exam:  Alert, NAD  Resp: NLB on RA.    PELVIS: No open skin or wounds. Pelvis stable to AP and Lat compression. Able to actively SLR bilaterally.     RUE:  No open skin or wounds  NTTP shoulder, upper arm, elbow, forearm, wrist or hand  Full baseline painless ROM at shoulder, elbow, wrist, and   SILT in axillary, musculocutaneous,  radial, median, and ulnar distributions.   AIN/PIN/U motor intact  2+ radial pulse with brisk cap refill at distal finger tips.       LUE:  No open skin or wounds  NTTP shoulder, upper arm, elbow, forearm, wrist or hand  Full baseline painless ROM at shoulder, elbow, wrist, and   SILT in axillary, musculocutaneous,  radial, median, and ulnar distributions.   AIN/PIN/U motor intact  2+ radial pulse with brisk cap refill at distal finger tips.       RLE:  No open skin or wounds  Surgical scar to R hip and R knee c/d/i  No erythema at the hip/femur/knee  NTTP hip, thigh, knee, leg, ankle or foot.   Full baseline painless ROM at hip, knee, ankle and toes   No pain with log-roll or axial compression  Able to actively SLR.  SILT DP/SP/T/Das/Sa.   EHL/FHL/TA/Gs motor intact.  2+ DP/PT pulses with brisk cap refill distally.      LLE:   No open skin or wounds  Surgical scar to left knee c/d/i  NTTP hip, thigh, knee, leg, ankle or foot.   Full baseline painless ROM at hip, knee, ankle and toes   No pain with log-roll or axial compression  Able to actively SLR.  SILT DP/SP/T/Das/Sa.   EHL/FHL/TA/Gs motor intact.  2+ DP/PT pulses with brisk cap refill distally.      Labs:                        9.2    6.65  )-----------( 364      ( 30 Sep 2024 13:07 )             29.6     09-30    132[L]  |  98  |  37[H]  ----------------------------<  105[H]  4.0   |  23  |  2.0[H]    Ca    7.9[L]      30 Sep 2024 13:07  Phos  3.2     09-30  Mg     1.7     09-30    TPro  5.8[L]  /  Alb  3.4[L]  /  TBili  0.3  /  DBili  x   /  AST  37  /  ALT  55[H]  /  AlkPhos  170[H]  09-30        Imaging:  XR R hip/femur/knee: no acute fractures. Hardware intact in stable position with no evidence of loosening migration.   CT Pelvis/Lumbar Spine: no acute fractures of the pelvis.   MRI: Abnormal signal at the left sacrum.       A/P: 84y Female presenting with acute atraumatic right lower back pain. No concern for septic joint or acute hardware loosening or migration. No acute orthopedic intervention at this time.     - Activity: WBAT B/L LE  - Bone Health Labs: Vit D, Prealbumin, PTH, TSH  - Inflammatory labs: Esr, Crp, HgbA1c  - Pain control prn  - PT/OT/Rehab  - Patient instructed to return to ED if any worsening pain, numbness, tingling, fevers, chills or any other concerning symptoms  - F/u wit PCP for management of Hx of Osteopenia, consider updated DEXA scan  - F/u with Orthopedics outpatient with Dr. Yepez. Please call [phone].      
HPI: Patient is a 84F with PMH of HTN, HLD, PUD s/p cauterization, RA/OA, CKD presents with c/o pain across lower back x 1 week. Pt states it started in the L hip but now is across the whole lower back. Pt states it is too painful to walk. Pt states it feels better when sitting or lying down. Pain medicine services now consulted.     Patient seen and evaluated at bedside by me. Patient endorsing worsening low back pain, states the right side feels worse than the left despite the fracture.     Current Inpatient Medication Regimen:  acetaminophen     Tablet .. 1000 milliGRAM(s) Oral every 8 hours PRN  aluminum hydroxide/magnesium hydroxide/simethicone Suspension 30 milliLiter(s) Oral every 4 hours PRN  aspirin enteric coated 81 milliGRAM(s) Oral daily  atorvastatin 80 milliGRAM(s) Oral at bedtime  cyanocobalamin 1000 MICROGram(s) Oral daily  escitalopram 10 milliGRAM(s) Oral daily  heparin   Injectable 5000 Unit(s) SubCutaneous every 12 hours  influenza  Vaccine (HIGH DOSE) 0.5 milliLiter(s) IntraMuscular once  melatonin 3 milliGRAM(s) Oral at bedtime PRN  methocarbamol 500 milliGRAM(s) Oral every 8 hours PRN  methotrexate 2.5 milliGRAM(s) Oral every week  metoprolol tartrate 25 milliGRAM(s) Oral two times a day  ondansetron Injectable 4 milliGRAM(s) IV Push every 8 hours PRN  oxyCODONE    IR 10 milliGRAM(s) Oral every 8 hours PRN  pantoprazole    Tablet 40 milliGRAM(s) Oral before breakfast  polyethylene glycol 3350 17 Gram(s) Oral two times a day  senna 2 Tablet(s) Oral at bedtime  tamsulosin 0.4 milliGRAM(s) Oral at bedtime      Home Analgesic Regimen:      Allergies:  penicillin (Rash; Anaphylaxis; Hives)      Past Medical History:  Low back pain    Hypertension    History of hip surgery    H/O knee surgery    Other irritable bowel syndrome    Mitral valve disorder    Arthritis    Basal cell carcinoma    CAD (coronary artery disease)    Chronic kidney disease (CKD)    PUD (peptic ulcer disease)    Back pain    History of surgery    HIP PAIN        Review of Systems:  General: no fevers or chills  Eyes: no diplopia or blurred vision  ENT: no rhinorrhea  CV: no chest pain  Resp: no cough or dyspnea  GI: no abdominal pain, constipation, or diarrhea  : brennan catheter in place  Neuro: [ no ] weakness, [ no ] numbness, [ no ] headache  Psych: [ no ] depression, [ no ] anxiety    Physical Exam:  T(C): 36.6 (10-01-24 @ 05:22), Max: 36.8 (09-30-24 @ 20:10)  HR: 86 (10-01-24 @ 08:01) (77 - 86)  BP: 100/58 (10-01-24 @ 08:01) (92/54 - 113/57)  RR: 16 (10-01-24 @ 08:01) (16 - 18)  SpO2: --  Gen: NAD  Eyes: [ no ] glasses, [ no ] scleral icterus  Head: [ x ] Normocephalic / Atraumatic  CV: no JVD  Lungs: nonlabored breathing  Abdomen: [ non ] distended, [ x ] soft  : [ x ] brennan catheter in place  Back: [ x ] tenderness to palpation  Neuro: [ x ] AOx3  Extremities: [ x ] full ROM in upper/lower extremities  Psych: [ x ] normal affect      Labs:  CBC  5.93 K/uL [4.80 - 10.80] > 8.9 g/dL[L] [12.0 - 16.0] / 28.9 %[L] [37.0 - 47.0] < 309 K/uL [130 - 400]      BMP  132 mmol/L[L] [135 - 146] | 98 mmol/L [98 - 110] | 45 mg/dL[H] [10 - 20]  3.9 mmol/L [3.5 - 5.0] | 23 mmol/L [17 - 32] | 2.1 mg/dL[H] [0.7 - 1.5]    108 mg/dL[H] [70 - 99]  CBC  6.65 K/uL [4.80 - 10.80] > 9.2 g/dL[L] [12.0 - 16.0] / 29.6 %[L] [37.0 - 47.0] < 364 K/uL [130 - 400]      BMP  132 mmol/L[L] [135 - 146] | 98 mmol/L [98 - 110] | 37 mg/dL[H] [10 - 20]  4.0 mmol/L [3.5 - 5.0] | 23 mmol/L [17 - 32] | 2.0 mg/dL[H] [0.7 - 1.5]    105 mg/dL[H] [70 - 99]        Imaging Studies:    ACC: [id number] EXAM:  MR SPINE LUMBAR   ORDERED BY: KEZIA ALLEN     PROCEDURE DATE:  09/30/2024          INTERPRETATION:  INDICATIONS:  Evaluate lumbar spinal stenosis. Back pain    TECHNIQUE:  Multiplanar multi sequential images of the lumbosacral spine   were obtained.    COMPARISON EXAMINATION: CT lumbar spine dated 9/28/2024.    FINDINGS:  There is transitional vertebral anatomy with lumbarized S1 vertebral   body. Last well-formed disc space will be to L5-S1 on series 6 image 34.    VERTEBRAL BODIES AND DISCS:  There is marrow edema noted of the left   sacrum. There are mixed Modic type I and II endplate degenerative changes   noted at L1-L2, L2-L3 as well as L3-4. Vertebral marrow signal is   otherwise unremarkable.    ALIGNMENT:  There is lumbar levoscoliosis.    L1-L2 LEVEL:  There is disc bulging, ligamentum flavum hypertrophy as   well as facet arthrosis contributing to moderate spinal stenosis as well   as mild foraminal narrowing.    L2-L3 LEVEL:  There is disc bulging, ligament flavum hypertrophy as well   as facet arthrosis contributing to moderate spinal stenosis as well as   severe left and mild right foraminal narrowing..    L3-L4 LEVEL:  There is disc bulging, ligamentum flavum hypertrophy as   well as facet arthrosis contributing to moderate spinal stenosis as well   as mild foraminal narrowing    L4-L5 LEVEL:  There is disc as well as facet arthrosis contributing to   mild bilateral foraminal narrowing as well as mild spinal stenosis    L5-S1 LEVEL:  There is bilateral facet arthrosis contributing to moderate   left foraminal narrowing.    SPINAL CANAL:  No intradural or extradural defects are seen.    CONUS MEDULLARIS:  Unremarkable terminating at the L1-L2 level.    MISCELLANEOUS:  Multiple bilateral renal cysts noted.    Partially visualized uterine fibroids.      IMPRESSION:  Marrow edema noted of the left aspect of the sacrum suggestive of an   insufficiency fracture. No discrete fracture line is identified.    Multilevel degenerative changes in combination with lumbar levoscoliosis   contributing to moderate spinal stenosis as well as bilateral foraminal   narrowing, most significant at the L2-L3 and L3-L4 levels.    --- End of Report ---            AVERY FARRIS MD; Attending Radiologist  This document has been electronically signed. Sep 30 2024  9:40AM      Opioid Risk Assessment Tool                                                                           Female       Male  Family History  Alcohol                                                              1                3  Illegal drugs                                                       2                3  Rx drugs                                                            4                4    Personal History   Alcohol                                                              3                3  Illegal drugs                                                       4                4  Rx drugs                                                            5                5    Age between 16—45 years                                1                1  History of preadolescent sexual abuse               3                0    Psychological disease  ADD, OCD, bipolar, schizophrenia                      2                2  Depression                                                       1                1    Total Score                                                     0              __    0 - 3 = low risk for future opioid abuse  4 - 7 = moderate risk for future opioid abuse  8+ = high risk for future opioid abuse

## 2024-10-01 NOTE — PHYSICAL THERAPY INITIAL EVALUATION ADULT - IMPAIRMENTS FOUND, PT EVAL
aerobic capacity/endurance/gait, locomotion, and balance/muscle strength/poor safety awareness
gait, locomotion, and balance/muscle strength

## 2024-10-01 NOTE — PHYSICAL THERAPY INITIAL EVALUATION ADULT - GAIT DEVIATIONS NOTED, PT EVAL
decreased charleen/decreased step length/decreased stride length
decreased charleen/decreased step length

## 2024-10-01 NOTE — PHYSICAL THERAPY INITIAL EVALUATION ADULT - CRITERIA FOR SKILLED THERAPEUTIC INTERVENTIONS
impairments found/functional limitations in following categories/rehab potential/therapy frequency/predicted duration of therapy intervention/anticipated equipment needs at discharge/anticipated discharge recommendation
impairments found

## 2024-10-01 NOTE — CONSULT NOTE ADULT - ASSESSMENT
A/P: Patient is a 84F with PMH of HTN, HLD, PUD s/p cauterization, RA/OA, CKD presents with c/o pain across lower back x 1 week. Pt states it started in the L hip but now is across the whole lower back. Pt states it is too painful to walk. Pt states it feels better when sitting or lying down. Pain medicine services now consulted.     Patient seen and evaluated at bedside by me. Patient endorsing worsening low back pain, states the right side feels worse than the left despite the fracture.     #Low back pain  #Sacroiliitis   #Left Sacral Insufficiency Fracture  - Methocarbamol 500mg q8h standing  - Acetaminophen 650mg q6h standing  - Lidocaine patch to painful area  - Oxycodone 10mg q8h PRN for severe breakthrough pain  - Ibuprofen 400mg q6h PRN   - PT/OT    #opioid induced constipation  - Bowel regimen as per primary team

## 2024-10-01 NOTE — PHYSICAL THERAPY INITIAL EVALUATION ADULT - GENERAL OBSERVATIONS, REHAB EVAL
9:53-10:22am Pt transferred from [facility]. Encountered supine in bed, A & O x 4 in NAD, +brennan, no c/o pain and agreeable to PT. Pt requires CGA in bed mobility and Min A in transfer mobility. Pt ambulated 30 ft CGA using RW with decreased charleen, demonstrated minimal gait instability and c/o fatigue after ambulation. Pt left seated OOB in chair after PT session. Pt will benefit from skilled PT 3-5x/wk for thera ex, functional mobility training, balance act and gait training to improve mobility status to facilitate return to previous level of function.
9:00 -9:30 Chart reviewed. Order received.  Patient is ok to be  seen for Pt, confirmed with ROHINI Pacheco and  RN. pt encountered  Semi humphreys in bed  denies pain at Rest  but pain W/ activity at BL Hip and  Lumbar Spine , and agrees to participate in session, +  VIVIENNE Durán.

## 2024-10-01 NOTE — PROGRESS NOTE ADULT - ASSESSMENT
/83 yo female with lower back pain and inability to ambulate    # Lower back pain r/o cauda equina syndrome?  - cont Robaxin Q8h  - Oxycodone IR PRN mod- severe pain  - Tylenol 1gm Q8h  - CT T-L-S spine - cauda equina compression  - neurosurgery recs - no further acute neurosurgical intervention, recc ortho for L hip and L sacral insufficiency fx, pain management consult, F/U outpatient with Dr. Garner for poss candidate Intracept procedure outpatient- after above conservative management with outpatient PT and pain management  - bladder scan q6 and straight cath if retaining >350cc  - 9/30 overnight straight cath x2 -> inserted brennan  - started flomax  - ortho recs pending - [facility] (Vit D, Prealbumin, PTH, TSH), Inflammatory labs (Esr, Crp, HgbA1c), DEXA scan outpt?, f/u w/ ortho outpt w/ Dr. Yepez  - pain management recs pending  - bone health labs pending  - inflammatory labs pending  - monitor LFTs    # B/l LE pain  - b/l LE venous duplex pending    # HTN  - monitor BP  - hold amlodipine  - cont metoprolol    # HLD  - cont atorvastatin  - low fat diet    # RA/OA  - cont methotrexate with folic acid weekly on tuesdays    # Depression  - cont Lexapro    # PUD  - Protonix daily    # CAD  - cont ASA      #DVT prophylaxis: Heparin  #GI prophylaxis: PPI  #Diet: DASH/TLC  #Activity: AAT  #Code status: Full Code  #Disposition: TBD    #Pending: ortho, pain management, b/l LE venous duplex, monitor LFTs, bone health labs, inflammatory labs /85 yo female with lower back pain and inability to ambulate    # Lower back pain r/o cauda equina syndrome?  - cont Robaxin Q8h  - Oxycodone IR PRN mod- severe pain  - Tylenol 1gm Q8h  - CT T-L-S spine - cauda equina compression  - neurosurgery recs - no further acute neurosurgical intervention, recc ortho for L hip and L sacral insufficiency fx, pain management consult, F/U outpatient with Dr. Garner for poss candidate Intracept procedure outpatient- after above conservative management with outpatient PT and pain management  - bladder scan q6 and straight cath if retaining >350cc  - 9/30 overnight straight cath x2 -> inserted brennan  - started flomax  - ortho recs pending - [facility] (Vit D, Prealbumin, PTH, TSH), Inflammatory labs (Esr, Crp, HgbA1c), DEXA scan outpt?, f/u w/ ortho outpt w/ Dr. Yepez  - pain management recs pending  - bone health labs pending  - inflammatory labs pending  - monitor LFTs  - RUQ US pending    # B/l LE pain  - b/l LE venous duplex pending    #ESTEFANY  - Cr 1.2 (9/28) -> 2.1 (10/1)  - RBUS pending    # HTN  - monitor BP  - hold amlodipine  - cont metoprolol    # HLD  - cont atorvastatin  - low fat diet    # RA/OA  - cont methotrexate with folic acid weekly on tuesdays    # Depression  - cont Lexapro    # PUD  - Protonix daily    # CAD  - cont ASA      #DVT prophylaxis: Heparin  #GI prophylaxis: PPI  #Diet: DASH/TLC  #Activity: AAT  #Code status: Full Code  #Disposition: TBD    #Pending: ortho, pain management, b/l LE venous duplex, monitor LFTs, bone health labs, inflammatory labs, RBUS, RUQ US /83 yo female with lower back pain and inability to ambulate    # Lower back pain r/o cauda equina syndrome?  - cont Robaxin Q8h  - Oxycodone IR PRN mod- severe pain  - Tylenol 1gm Q8h  - CT T-L-S spine - cauda equina compression  - neurosurgery recs - no further acute neurosurgical intervention, recc ortho for L hip and L sacral insufficiency fx, pain management consult, F/U outpatient with Dr. Garner for poss candidate Intracept procedure outpatient- after above conservative management with outpatient PT and pain management  - bladder scan q6 and straight cath if retaining >350cc  - 9/30 overnight straight cath x2 -> inserted brennan  - started flomax  - ortho recs pending - [facility] (Vit D, Prealbumin, PTH, TSH), Inflammatory labs (Esr, Crp, HgbA1c), DEXA scan outpt?, f/u w/ ortho outpt w/ Dr. Yepez  - pain management recs pending  - bone health labs pending  - inflammatory labs pending  - monitor LFTs  - RUQ US pending    # B/l LE pain  - b/l LE venous duplex pending    #ESTEFANY  - Cr 1.2 (9/28) -> 2.1 (10/1)  - RBUS pending  - started on gentle hydration    # HTN  - monitor BP  - hold amlodipine  - cont metoprolol    # HLD  - cont atorvastatin  - low fat diet    # RA/OA  - cont methotrexate with folic acid weekly on tuesdays    # Depression  - cont Lexapro    # PUD  - Protonix daily    # CAD  - cont ASA      #DVT prophylaxis: Heparin  #GI prophylaxis: PPI  #Diet: DASH/TLC  #Activity: AAT  #Code status: Full Code  #Disposition: TBD    #Pending: ortho, pain management, b/l LE venous duplex, monitor LFTs, bone health labs, inflammatory labs, RBUS, RUQ US /85 yo female with lower back pain and inability to ambulate    # Lower back pain r/o cauda equina syndrome?  - cont Robaxin Q8h  - Oxycodone IR PRN mod- severe pain  - Tylenol 1gm Q8h  - CT T-L-S spine - cauda equina compression  - neurosurgery recs - no further acute neurosurgical intervention, recc ortho for L hip and L sacral insufficiency fx, pain management consult, F/U outpatient with Dr. Garner for poss candidate Intracept procedure outpatient- after above conservative management with outpatient PT and pain management  - bladder scan q6 and straight cath if retaining >350cc  - 9/30 overnight straight cath x2 -> inserted brennan  - started flomax  - ortho recs pending - [facility] (Vit D, Prealbumin, PTH, TSH), Inflammatory labs (Esr, Crp, HgbA1c), DEXA scan outpt?, f/u w/ ortho outpt w/ Dr. Yepez  - pain management recs appreciated - Methocarbamol 500mg q8h standing, Acetaminophen 650mg q6h standing, Lidocaine patch to painful area, Oxycodone 10mg q8h PRN for severe breakthrough pain, Ibuprofen 400mg q6h PRN (hold off due to elevated Cr), PT/OT  - bone health labs pending  - inflammatory labs pending  - monitor LFTs  - RUQ US pending    # B/l LE pain  - b/l LE venous duplex pending    #ESTEFANY  - Cr 1.2 (9/28) -> 2.1 (10/1)  - RBUS pending  - started on gentle hydration    # HTN  - monitor BP  - hold amlodipine  - cont metoprolol    # HLD  - cont atorvastatin  - low fat diet    # RA/OA  - cont methotrexate with folic acid weekly on tuesdays    # Depression  - cont Lexapro    # PUD  - Protonix daily    # CAD  - cont ASA      #DVT prophylaxis: Heparin  #GI prophylaxis: PPI  #Diet: DASH/TLC  #Activity: AAT  #Code status: Full Code  #Disposition: TBD    #Pending: ortho, b/l LE venous duplex, monitor LFTs, bone health labs, inflammatory labs, RBUS, RUQ US

## 2024-10-01 NOTE — PROGRESS NOTE ADULT - ATTENDING COMMENTS
Patient seen at bedside. Changes made within note as well. Discussed with medical team that includes resident(s), medical student(s), nursing staff, case management.     as per initial summary   83 yo female with lower back pain and inability to ambulate    patient seen at [facility]. she told me there is some improvement in back pain. now has Brennan's as unable to urinate     # Lower back pain r/o cauda equina syndrome?  - cont Robaxin Q8h  - Oxycodone IR PRN mod- severe pain  - Tylenol   - CT T-L-S spine - cauda equina compression?  MRI lumbar reported Marrow edema noted of the left aspect of the sacrum suggestive of an   insufficiency fracture. No discrete fracture line is identified.  Multilevel degenerative changes in combination with lumbar levoscoliosis   contributing to moderate spinal stenosis as well as bilateral foraminal   narrowing, most significant at the L2-L3 and L3-L4 levels  - neurosurgery recs - no further acute neurosurgical intervention, recc ortho for L hip and L sacral insufficiency fx, pain management consult, F/U outpatient with Dr. Garner for poss candidate Intracept procedure outpatient- after above conservative management with outpatient PT and pain management  - 9/30 overnight straight cath x2 -> inserted brennan  - started  - ortho recs- [facility] (Vit D, Prealbumin, PTH, TSH), Inflammatory labs (Esr, Crp, HgbA1c), DEXA scan outpt?, f/u w/ ortho outpt w/ Dr. Yepez  follow   - pain management follow reocmmendations   - bone health labs pending  - inflammatory labs pending  - monitor LFTs    #elevated serum creatinine  started on gentle hydration   daily BMP  patient told me that she has not been drinking enough    #elevated lfts  now downtrending  RUQ US pending     # B/l LE pain now better  - b/l LE venous duplex pending    # HTN  - monitor BP  - hold amlodipine  - cont metoprolol    # HLD  - cont atorvastatin  - low fat diet    # RA/OA  - cont methotrexate with folic acid weekly on tuesdays    # Depression  - cont Lexapro    # PUD  - Protonix daily    # CAD  - cont ASA      #DVT prophylaxis: Heparin  #GI prophylaxis: PPI  #Diet: DASH/TLC  #Activity: AAT  #Code status: Full Code  #Disposition: TBD    #Pending: ortho, pain management, b/l LE venous duplex, renal US , right upper quadrant usg, monitor LFTs, follow pending labs   started on iv fluids due to worsening creatinine   follow PT.

## 2024-10-02 ENCOUNTER — TRANSCRIPTION ENCOUNTER (OUTPATIENT)
Age: 84
End: 2024-10-02

## 2024-10-02 LAB
ALBUMIN SERPL ELPH-MCNC: 3 G/DL — LOW (ref 3.5–5.2)
ALP SERPL-CCNC: 128 U/L — HIGH (ref 30–115)
ALT FLD-CCNC: 33 U/L — SIGNIFICANT CHANGE UP (ref 0–41)
ANION GAP SERPL CALC-SCNC: 10 MMOL/L — SIGNIFICANT CHANGE UP (ref 7–14)
AST SERPL-CCNC: 25 U/L — SIGNIFICANT CHANGE UP (ref 0–41)
BASOPHILS # BLD AUTO: 0.04 K/UL — SIGNIFICANT CHANGE UP (ref 0–0.2)
BASOPHILS NFR BLD AUTO: 0.4 % — SIGNIFICANT CHANGE UP (ref 0–1)
BILIRUB SERPL-MCNC: 0.3 MG/DL — SIGNIFICANT CHANGE UP (ref 0.2–1.2)
BUN SERPL-MCNC: 46 MG/DL — HIGH (ref 10–20)
CALCIUM SERPL-MCNC: 8 MG/DL — LOW (ref 8.4–10.5)
CALCIUM SERPL-MCNC: 8.3 MG/DL — LOW (ref 8.4–10.5)
CHLORIDE SERPL-SCNC: 103 MMOL/L — SIGNIFICANT CHANGE UP (ref 98–110)
CO2 SERPL-SCNC: 23 MMOL/L — SIGNIFICANT CHANGE UP (ref 17–32)
CREAT SERPL-MCNC: 1.5 MG/DL — SIGNIFICANT CHANGE UP (ref 0.7–1.5)
EGFR: 34 ML/MIN/1.73M2 — LOW
EOSINOPHIL # BLD AUTO: 0.19 K/UL — SIGNIFICANT CHANGE UP (ref 0–0.7)
EOSINOPHIL NFR BLD AUTO: 1.8 % — SIGNIFICANT CHANGE UP (ref 0–8)
GLUCOSE SERPL-MCNC: 96 MG/DL — SIGNIFICANT CHANGE UP (ref 70–99)
HCT VFR BLD CALC: 28.7 % — LOW (ref 37–47)
HGB BLD-MCNC: 8.8 G/DL — LOW (ref 12–16)
IMM GRANULOCYTES NFR BLD AUTO: 0.9 % — HIGH (ref 0.1–0.3)
LYMPHOCYTES # BLD AUTO: 0.79 K/UL — LOW (ref 1.2–3.4)
LYMPHOCYTES # BLD AUTO: 7.6 % — LOW (ref 20.5–51.1)
MAGNESIUM SERPL-MCNC: 2.1 MG/DL — SIGNIFICANT CHANGE UP (ref 1.8–2.4)
MCHC RBC-ENTMCNC: 30.7 G/DL — LOW (ref 32–37)
MCHC RBC-ENTMCNC: 31.8 PG — HIGH (ref 27–31)
MCV RBC AUTO: 103.6 FL — HIGH (ref 81–99)
MONOCYTES # BLD AUTO: 1.09 K/UL — HIGH (ref 0.1–0.6)
MONOCYTES NFR BLD AUTO: 10.4 % — HIGH (ref 1.7–9.3)
NEUTROPHILS # BLD AUTO: 8.25 K/UL — HIGH (ref 1.4–6.5)
NEUTROPHILS NFR BLD AUTO: 78.9 % — HIGH (ref 42.2–75.2)
NRBC # BLD: 0 /100 WBCS — SIGNIFICANT CHANGE UP (ref 0–0)
PLATELET # BLD AUTO: 320 K/UL — SIGNIFICANT CHANGE UP (ref 130–400)
PMV BLD: 9.8 FL — SIGNIFICANT CHANGE UP (ref 7.4–10.4)
POTASSIUM SERPL-MCNC: 4.7 MMOL/L — SIGNIFICANT CHANGE UP (ref 3.5–5)
POTASSIUM SERPL-SCNC: 4.7 MMOL/L — SIGNIFICANT CHANGE UP (ref 3.5–5)
PREALB SERPL-MCNC: 14 MG/DL — LOW (ref 20–40)
PROT SERPL-MCNC: 5.3 G/DL — LOW (ref 6–8)
PTH-INTACT FLD-MCNC: 48 PG/ML — SIGNIFICANT CHANGE UP (ref 15–65)
RBC # BLD: 2.77 M/UL — LOW (ref 4.2–5.4)
RBC # FLD: 15.4 % — HIGH (ref 11.5–14.5)
SODIUM SERPL-SCNC: 136 MMOL/L — SIGNIFICANT CHANGE UP (ref 135–146)
WBC # BLD: 10.45 K/UL — SIGNIFICANT CHANGE UP (ref 4.8–10.8)
WBC # FLD AUTO: 10.45 K/UL — SIGNIFICANT CHANGE UP (ref 4.8–10.8)

## 2024-10-02 PROCEDURE — 99232 SBSQ HOSP IP/OBS MODERATE 35: CPT

## 2024-10-02 RX ORDER — CHLORHEXIDINE GLUCONATE ORAL RINSE 1.2 MG/ML
1 SOLUTION DENTAL
Refills: 0 | Status: DISCONTINUED | OUTPATIENT
Start: 2024-10-02 | End: 2024-10-03

## 2024-10-02 RX ADMIN — FOLIC ACID 2 MILLIGRAM(S): 1 TABLET ORAL at 11:52

## 2024-10-02 RX ADMIN — Medication 10 MILLIGRAM(S): at 11:53

## 2024-10-02 RX ADMIN — Medication 500 MILLIGRAM(S): at 21:14

## 2024-10-02 RX ADMIN — Medication 0.4 MILLIGRAM(S): at 21:14

## 2024-10-02 RX ADMIN — Medication 5000 UNIT(S): at 05:35

## 2024-10-02 RX ADMIN — Medication 650 MILLIGRAM(S): at 17:50

## 2024-10-02 RX ADMIN — Medication 25 MILLIGRAM(S): at 08:22

## 2024-10-02 RX ADMIN — Medication 500 MILLIGRAM(S): at 05:35

## 2024-10-02 RX ADMIN — Medication 650 MILLIGRAM(S): at 23:04

## 2024-10-02 RX ADMIN — Medication 17 GRAM(S): at 05:35

## 2024-10-02 RX ADMIN — Medication 650 MILLIGRAM(S): at 11:53

## 2024-10-02 RX ADMIN — Medication 81 MILLIGRAM(S): at 11:53

## 2024-10-02 RX ADMIN — Medication 500 MILLIGRAM(S): at 16:26

## 2024-10-02 RX ADMIN — PANTOPRAZOLE SODIUM 40 MILLIGRAM(S): 40 TABLET, DELAYED RELEASE ORAL at 05:34

## 2024-10-02 RX ADMIN — LIDOCAINE 1 PATCH: 50 CREAM TOPICAL at 11:52

## 2024-10-02 RX ADMIN — Medication 650 MILLIGRAM(S): at 05:34

## 2024-10-02 RX ADMIN — CHLORHEXIDINE GLUCONATE ORAL RINSE 1 APPLICATION(S): 1.2 SOLUTION DENTAL at 17:59

## 2024-10-02 RX ADMIN — ATORVASTATIN CALCIUM 80 MILLIGRAM(S): 10 TABLET, FILM COATED ORAL at 21:13

## 2024-10-02 RX ADMIN — Medication 5000 UNIT(S): at 17:51

## 2024-10-02 RX ADMIN — Medication 1000 MICROGRAM(S): at 11:53

## 2024-10-02 RX ADMIN — Medication 2 TABLET(S): at 21:14

## 2024-10-02 RX ADMIN — Medication 17 GRAM(S): at 17:51

## 2024-10-02 RX ADMIN — Medication 25 MILLIGRAM(S): at 17:51

## 2024-10-02 NOTE — DISCHARGE NOTE PROVIDER - NSDCFUSCHEDAPPT_GEN_ALL_CORE_FT
Pelon Velasquez  [facility]ician [facility]  [address]  Scheduled Appointment: 10/17/2024    Clarisse Garner  [facility] Physician [facility]  NEUROSURG [address]  Scheduled Appointment: 10/21/2024    
IV intact

## 2024-10-02 NOTE — DISCHARGE NOTE PROVIDER - NSDCCPCAREPLAN_GEN_ALL_CORE_FT
PRINCIPAL DISCHARGE DIAGNOSIS  Diagnosis: Back pain  Assessment and Plan of Treatment: 83 yo female with PMHx HTN, HLD, PUD s/p cauterization, RA/OA, CKD presents with c/o pain across lower back x 1 week. Pt states it started in the L hip but now is across the whole lower back. Pt states it is too painful to walk. Pt states it feels better when sitting or lying down. She denies any trauma or precipitating event. Pt has also had an unintentional weight loss of 10 pounds in the last 2 weeks. Denies fever/chills, malaise or lethargy.  Discussion of discharge plan of care, including discharge diagnoses, medication reconciliation, and follow-ups was conducted with Dr. Vuong on 10/3/2024, and discharge was approved.  # Lower back pain 2/2 severe spinal stenosis  - cont Robaxin Q8h  - Oxycodone IR PRN mod- severe pain  - Tylenol   - CT T-L-S spine - Multilevel degenerative changes within the lumbar spine resulting in severe spinal canal stenosis and compression of the cauda equina.  - MRI L Spine: Marrow edema noted of the left aspect of the sacrum suggestive of an insufficiency fracture. No discrete fracture line is identified.  - neurosurgery recs - no further acute neurosurgical intervention, recc ortho for L sacral insufficiency fx, pain management consult, F/U outpatient with Dr. Garner for poss candidate Intracept procedure outpatient- after above conservative management with outpatient PT and pain management  - ortho recs- [facility] (Vit D, Prealbumin, PTH, TSH), Inflammatory labs (Esr, Crp, HgbA1c), DEXA scan outpt?, f/u w/ ortho outpt w/ Dr. Yepez  follow   - pain management follow recommendations   #ESTEFANY likely 2/2 urinary retention   - 9/30 overnight straight cath x2 -> inserted brennan  - creatinine improved today   - passed tov  #elevated lfts  now downtrending  RUQ US with mild hepatomegaly, and r sided pleural effusion, c/f R sided HF.   No cardiac symptoms at this time, advise OP echocardiogram   # B/l LE pain now better  - b/l LE venous duplex - negative   # HTN  - monitor BP  - hold amlodipine  - cont metoprolol  # RA/OA  - cont methotrexate with foli     PRINCIPAL DISCHARGE DIAGNOSIS  Diagnosis: Back pain  Assessment and Plan of Treatment: You were admitted for low back pain. Imaging was obtained for concern of cauda equina syndrome, which was negative. Neurosurgery did not recommend acute neurosurgical intervention.  Orthopedic surgery was consulted for left sacral insufficiency fracture. Obtain a DEXA scan outpatient to evaluate bone density. Ultrasound was obtained for elevated liver enzymes, which showed mild hepatomegaly and possible right-sided hear failure. Obtain an outpatient echocardiogram to further evaluate. Followup with your PCP, orthopedic surgery (Dr. Yepez), and neurosurgery (Dr. Garner) in 2 weeks after discharge. If you have new or worsening symptoms, seek medical attention immediately or call 911.     PRINCIPAL DISCHARGE DIAGNOSIS  Diagnosis: Back pain  Assessment and Plan of Treatment: You were admitted for low back pain. Imaging was obtained for concern of cauda equina syndrome, which was negative. Neurosurgery did not recommend acute neurosurgical intervention.  Orthopedic surgery was consulted for left hip and left sacral insufficiency fracture. Obtain a DEXA scan outpatient to evaluate bone density. Ultrasound was obtained for elevated liver enzymes, which showed mild hepatomegaly and possible right-sided hear failure. Obtain an outpatient echocardiogram to further evaluate. Followup with your PCP, orthopedic surgery (Dr. Yepez), and neurosurgery (Dr. Garner) in 2 weeks after discharge. If you have new or worsening symptoms, seek medical attention immediately or call 911.

## 2024-10-02 NOTE — DISCHARGE NOTE PROVIDER - NSDCMRMEDTOKEN_GEN_ALL_CORE_FT
amLODIPine 10 mg oral tablet: 1 tab(s) orally once a day  Aspir 81 oral delayed release tablet: 1 tab(s) orally once a day  atorvastatin 80 mg oral tablet: 1 tab(s) orally once a day (at bedtime)  escitalopram 10 mg oral tablet: 1 tab(s) orally once a day  folic acid 1 mg oral tablet: 2 tab(s) orally once a day  lidocaine 4% topical film: Apply topically to affected area once a day  losartan-hydrochlorothiazide 100 mg-25 mg oral tablet: 1 tab(s) orally once a day  methocarbamol 500 mg oral tablet: 1 tab(s) orally once a day (at bedtime) as needed for  moderate pain  methotrexate 2.5 mg oral tablet: 8 tab(s) orally once a week every Tuesday, 4 tabs in AM and 4 tabs in PM  metoprolol tartrate 25 mg oral tablet: 1 tab(s) orally 2 times a day  Vitamin B12 1000 mcg oral tablet: 1 tab(s) orally once a day  Vitamin D3 125 mcg (5000 intl units) oral tablet: 1 tab(s) orally once a day   Aspir 81 oral delayed release tablet: 1 tab(s) orally once a day  atorvastatin 80 mg oral tablet: 1 tab(s) orally once a day (at bedtime)  escitalopram 10 mg oral tablet: 1 tab(s) orally once a day  folic acid 1 mg oral tablet: 2 tab(s) orally once a day  lidocaine 4% topical film: Apply topically to affected area once a day  losartan-hydrochlorothiazide 100 mg-25 mg oral tablet: 1 tab(s) orally once a day Restart when BP &gt; 140/90, otherwise continue to hold  methocarbamol 500 mg oral tablet: 1 tab(s) orally once a day (at bedtime) as needed for  moderate pain  methotrexate 2.5 mg oral tablet: 8 tab(s) orally once a week every Tuesday, 4 tabs in AM and 4 tabs in PM  metoprolol tartrate 25 mg oral tablet: 1 tab(s) orally 2 times a day  Vitamin B12 1000 mcg oral tablet: 1 tab(s) orally once a day  Vitamin D3 125 mcg (5000 intl units) oral tablet: 1 tab(s) orally once a day

## 2024-10-02 NOTE — DISCHARGE NOTE PROVIDER - PROVIDER TOKENS
PROVIDER:[TOKEN:[[id number]],FOLLOWUP:[2 weeks]],FREE:[LAST:[Lucho],FIRST:[Sanjiv],PHONE:[phone]:[(   )    -],FOLLOWUP:[2 weeks]] PROVIDER:[TOKEN:[[id number]],FOLLOWUP:[2 weeks]],FREE:[LAST:[Lucho],FIRST:[Sanjiv],PHONE:[phone]:[(   )    -],FOLLOWUP:[2 weeks]],PROVIDER:[TOKEN:[[id number]],FOLLOWUP:[2 weeks]]

## 2024-10-02 NOTE — DISCHARGE NOTE PROVIDER - ATTENDING DISCHARGE PHYSICAL EXAMINATION:
GENERAL: NAD, lying in bed comfortably  HEAD:  Atraumatic, normocephalic  HEART: Regular rate and rhythm, no murmurs, rubs, or gallops  LUNGS: Unlabored respirations.  Clear to auscultation bilaterally, no crackles, wheezing, or rhonchi  ABDOMEN: Soft, nontender, nondistended, +BS  EXTREMITIES: warm well perfused   NERVOUS SYSTEM:  A&Ox4, no focal deficits

## 2024-10-02 NOTE — PROGRESS NOTE ADULT - ASSESSMENT
83 yo female with lower back pain and inability to ambulate    # Lower back pain 2/2 severe spinal stenosis  - cont Robaxin Q8h  - Oxycodone IR PRN mod- severe pain  - Tylenol   - CT T-L-S spine - Multilevel degenerative changes within the lumbar spine resulting in severe spinal canal stenosis and compression of the cauda equina.  - MRI L Spine: Marrow edema noted of the left aspect of the sacrum suggestive of an insufficiency fracture. No discrete fracture line is identified.  - neurosurgery recs - no further acute neurosurgical intervention, recc ortho for L sacral insufficiency fx, pain management consult, F/U outpatient with Dr. Garner for poss candidate Intracept procedure outpatient- after above conservative management with outpatient PT and pain management  - ortho recs- [facility] (Vit D, Prealbumin, PTH, TSH), Inflammatory labs (Esr, Crp, HgbA1c), DEXA scan outpt?, f/u w/ ortho outpt w/ Dr. Yepez  follow   - pain management follow recommendations     #ESTEFANY likely 2/2 urinary retention   - 9/30 overnight straight cath x2 -> inserted brennan  - creatinine improved today   - will do TOV today     #elevated lfts  now downtrending  RUQ US with mild hepatomegaly, and r sided pleural effusion, c/f R sided HF.   No cardiac symptoms at this time, advise OP echocardiogram     # B/l LE pain now better  - b/l LE venous duplex - negative     # HTN  - monitor BP  - hold amlodipine  - cont metoprolol    # HLD  - cont atorvastatin  - low fat diet    # RA/OA  - cont methotrexate with folic acid weekly on tuesdays    # Depression  - cont Lexapro    # PUD  - Protonix daily    # CAD  - cont ASA      #DVT prophylaxis: Heparin    Pending: TOV, and then anticipate dc tomorrow     Disposition: STR     Total time spent to complete patient's bedside assessment, review medical chart, discuss medical plan of care with covering medical team was more than 35 minutes  with >50% of time spent face to face with patient, discussion with patient/family and/or coordination of care. My note supersedes them medical resident note in case of discrepancy.      Marybeth Vuong, DO

## 2024-10-02 NOTE — PROGRESS NOTE ADULT - SUBJECTIVE AND OBJECTIVE BOX
LENGTH OF HOSPITAL STAY: 1d    CHIEF COMPLAINT:    Patient is a 84y old  Female who presents with a chief complaint of Back pain (28 Sep 2024 14:42)    OVERNIGHT EVENTS:    - no overnight events  - pt examined at bedside, endorses that pain is more manageable   - tolerating PO, needs BM, making urine but had some dysuria overnight    FOLLOW UP:    - neurosurgery recs    HPI:    83 yo female with PMHx HTN, HLD, PUD s/p cauterization, RA/OA, CKD presents with c/o pain across lower back x 1 week. Pt states it started in the L hip but now is across the whole lower back. Pt states it is too painful to walk. Pt states it feels better when sitting or lying down. She denies any trauma or precipitating event. Pt has also had an unintentional weight loss of 10 pounds in the last 2 weeks. Denies fever/chills, malaise or lethargy.  (28 Sep 2024 14:42)      ALLERGIES:    penicillin (Rash; Anaphylaxis; Hives)      PMHx:    Hypertension  History of hip surgery  H/O knee surgery  Other irritable bowel syndrome  Mitral valve disorder  Arthritis  Basal cell carcinoma  CAD (coronary artery disease)  Chronic kidney disease (CKD)  PUD (peptic ulcer disease)  History of surgery  BILATERAL KNEE REPLACEMENT,    RIGHT HIP REPLACEMENT,  MARY IN RIGHT FEMUR,  CARDIAC STENTS X2,   CHOLECYSTECTOMY, right IOL      SOCIAL Hx:    non smoker non drinker  lives alone in apartment with daughter upstairs    MEDICATIONS:  STANDING MEDICATIONS  amLODIPine   Tablet 10 milliGRAM(s) Oral daily  aspirin enteric coated 81 milliGRAM(s) Oral daily  atorvastatin 80 milliGRAM(s) Oral at bedtime  cyanocobalamin 1000 MICROGram(s) Oral daily  escitalopram 10 milliGRAM(s) Oral daily  heparin   Injectable 5000 Unit(s) SubCutaneous every 12 hours  influenza  Vaccine (HIGH DOSE) 0.5 milliLiter(s) IntraMuscular once  metoprolol tartrate 25 milliGRAM(s) Oral two times a day  pantoprazole    Tablet 40 milliGRAM(s) Oral before breakfast    PRN MEDICATIONS  acetaminophen     Tablet .. 1000 milliGRAM(s) Oral every 8 hours PRN  aluminum hydroxide/magnesium hydroxide/simethicone Suspension 30 milliLiter(s) Oral every 4 hours PRN  melatonin 3 milliGRAM(s) Oral at bedtime PRN  methocarbamol 500 milliGRAM(s) Oral every 8 hours PRN  ondansetron Injectable 4 milliGRAM(s) IV Push every 8 hours PRN  oxyCODONE    IR 10 milliGRAM(s) Oral every 8 hours PRN      LABS:                        10.5   7.70  )-----------( 418      ( 29 Sep 2024 07:51 )             34.0     09-29    139  |  101  |  32[H]  ----------------------------<  113[H]  4.4   |  25  |  1.5    Ca    8.7      29 Sep 2024 07:51  Phos  4.6     09-29  Mg     2.0     09-29    TPro  6.7  /  Alb  3.5  /  TBili  0.4  /  DBili  x   /  AST  63[H]  /  ALT  62[H]  /  AlkPhos  210[H]  09-29      Urinalysis Basic - ( 29 Sep 2024 07:51 )    Color: x / Appearance: x / SG: x / pH: x  Gluc: 113 mg/dL / Ketone: x  / Bili: x / Urobili: x   Blood: x / Protein: x / Nitrite: x   Leuk Esterase: x / RBC: x / WBC x   Sq Epi: x / Non Sq Epi: x / Bacteria: x    RADIOLOGY:    < from: CT Lumbar Spine No Cont (09.28.24 @ 16:57) >    IMPRESSION:  Multilevel degenerative changes within the lumbar spine resulting in   severe spinal canal stenosis and compressionof the cauda equina.    --- End of Report ---      < end of copied text >      VITALS:   T(F): 98.4  HR: 63  BP: 93/54  RR: 18  SpO2: 92%        PHYSICAL EXAM:    Gen: NAD, resting in bed, appears younger than stated age  HEENT: Normocephalic, atraumatic  Neck: supple, no lymphadenopathy  CV: Regular rate & regular rhythm  Lungs: decreased BS at bases, no fremitus  Abdomen: Soft, BS present  Ext: Warm, well perfused  Neuro: moves all extremities against resistance, some weakness noted LLE, no appreciable deficit in sensation; no droop, awake;   Skin: no rash, no erythema  
SUBJECTIVE/OVERNIGHT EVENTS  Today is hospital day 3d. This morning patient was seen and examined at bedside, resting comfortably in bed. No acute or major events overnight. Pt denies any new complaints.    MEDICATIONS  STANDING MEDICATIONS  aspirin enteric coated 81 milliGRAM(s) Oral daily  atorvastatin 80 milliGRAM(s) Oral at bedtime  cyanocobalamin 1000 MICROGram(s) Oral daily  escitalopram 10 milliGRAM(s) Oral daily  heparin   Injectable 5000 Unit(s) SubCutaneous every 12 hours  influenza  Vaccine (HIGH DOSE) 0.5 milliLiter(s) IntraMuscular once  methotrexate 2.5 milliGRAM(s) Oral every week  metoprolol tartrate 25 milliGRAM(s) Oral two times a day  pantoprazole    Tablet 40 milliGRAM(s) Oral before breakfast  polyethylene glycol 3350 17 Gram(s) Oral two times a day  senna 2 Tablet(s) Oral at bedtime  sodium chloride 0.9%. 1000 milliLiter(s) IV Continuous <Continuous>  tamsulosin 0.4 milliGRAM(s) Oral at bedtime    PRN MEDICATIONS  acetaminophen     Tablet .. 1000 milliGRAM(s) Oral every 8 hours PRN  aluminum hydroxide/magnesium hydroxide/simethicone Suspension 30 milliLiter(s) Oral every 4 hours PRN  melatonin 3 milliGRAM(s) Oral at bedtime PRN  methocarbamol 500 milliGRAM(s) Oral every 8 hours PRN  ondansetron Injectable 4 milliGRAM(s) IV Push every 8 hours PRN  oxyCODONE    IR 10 milliGRAM(s) Oral every 8 hours PRN    VITALS  T(F): 97.8 (10-01-24 @ 05:22), Max: 98.3 (09-30-24 @ 20:10)  HR: 86 (10-01-24 @ 08:01) (77 - 86)  BP: 100/58 (10-01-24 @ 08:01) (92/54 - 113/57)  RR: 16 (10-01-24 @ 08:01) (16 - 18)  SpO2: --    PHYSICAL EXAM  GENERAL: NAD, lying in bed comfortably  HEAD:  Atraumatic, normocephalic  HEART: Regular rate and rhythm, no murmurs, rubs, or gallops  LUNGS: Unlabored respirations.  Clear to auscultation bilaterally, no crackles, wheezing, or rhonchi  ABDOMEN: Soft, nontender, nondistended, +BS  EXTREMITIES: Pain on palpation in b/l LE, 1-2+ b/l LE edema  NERVOUS SYSTEM:  A&Ox4, no focal deficits     LABS             8.9    5.93  )-----------( 309      ( 10-01-24 @ 07:11 )             28.9     132  |  98  |  45  -------------------------<  108   10-01-24 @ 07:11  3.9  |  23  |  2.1    Ca      8.0     10-01-24 @ 07:11  Phos   3.8     10-01-24 @ 07:11  Mg     2.2     10-01-24 @ 07:11    TPro  5.6  /  Alb  3.2  /  TBili  0.2  /  DBili  x   /  AST  27  /  ALT  43  /  AlkPhos  152  /  GGT  x     10-01-24 @ 07:11        Urinalysis Basic - ( 01 Oct 2024 07:11 )    Color: x / Appearance: x / SG: x / pH: x  Gluc: 108 mg/dL / Ketone: x  / Bili: x / Urobili: x   Blood: x / Protein: x / Nitrite: x   Leuk Esterase: x / RBC: x / WBC x   Sq Epi: x / Non Sq Epi: x / Bacteria: x    
SUBJECTIVE/OVERNIGHT EVENTS  Today is hospital day 2d. This morning patient was seen and examined at bedside, resting comfortably in bed. No acute or major events overnight. Pt denies any new complaints.    MEDICATIONS  STANDING MEDICATIONS  aspirin enteric coated 81 milliGRAM(s) Oral daily  atorvastatin 80 milliGRAM(s) Oral at bedtime  cyanocobalamin 1000 MICROGram(s) Oral daily  escitalopram 10 milliGRAM(s) Oral daily  heparin   Injectable 5000 Unit(s) SubCutaneous every 12 hours  influenza  Vaccine (HIGH DOSE) 0.5 milliLiter(s) IntraMuscular once  metoprolol tartrate 25 milliGRAM(s) Oral two times a day  pantoprazole    Tablet 40 milliGRAM(s) Oral before breakfast  tamsulosin 0.4 milliGRAM(s) Oral at bedtime    PRN MEDICATIONS  acetaminophen     Tablet .. 1000 milliGRAM(s) Oral every 8 hours PRN  aluminum hydroxide/magnesium hydroxide/simethicone Suspension 30 milliLiter(s) Oral every 4 hours PRN  melatonin 3 milliGRAM(s) Oral at bedtime PRN  methocarbamol 500 milliGRAM(s) Oral every 8 hours PRN  ondansetron Injectable 4 milliGRAM(s) IV Push every 8 hours PRN  oxyCODONE    IR 10 milliGRAM(s) Oral every 8 hours PRN    VITALS  T(F): 98 (24 @ 14:03), Max: 99.4 (24 @ 05:41)  HR: 83 (24 @ 14:03) (83 - 89)  BP: 113/57 (24 @ 14:03) (102/51 - 120/62)  RR: 18 (24 @ 14:03) (18 - 18)  SpO2: 95% (24 @ 05:41) (95% - 95%)    PHYSICAL EXAM  GENERAL: NAD, lying in bed comfortably  HEAD:  Atraumatic, normocephalic  HEART: Regular rate and rhythm, no murmurs, rubs, or gallops  LUNGS: Unlabored respirations.  Clear to auscultation bilaterally, no crackles, wheezing, or rhonchi  ABDOMEN: Soft, nontender, nondistended, +BS  EXTREMITIES: Pain on palpation in b/l LE, trace b/l LE edema  NERVOUS SYSTEM:  A&Ox4, no focal deficits     LABS             9.2    6.65  )-----------( 364      ( 24 @ 13:07 )             29.6     132  |  98  |  37  -------------------------<  105   24 @ 13:07  4.0  |  23  |  2.0    Ca      7.9     24 @ 13:07  Phos   3.2     24 @ 13:07  Mg     1.7     24 @ 13:07    TPro  5.8  /  Alb  3.4  /  TBili  0.3  /  DBili  x   /  AST  37  /  ALT  55  /  AlkPhos  170  /  GGT  x     24 @ 13:07        Urinalysis Basic - ( 30 Sep 2024 14:20 )    Color: Yellow / Appearance: Clear / S.015 / pH: x  Gluc: x / Ketone: Negative mg/dL  / Bili: Negative / Urobili: 0.2 mg/dL   Blood: x / Protein: Negative mg/dL / Nitrite: Negative   Leuk Esterase: Negative / RBC: x / WBC x   Sq Epi: x / Non Sq Epi: x / Bacteria: x    
    AGUSTÍN TURCIOS  84y, Female  Allergy: penicillin (Rash; Anaphylaxis; Hives)    Hospital Day: 4d    Patient seen and examined earlier today. Feeling well, working with PT, planned for TOV today.     PMH/PSH:  PAST MEDICAL & SURGICAL HISTORY:  Hypertension      History of hip surgery      H/O knee surgery      Other irritable bowel syndrome      Mitral valve disorder      Arthritis      Basal cell carcinoma      CAD (coronary artery disease)      Chronic kidney disease (CKD)      PUD (peptic ulcer disease)      History of surgery  BILATERAL KNEE REPLACEMENT,    RIGHT HIP REPLACEMENT,  MARY IN RIGHT FEMUR,  CARDIAC STENTS X2,   CHOLECYSTECTOMY, right IOL          LAST 24-Hr EVENTS:    VITALS:  T(F): 97.9 (10-02-24 @ 13:33), Max: 98.1 (10-02-24 @ 06:05)  HR: 67 (10-02-24 @ 14:20)  BP: 127/65 (10-02-24 @ 14:20) (81/46 - 127/65)  RR: 18 (10-02-24 @ 13:33)  SpO2: 92% (10-01-24 @ 20:13)        TESTS & MEASUREMENTS:  Weight (Kg):   BMI (kg/m2): 25.8 (09-28)    09-30-24 @ 07:01  -  10-01-24 @ 07:00  --------------------------------------------------------  IN: 300 mL / OUT: 1750 mL / NET: -1450 mL    10-01-24 @ 07:01  -  10-02-24 @ 07:00  --------------------------------------------------------  IN: 0 mL / OUT: 1000 mL / NET: -1000 mL                            8.8    10.45 )-----------( 320      ( 02 Oct 2024 07:19 )             28.7       10-02    136  |  103  |  46[H]  ----------------------------<  96  4.7   |  23  |  1.5    Ca    8.3[L]      02 Oct 2024 07:19  Phos  3.8     10-01  Mg     2.1     10-02    TPro  5.3[L]  /  Alb  3.0[L]  /  TBili  0.3  /  DBili  x   /  AST  25  /  ALT  33  /  AlkPhos  128[H]  10-02    LIVER FUNCTIONS - ( 02 Oct 2024 07:19 )  Alb: 3.0 g/dL / Pro: 5.3 g/dL / ALK PHOS: 128 U/L / ALT: 33 U/L / AST: 25 U/L / GGT: x                 Urinalysis Basic - ( 02 Oct 2024 07:19 )    Color: x / Appearance: x / SG: x / pH: x  Gluc: 96 mg/dL / Ketone: x  / Bili: x / Urobili: x   Blood: x / Protein: x / Nitrite: x   Leuk Esterase: x / RBC: x / WBC x   Sq Epi: x / Non Sq Epi: x / Bacteria: x                  RADIOLOGY, ECG, & ADDITIONAL TESTS:      RECENT DIAGNOSTIC ORDERS:  Magnesium: AM Sched. Collection: 03-Oct-2024 04:30 (10-02-24 @ 14:07)  Comprehensive Metabolic Panel: AM Sched. Collection: 03-Oct-2024 04:30 (10-02-24 @ 14:07)  Complete Blood Count + Automated Diff: AM Sched. Collection: 03-Oct-2024 04:30 (10-02-24 @ 14:07)      MEDICATIONS:  MEDICATIONS  (STANDING):  acetaminophen     Tablet .. 650 milliGRAM(s) Oral every 6 hours  aspirin enteric coated 81 milliGRAM(s) Oral daily  atorvastatin 80 milliGRAM(s) Oral at bedtime  chlorhexidine 2% Cloths 1 Application(s) Topical <User Schedule>  cyanocobalamin 1000 MICROGram(s) Oral daily  escitalopram 10 milliGRAM(s) Oral daily  folic acid 2 milliGRAM(s) Oral daily  heparin   Injectable 5000 Unit(s) SubCutaneous every 12 hours  influenza  Vaccine (HIGH DOSE) 0.5 milliLiter(s) IntraMuscular once  lidocaine   4% Patch 1 Patch Transdermal daily  methocarbamol 500 milliGRAM(s) Oral every 8 hours  metoprolol tartrate 25 milliGRAM(s) Oral two times a day  pantoprazole    Tablet 40 milliGRAM(s) Oral before breakfast  polyethylene glycol 3350 17 Gram(s) Oral two times a day  senna 2 Tablet(s) Oral at bedtime  sodium chloride 0.9%. 1000 milliLiter(s) (75 mL/Hr) IV Continuous <Continuous>  tamsulosin 0.4 milliGRAM(s) Oral at bedtime    MEDICATIONS  (PRN):  aluminum hydroxide/magnesium hydroxide/simethicone Suspension 30 milliLiter(s) Oral every 4 hours PRN Dyspepsia  melatonin 3 milliGRAM(s) Oral at bedtime PRN Insomnia  ondansetron Injectable 4 milliGRAM(s) IV Push every 8 hours PRN Nausea and/or Vomiting  oxyCODONE    IR 10 milliGRAM(s) Oral every 8 hours PRN Severe Pain (7 - 10)      HOME MEDICATIONS:  amLODIPine 10 mg oral tablet (03-30)  Aspir 81 oral delayed release tablet (03-30)  atorvastatin 80 mg oral tablet (03-30)  escitalopram 10 mg oral tablet (03-30)  folic acid 1 mg oral tablet (10-01)  losartan-hydrochlorothiazide 100 mg-25 mg oral tablet (03-30)  methotrexate 2.5 mg oral tablet (10-01)  metoprolol tartrate 25 mg oral tablet (03-30)  Vitamin B12 1000 mcg oral tablet (03-30)  Vitamin D3 125 mcg (5000 intl units) oral tablet (03-30)      PHYSICAL EXAM  GENERAL: NAD, lying in bed comfortably  HEAD:  Atraumatic, normocephalic  HEART: Regular rate and rhythm, no murmurs, rubs, or gallops  LUNGS: Unlabored respirations.  Clear to auscultation bilaterally, no crackles, wheezing, or rhonchi  ABDOMEN: Soft, nontender, nondistended, +BS  EXTREMITIES: warm well perfused   NERVOUS SYSTEM:  A&Ox4, no focal deficits     
LENGTH OF HOSPITAL STAY: 1d    CHIEF COMPLAINT:    Patient is a 84y old  Female who presents with a chief complaint of Back pain (28 Sep 2024 14:42)    OVERNIGHT EVENTS:    - no overnight events  - pt examined at bedside, endorses that pain is more manageable   - tolerating PO, NS following     FOLLOW UP:    - neurosurgery recs    HPI:    85 yo female with PMHx HTN, HLD, PUD s/p cauterization, RA/OA, CKD presents with c/o pain across lower back x 1 week. Pt states it started in the L hip but now is across the whole lower back. Pt states it is too painful to walk. Pt states it feels better when sitting or lying down. She denies any trauma or precipitating event. Pt has also had an unintentional weight loss of 10 pounds in the last 2 weeks. Denies fever/chills, malaise or lethargy.  (28 Sep 2024 14:42)      ALLERGIES:    penicillin (Rash; Anaphylaxis; Hives)      PMHx:    Hypertension  History of hip surgery  H/O knee surgery  Other irritable bowel syndrome  Mitral valve disorder  Arthritis  Basal cell carcinoma  CAD (coronary artery disease)  Chronic kidney disease (CKD)  PUD (peptic ulcer disease)  History of surgery  BILATERAL KNEE REPLACEMENT,    RIGHT HIP REPLACEMENT,  MARY IN RIGHT FEMUR,  CARDIAC STENTS X2,   CHOLECYSTECTOMY, right IOL      SOCIAL Hx:    non smoker non drinker  lives alone in apartment with daughter upstairs    MEDICATIONS:  STANDING MEDICATIONS  amLODIPine   Tablet 10 milliGRAM(s) Oral daily  aspirin enteric coated 81 milliGRAM(s) Oral daily  atorvastatin 80 milliGRAM(s) Oral at bedtime  cyanocobalamin 1000 MICROGram(s) Oral daily  escitalopram 10 milliGRAM(s) Oral daily  heparin   Injectable 5000 Unit(s) SubCutaneous every 12 hours  influenza  Vaccine (HIGH DOSE) 0.5 milliLiter(s) IntraMuscular once  metoprolol tartrate 25 milliGRAM(s) Oral two times a day  pantoprazole    Tablet 40 milliGRAM(s) Oral before breakfast    PRN MEDICATIONS  acetaminophen     Tablet .. 1000 milliGRAM(s) Oral every 8 hours PRN  aluminum hydroxide/magnesium hydroxide/simethicone Suspension 30 milliLiter(s) Oral every 4 hours PRN  melatonin 3 milliGRAM(s) Oral at bedtime PRN  methocarbamol 500 milliGRAM(s) Oral every 8 hours PRN  ondansetron Injectable 4 milliGRAM(s) IV Push every 8 hours PRN  oxyCODONE    IR 10 milliGRAM(s) Oral every 8 hours PRN      LABS:               LABS:                        10.5   7.70  )-----------( 418      ( 29 Sep 2024 07:51 )             34.0     09-29    139  |  101  |  32[H]  ----------------------------<  113[H]  4.4   |  25  |  1.5    Ca    8.7      29 Sep 2024 07:51  Phos  4.6     09-29  Mg     2.0     09-29    TPro  6.7  /  Alb  3.5  /  TBili  0.4  /  DBili  x   /  AST  63[H]  /  ALT  62[H]  /  AlkPhos  210[H]  09-29            Urinalysis Basic - ( 29 Sep 2024 07:51 )    Color: x / Appearance: x / SG: x / pH: x  Gluc: 113 mg/dL / Ketone: x  / Bili: x / Urobili: x   Blood: x / Protein: x / Nitrite: x   Leuk Esterase: x / RBC: x / WBC x   Sq Epi: x / Non Sq Epi: x / Bacteria: x    RADIOLOGY:    < from: CT Lumbar Spine No Cont (09.28.24 @ 16:57) >    IMPRESSION:  Multilevel degenerative changes within the lumbar spine resulting in   severe spinal canal stenosis and compressionof the cauda equina.    --- End of Report ---      < end of copied text >      VITALS:   Vital Signs Last 24 Hrs  T(C): 37.4 (30 Sep 2024 05:41), Max: 37.4 (30 Sep 2024 05:41)  T(F): 99.4 (30 Sep 2024 05:41), Max: 99.4 (30 Sep 2024 05:41)  HR: 88 (30 Sep 2024 05:41) (83 - 89)  BP: 102/51 (30 Sep 2024 05:41) (102/51 - 120/62)  BP(mean): 82 (29 Sep 2024 20:50) (82 - 82)  RR: 18 (30 Sep 2024 05:41) (18 - 18)  SpO2: 95% (30 Sep 2024 05:41) (76% - 95%)    Parameters below as of 29 Sep 2024 20:50  Patient On (Oxygen Delivery Method): room air            PHYSICAL EXAM:    Gen: NAD, resting in bed, appears younger than stated age  HEENT: Normocephalic, atraumatic  Neck: supple, no lymphadenopathy  CV: Regular rate & regular rhythm  Lungs: decreased BS at bases, no fremitus  Abdomen: Soft, BS present  Ext: Warm, well perfused  Neuro: moves all extremities against resistance, some weakness noted LLE, no appreciable deficit in sensation; no droop, awake;   Skin: no rash, no erythema

## 2024-10-02 NOTE — DISCHARGE NOTE PROVIDER - CARE PROVIDER_API CALL
Danica Ortega  Internal Medicine  [address] [place] [address]  Phone: [phone]  Fax: [phone]  Follow Up Time: 2 weeks    Sanjiv Yepez  Phone: [phone]  Fax: (   )    -  Follow Up Time: 2 weeks   Danica Ortega  Internal Medicine  [address] [place] [address]  Phone: [phone]  Fax: [phone]  Follow Up Time: 2 weeks    Sanjiv Yepez  Phone: [phone]  Fax: (   )    -  Follow Up Time: 2 weeks    Clarisse Garner  Neurosurgery  [address] [place] [address], [facility] [address]  [place] [address]  Phone: [phone]  Fax: [phone]  Follow Up Time: 2 weeks

## 2024-10-02 NOTE — DISCHARGE NOTE PROVIDER - CARE PROVIDERS DIRECT ADDRESSES
,DirectAddress_Unknown,DirectAddress_Unknown ,DirectAddress_Unknown,DirectAddress_Unknown,[email or web address]

## 2024-10-02 NOTE — DISCHARGE NOTE PROVIDER - HOSPITAL COURSE
85 yo female with PMHx HTN, HLD, PUD s/p cauterization, RA/OA, CKD presents with c/o pain across lower back x 1 week. Pt states it started in the L hip but now is across the whole lower back. Pt states it is too painful to walk. Pt states it feels better when sitting or lying down. She denies any trauma or precipitating event. Pt has also had an unintentional weight loss of 10 pounds in the last 2 weeks. Denies fever/chills, malaise or lethargy.     VITALS:   T(C): 36.6 (10-02-24 @ 13:33), Max: 36.7 (10-01-24 @ 14:09)  HR: 67 (10-02-24 @ 13:33) (57 - 89)  BP: 81/46 (10-02-24 @ 13:33) (81/46 - 120/63)  RR: 18 (10-02-24 @ 13:33) (18 - 20)  SpO2: 92% (10-01-24 @ 20:13) (92% - 92%)    GENERAL: NAD, lying in bed comfortably  HEAD:  Atraumatic, normocephalic  HEART: Regular rate and rhythm, no murmurs, rubs, or gallops  LUNGS: Unlabored respirations.  Clear to auscultation bilaterally, no crackles, wheezing, or rhonchi  ABDOMEN: Soft, nontender, nondistended, +BS  EXTREMITIES: 2+ b/l LE edema  NERVOUS SYSTEM:  A&Ox4, no focal deficits       Discussion of discharge plan of care, including discharge diagnoses, medication reconciliation, and follow-ups was conducted with  *****on ***** at *****, and discharge was approved.      # Lower back pain r/o cauda equina syndrome?  - cont Robaxin Q8h  - Oxycodone IR PRN mod- severe pain  - Tylenol 1gm Q8h  - CT T-L-S spine - cauda equina compression  - neurosurgery recs - no further acute neurosurgical intervention, recc ortho for L hip and L sacral insufficiency fx, pain management consult, F/U outpatient with Dr. Garner for poss candidate Intracept procedure outpatient- after above conservative management with outpatient PT and pain management  - bladder scan q6 and straight cath if retaining >350cc  - 9/30 overnight straight cath x2 -> inserted brennan  - started flomax  - ortho recs pending - [facility] (Vit D, Prealbumin, PTH, TSH), Inflammatory labs (Esr, Crp, HgbA1c), DEXA scan outpt?, f/u w/ ortho outpt w/ Dr. Yepez  - pain management recs appreciated - Methocarbamol 500mg q8h standing, Acetaminophen 650mg q6h standing, Lidocaine patch to painful area, Oxycodone 10mg q8h PRN for severe breakthrough pain, Ibuprofen 400mg q6h PRN (hold off due to elevated Cr), PT/OT  - [facility]: Vit D 30, Prealbumin 14, PTH 48, TSH 2.44  - Inflammatory labs: Esr 72, Crp 19.6, HgbA1c 5.9  - monitor LFTs  - 10/1 RUQ US - Overall limited study. Mild hepatomegaly Possible right-sided heart failure. Right pleural effusion. Status post cholecystectomy. Mildly dilated common bile duct. Could be correlated with MRCP. Echogenic kidneys with bilateral renal cysts which may reflect medical renal disease.    # B/l LE pain  - 10/1 b/l LE venous duplex - No evidence of deep venous thrombosis in either lower extremity.    #ESTEFANY  - Cr 1.2 (9/28) -> 2.1 (10/1)  - 10/1 RBUS - Poorly visualized right kidney due to numerous cysts. Left renal cyst are noted. Visualized renal parenchyma appears echogenic suggesting medical renal disease. No hydronephrosis within the left kidney.  - started on gentle hydration 85 yo female with PMHx HTN, HLD, PUD s/p cauterization, RA/OA, CKD presents with c/o pain across lower back x 1 week. Pt states it started in the L hip but now is across the whole lower back. Pt states it is too painful to walk. Pt states it feels better when sitting or lying down. She denies any trauma or precipitating event. Pt has also had an unintentional weight loss of 10 pounds in the last 2 weeks. Denies fever/chills, malaise or lethargy.     VITALS:   T(C): 36.6 (10-02-24 @ 13:33), Max: 36.7 (10-01-24 @ 14:09)  HR: 67 (10-02-24 @ 13:33) (57 - 89)  BP: 81/46 (10-02-24 @ 13:33) (81/46 - 120/63)  RR: 18 (10-02-24 @ 13:33) (18 - 20)  SpO2: 92% (10-01-24 @ 20:13) (92% - 92%)    GENERAL: NAD, lying in bed comfortably  HEAD:  Atraumatic, normocephalic  HEART: Regular rate and rhythm, no murmurs, rubs, or gallops  LUNGS: Unlabored respirations.  Clear to auscultation bilaterally, no crackles, wheezing, or rhonchi  ABDOMEN: Soft, nontender, nondistended, +BS  EXTREMITIES: 2+ b/l LE edema  NERVOUS SYSTEM:  A&Ox4, no focal deficits       Discussion of discharge plan of care, including discharge diagnoses, medication reconciliation, and follow-ups was conducted with  *****on ***** at *****, and discharge was approved.      # Lower back pain r/o cauda equina syndrome?  - cont Robaxin Q8h  - Oxycodone IR PRN mod- severe pain  - Tylenol 1gm Q8h  - CT T-L-S spine - cauda equina compression  - neurosurgery recs - no further acute neurosurgical intervention, recc ortho for L hip and L sacral insufficiency fx, pain management consult, F/U outpatient with Dr. Garner for poss candidate Intracept procedure outpatient- after above conservative management with outpatient PT and pain management  - bladder scan q6 and straight cath if retaining >350cc  - 9/30 overnight straight cath x2 -> inserted brennan  - started flomax  - ortho recs pending - [facility] (Vit D, Prealbumin, PTH, TSH), Inflammatory labs (Esr, Crp, HgbA1c), DEXA scan outpt?, f/u w/ ortho outpt w/ Dr. Yepez  - pain management recs appreciated - Methocarbamol 500mg q8h standing, Acetaminophen 650mg q6h standing, Lidocaine patch to painful area, Oxycodone 10mg q8h PRN for severe breakthrough pain, Ibuprofen 400mg q6h PRN (hold off due to elevated Cr), PT/OT  - [facility]: Vit D 30, Prealbumin 14, PTH 48, TSH 2.44  - Inflammatory labs: Esr 72, Crp 19.6, HgbA1c 5.9  - monitor LFTs  - 10/1 RUQ US - Overall limited study. Mild hepatomegaly Possible right-sided heart failure. Right pleural effusion. Status post cholecystectomy. Mildly dilated common bile duct. Could be correlated with MRCP. Echogenic kidneys with bilateral renal cysts which may reflect medical renal disease.  - f/u echo outpt    # B/l LE pain  - 10/1 b/l LE venous duplex - No evidence of deep venous thrombosis in either lower extremity.    #ESTEFANY  - Cr 1.2 (9/28) -> 2.1 (10/1)  - 10/1 RBUS - Poorly visualized right kidney due to numerous cysts. Left renal cyst are noted. Visualized renal parenchyma appears echogenic suggesting medical renal disease. No hydronephrosis within the left kidney.  - started on gentle hydration 85 yo female with PMHx HTN, HLD, PUD s/p cauterization, RA/OA, CKD presents with c/o pain across lower back x 1 week. Pt states it started in the L hip but now is across the whole lower back. Pt states it is too painful to walk. Pt states it feels better when sitting or lying down. She denies any trauma or precipitating event. Pt has also had an unintentional weight loss of 10 pounds in the last 2 weeks. Denies fever/chills, malaise or lethargy.     VITALS:   T(C): 36.6 (10-02-24 @ 13:33), Max: 36.7 (10-01-24 @ 14:09)  HR: 67 (10-02-24 @ 13:33) (57 - 89)  BP: 81/46 (10-02-24 @ 13:33) (81/46 - 120/63)  RR: 18 (10-02-24 @ 13:33) (18 - 20)  SpO2: 92% (10-01-24 @ 20:13) (92% - 92%)    GENERAL: NAD, lying in bed comfortably  HEAD:  Atraumatic, normocephalic  HEART: Regular rate and rhythm, no murmurs, rubs, or gallops  LUNGS: Unlabored respirations.  Clear to auscultation bilaterally, no crackles, wheezing, or rhonchi  ABDOMEN: Soft, nontender, nondistended, +BS  EXTREMITIES: 2+ b/l LE edema  NERVOUS SYSTEM:  A&Ox4, no focal deficits       Discussion of discharge plan of care, including discharge diagnoses, medication reconciliation, and follow-ups was conducted with  *****on ***** at *****, and discharge was approved.      # Lower back pain 2/2 severe spinal stenosis  - cont Robaxin Q8h  - Oxycodone IR PRN mod- severe pain  - Tylenol   - CT T-L-S spine - Multilevel degenerative changes within the lumbar spine resulting in severe spinal canal stenosis and compression of the cauda equina.  - MRI L Spine: Marrow edema noted of the left aspect of the sacrum suggestive of an insufficiency fracture. No discrete fracture line is identified.  - neurosurgery recs - no further acute neurosurgical intervention, recc ortho for L sacral insufficiency fx, pain management consult, F/U outpatient with Dr. Garner for poss candidate Intracept procedure outpatient- after above conservative management with outpatient PT and pain management  - ortho recs- [facility] (Vit D, Prealbumin, PTH, TSH), Inflammatory labs (Esr, Crp, HgbA1c), DEXA scan outpt?, f/u w/ ortho outpt w/ Dr. Yepez  follow   - pain management follow recommendations     #ESTEFANY likely 2/2 urinary retention   - 9/30 overnight straight cath x2 -> inserted brennan  - creatinine improved today   - passed tov    #elevated lfts  now downtrending  RUQ US with mild hepatomegaly, and r sided pleural effusion, c/f R sided HF.   No cardiac symptoms at this time, advise OP echocardiogram     # B/l LE pain now better  - b/l LE venous duplex - negative     # HTN  - monitor BP  - hold amlodipine  - cont metoprolol    # HLD  - cont atorvastatin  - low fat diet    # RA/OA  - cont methotrexate with folic acid weekly on tuesdays    # Depression  - cont Lexapro    # PUD  - Protonix daily    # CAD  - cont ASA   83 yo female with PMHx HTN, HLD, PUD s/p cauterization, RA/OA, CKD presents with c/o pain across lower back x 1 week. Pt states it started in the L hip but now is across the whole lower back. Pt states it is too painful to walk. Pt states it feels better when sitting or lying down. She denies any trauma or precipitating event. Pt has also had an unintentional weight loss of 10 pounds in the last 2 weeks. Denies fever/chills, malaise or lethargy.      Discussion of discharge plan of care, including discharge diagnoses, medication reconciliation, and follow-ups was conducted with Dr. Vuong on 10/3/2024, and discharge was approved.      # Lower back pain 2/2 severe spinal stenosis  - cont Robaxin Q8h  - Oxycodone IR PRN mod- severe pain  - Tylenol   - CT T-L-S spine - Multilevel degenerative changes within the lumbar spine resulting in severe spinal canal stenosis and compression of the cauda equina.  - MRI L Spine: Marrow edema noted of the left aspect of the sacrum suggestive of an insufficiency fracture. No discrete fracture line is identified.  - neurosurgery recs - no further acute neurosurgical intervention, recc ortho for L sacral insufficiency fx, pain management consult, F/U outpatient with Dr. Garner for poss candidate Intracept procedure outpatient- after above conservative management with outpatient PT and pain management  - ortho recs- [facility] (Vit D, Prealbumin, PTH, TSH), Inflammatory labs (Esr, Crp, HgbA1c), DEXA scan outpt?, f/u w/ ortho outpt w/ Dr. Yepez  follow   - pain management follow recommendations     #ESTEFANY likely 2/2 urinary retention   - 9/30 overnight straight cath x2 -> inserted brennan  - creatinine improved today   - passed tov    #elevated lfts  now downtrending  RUQ US with mild hepatomegaly, and r sided pleural effusion, c/f R sided HF.   No cardiac symptoms at this time, advise OP echocardiogram     # B/l LE pain now better  - b/l LE venous duplex - negative     # HTN  - monitor BP  - hold amlodipine  - cont metoprolol    # HLD  - cont atorvastatin  - low fat diet    # RA/OA  - cont methotrexate with folic acid weekly on tuesdays    # Depression  - cont Lexapro    # PUD  - Protonix daily    # CAD  - cont ASA

## 2024-10-03 ENCOUNTER — TRANSCRIPTION ENCOUNTER (OUTPATIENT)
Age: 84
End: 2024-10-03

## 2024-10-03 VITALS
RESPIRATION RATE: 19 BRPM | DIASTOLIC BLOOD PRESSURE: 65 MMHG | TEMPERATURE: 98 F | HEART RATE: 69 BPM | SYSTOLIC BLOOD PRESSURE: 132 MMHG

## 2024-10-03 LAB
ALBUMIN SERPL ELPH-MCNC: 3.4 G/DL — LOW (ref 3.5–5.2)
ALP SERPL-CCNC: 141 U/L — HIGH (ref 30–115)
ALT FLD-CCNC: 34 U/L — SIGNIFICANT CHANGE UP (ref 0–41)
ANION GAP SERPL CALC-SCNC: 12 MMOL/L — SIGNIFICANT CHANGE UP (ref 7–14)
AST SERPL-CCNC: 26 U/L — SIGNIFICANT CHANGE UP (ref 0–41)
BASOPHILS # BLD AUTO: 0.04 K/UL — SIGNIFICANT CHANGE UP (ref 0–0.2)
BASOPHILS NFR BLD AUTO: 0.4 % — SIGNIFICANT CHANGE UP (ref 0–1)
BILIRUB SERPL-MCNC: 0.3 MG/DL — SIGNIFICANT CHANGE UP (ref 0.2–1.2)
BUN SERPL-MCNC: 44 MG/DL — HIGH (ref 10–20)
CALCIUM SERPL-MCNC: 8.5 MG/DL — SIGNIFICANT CHANGE UP (ref 8.4–10.5)
CHLORIDE SERPL-SCNC: 106 MMOL/L — SIGNIFICANT CHANGE UP (ref 98–110)
CO2 SERPL-SCNC: 22 MMOL/L — SIGNIFICANT CHANGE UP (ref 17–32)
CREAT SERPL-MCNC: 1.3 MG/DL — SIGNIFICANT CHANGE UP (ref 0.7–1.5)
EGFR: 41 ML/MIN/1.73M2 — LOW
EOSINOPHIL # BLD AUTO: 0.19 K/UL — SIGNIFICANT CHANGE UP (ref 0–0.7)
EOSINOPHIL NFR BLD AUTO: 2 % — SIGNIFICANT CHANGE UP (ref 0–8)
GLUCOSE SERPL-MCNC: 94 MG/DL — SIGNIFICANT CHANGE UP (ref 70–99)
HCT VFR BLD CALC: 31.6 % — LOW (ref 37–47)
HGB BLD-MCNC: 9.7 G/DL — LOW (ref 12–16)
IMM GRANULOCYTES NFR BLD AUTO: 0.5 % — HIGH (ref 0.1–0.3)
LYMPHOCYTES # BLD AUTO: 0.75 K/UL — LOW (ref 1.2–3.4)
LYMPHOCYTES # BLD AUTO: 8.1 % — LOW (ref 20.5–51.1)
MAGNESIUM SERPL-MCNC: 2.1 MG/DL — SIGNIFICANT CHANGE UP (ref 1.8–2.4)
MCHC RBC-ENTMCNC: 30.7 G/DL — LOW (ref 32–37)
MCHC RBC-ENTMCNC: 31.6 PG — HIGH (ref 27–31)
MCV RBC AUTO: 102.9 FL — HIGH (ref 81–99)
MONOCYTES # BLD AUTO: 0.6 K/UL — SIGNIFICANT CHANGE UP (ref 0.1–0.6)
MONOCYTES NFR BLD AUTO: 6.5 % — SIGNIFICANT CHANGE UP (ref 1.7–9.3)
NEUTROPHILS # BLD AUTO: 7.67 K/UL — HIGH (ref 1.4–6.5)
NEUTROPHILS NFR BLD AUTO: 82.5 % — HIGH (ref 42.2–75.2)
NRBC # BLD: 0 /100 WBCS — SIGNIFICANT CHANGE UP (ref 0–0)
PLATELET # BLD AUTO: 394 K/UL — SIGNIFICANT CHANGE UP (ref 130–400)
PMV BLD: 10.1 FL — SIGNIFICANT CHANGE UP (ref 7.4–10.4)
POTASSIUM SERPL-MCNC: 4.9 MMOL/L — SIGNIFICANT CHANGE UP (ref 3.5–5)
POTASSIUM SERPL-SCNC: 4.9 MMOL/L — SIGNIFICANT CHANGE UP (ref 3.5–5)
PROT SERPL-MCNC: 5.9 G/DL — LOW (ref 6–8)
RBC # BLD: 3.07 M/UL — LOW (ref 4.2–5.4)
RBC # FLD: 15.8 % — HIGH (ref 11.5–14.5)
SODIUM SERPL-SCNC: 140 MMOL/L — SIGNIFICANT CHANGE UP (ref 135–146)
WBC # BLD: 9.3 K/UL — SIGNIFICANT CHANGE UP (ref 4.8–10.8)
WBC # FLD AUTO: 9.3 K/UL — SIGNIFICANT CHANGE UP (ref 4.8–10.8)

## 2024-10-03 PROCEDURE — 99239 HOSP IP/OBS DSCHRG MGMT >30: CPT

## 2024-10-03 RX ORDER — CHLORHEXIDINE GLUCONATE ORAL RINSE 1.2 MG/ML
1 SOLUTION DENTAL DAILY
Refills: 0 | Status: DISCONTINUED | OUTPATIENT
Start: 2024-10-03 | End: 2024-10-03

## 2024-10-03 RX ADMIN — CHLORHEXIDINE GLUCONATE ORAL RINSE 1 APPLICATION(S): 1.2 SOLUTION DENTAL at 11:47

## 2024-10-03 RX ADMIN — Medication 5000 UNIT(S): at 06:11

## 2024-10-03 RX ADMIN — Medication 25 MILLIGRAM(S): at 06:11

## 2024-10-03 RX ADMIN — CHLORHEXIDINE GLUCONATE ORAL RINSE 1 APPLICATION(S): 1.2 SOLUTION DENTAL at 06:12

## 2024-10-03 RX ADMIN — PANTOPRAZOLE SODIUM 40 MILLIGRAM(S): 40 TABLET, DELAYED RELEASE ORAL at 06:23

## 2024-10-03 RX ADMIN — Medication 17 GRAM(S): at 06:10

## 2024-10-03 RX ADMIN — Medication 500 MILLIGRAM(S): at 06:12

## 2024-10-03 RX ADMIN — Medication 650 MILLIGRAM(S): at 06:11

## 2024-10-03 NOTE — DISCHARGE NOTE NURSING/CASE MANAGEMENT/SOCIAL WORK - NURSING SECTION COMPLETE
Patient/Caregiver provided printed discharge information. Low Acute Suicide Risk She presents disheveled and unkempt with high anxiety, paranoia and delusions of persecution from her , suspected olfactory hallucinations related to perfumes and fear of death from medical illness.

## 2024-10-03 NOTE — DISCHARGE NOTE NURSING/CASE MANAGEMENT/SOCIAL WORK - PATIENT PORTAL LINK FT
You can access the FollowMyHealth Patient Portal offered by [facility] by registering at the following website: [email or web address]. By joining [employer]’s FollowMyHealth portal, you will also be able to view your health information using other applications (apps) compatible with our system.

## 2024-10-15 ENCOUNTER — INPATIENT (INPATIENT)
Facility: HOSPITAL | Age: 84
LOS: 2 days | Discharge: NURSING, REHAB OR LONG-TERM CARE | DRG: 536 | End: 2024-10-18
Attending: INTERNAL MEDICINE | Admitting: STUDENT IN AN ORGANIZED HEALTH CARE EDUCATION/TRAINING PROGRAM
Payer: MEDICARE

## 2024-10-15 VITALS
WEIGHT: 149.91 LBS | OXYGEN SATURATION: 98 % | SYSTOLIC BLOOD PRESSURE: 142 MMHG | RESPIRATION RATE: 18 BRPM | HEIGHT: 65 IN | TEMPERATURE: 98 F | HEART RATE: 93 BPM | DIASTOLIC BLOOD PRESSURE: 64 MMHG

## 2024-10-15 DIAGNOSIS — Z96.641 PRESENCE OF RIGHT ARTIFICIAL HIP JOINT: ICD-10-CM

## 2024-10-15 DIAGNOSIS — Z79.82 LONG TERM (CURRENT) USE OF ASPIRIN: ICD-10-CM

## 2024-10-15 DIAGNOSIS — D63.8 ANEMIA IN OTHER CHRONIC DISEASES CLASSIFIED ELSEWHERE: ICD-10-CM

## 2024-10-15 DIAGNOSIS — Z79.631 LONG TERM (CURRENT) USE OF ANTIMETABOLITE AGENT: ICD-10-CM

## 2024-10-15 DIAGNOSIS — M06.9 RHEUMATOID ARTHRITIS, UNSPECIFIED: ICD-10-CM

## 2024-10-15 DIAGNOSIS — Z87.11 PERSONAL HISTORY OF PEPTIC ULCER DISEASE: ICD-10-CM

## 2024-10-15 DIAGNOSIS — Z95.5 PRESENCE OF CORONARY ANGIOPLASTY IMPLANT AND GRAFT: ICD-10-CM

## 2024-10-15 DIAGNOSIS — Z88.0 ALLERGY STATUS TO PENICILLIN: ICD-10-CM

## 2024-10-15 DIAGNOSIS — M80.0AXA AGE-RELATED OSTEOPOROSIS WITH CURRENT PATHOLOGICAL FRACTURE, OTHER SITE, INITIAL ENCOUNTER FOR FRACTURE: ICD-10-CM

## 2024-10-15 DIAGNOSIS — D84.821 IMMUNODEFICIENCY DUE TO DRUGS: ICD-10-CM

## 2024-10-15 DIAGNOSIS — I12.9 HYPERTENSIVE CHRONIC KIDNEY DISEASE WITH STAGE 1 THROUGH STAGE 4 CHRONIC KIDNEY DISEASE, OR UNSPECIFIED CHRONIC KIDNEY DISEASE: ICD-10-CM

## 2024-10-15 DIAGNOSIS — D53.9 NUTRITIONAL ANEMIA, UNSPECIFIED: ICD-10-CM

## 2024-10-15 DIAGNOSIS — Z96.653 PRESENCE OF ARTIFICIAL KNEE JOINT, BILATERAL: ICD-10-CM

## 2024-10-15 DIAGNOSIS — I25.10 ATHEROSCLEROTIC HEART DISEASE OF NATIVE CORONARY ARTERY WITHOUT ANGINA PECTORIS: ICD-10-CM

## 2024-10-15 DIAGNOSIS — Z98.890 OTHER SPECIFIED POSTPROCEDURAL STATES: Chronic | ICD-10-CM

## 2024-10-15 DIAGNOSIS — K58.9 IRRITABLE BOWEL SYNDROME, UNSPECIFIED: ICD-10-CM

## 2024-10-15 DIAGNOSIS — E78.5 HYPERLIPIDEMIA, UNSPECIFIED: ICD-10-CM

## 2024-10-15 LAB
ALBUMIN SERPL ELPH-MCNC: 3.7 G/DL — SIGNIFICANT CHANGE UP (ref 3.5–5.2)
ALP SERPL-CCNC: 142 U/L — HIGH (ref 30–115)
ALT FLD-CCNC: 12 U/L — SIGNIFICANT CHANGE UP (ref 0–41)
ANION GAP SERPL CALC-SCNC: 12 MMOL/L — SIGNIFICANT CHANGE UP (ref 7–14)
APTT BLD: 30.8 SEC — SIGNIFICANT CHANGE UP (ref 27–39.2)
AST SERPL-CCNC: 18 U/L — SIGNIFICANT CHANGE UP (ref 0–41)
BASOPHILS # BLD AUTO: 0.03 K/UL — SIGNIFICANT CHANGE UP (ref 0–0.2)
BASOPHILS NFR BLD AUTO: 0.3 % — SIGNIFICANT CHANGE UP (ref 0–1)
BILIRUB SERPL-MCNC: 0.6 MG/DL — SIGNIFICANT CHANGE UP (ref 0.2–1.2)
BUN SERPL-MCNC: 27 MG/DL — HIGH (ref 10–20)
CALCIUM SERPL-MCNC: 8.6 MG/DL — SIGNIFICANT CHANGE UP (ref 8.4–10.5)
CHLORIDE SERPL-SCNC: 104 MMOL/L — SIGNIFICANT CHANGE UP (ref 98–110)
CO2 SERPL-SCNC: 27 MMOL/L — SIGNIFICANT CHANGE UP (ref 17–32)
CREAT SERPL-MCNC: 1.1 MG/DL — SIGNIFICANT CHANGE UP (ref 0.7–1.5)
EGFR: 50 ML/MIN/1.73M2 — LOW
EOSINOPHIL # BLD AUTO: 0.15 K/UL — SIGNIFICANT CHANGE UP (ref 0–0.7)
EOSINOPHIL NFR BLD AUTO: 1.3 % — SIGNIFICANT CHANGE UP (ref 0–8)
GLUCOSE SERPL-MCNC: 110 MG/DL — HIGH (ref 70–99)
HCT VFR BLD CALC: 31.3 % — LOW (ref 37–47)
HGB BLD-MCNC: 9.6 G/DL — LOW (ref 12–16)
IMM GRANULOCYTES NFR BLD AUTO: 0.4 % — HIGH (ref 0.1–0.3)
INR BLD: 1.01 RATIO — SIGNIFICANT CHANGE UP (ref 0.65–1.3)
LYMPHOCYTES # BLD AUTO: 0.69 K/UL — LOW (ref 1.2–3.4)
LYMPHOCYTES # BLD AUTO: 6 % — LOW (ref 20.5–51.1)
MCHC RBC-ENTMCNC: 30.7 G/DL — LOW (ref 32–37)
MCHC RBC-ENTMCNC: 32 PG — HIGH (ref 27–31)
MCV RBC AUTO: 104.3 FL — HIGH (ref 81–99)
MONOCYTES # BLD AUTO: 1 K/UL — HIGH (ref 0.1–0.6)
MONOCYTES NFR BLD AUTO: 8.8 % — SIGNIFICANT CHANGE UP (ref 1.7–9.3)
NEUTROPHILS # BLD AUTO: 9.5 K/UL — HIGH (ref 1.4–6.5)
NEUTROPHILS NFR BLD AUTO: 83.2 % — HIGH (ref 42.2–75.2)
NRBC # BLD: 0 /100 WBCS — SIGNIFICANT CHANGE UP (ref 0–0)
PLATELET # BLD AUTO: 267 K/UL — SIGNIFICANT CHANGE UP (ref 130–400)
PMV BLD: 10.4 FL — SIGNIFICANT CHANGE UP (ref 7.4–10.4)
POTASSIUM SERPL-MCNC: 3.8 MMOL/L — SIGNIFICANT CHANGE UP (ref 3.5–5)
POTASSIUM SERPL-SCNC: 3.8 MMOL/L — SIGNIFICANT CHANGE UP (ref 3.5–5)
PROT SERPL-MCNC: 6.3 G/DL — SIGNIFICANT CHANGE UP (ref 6–8)
PROTHROM AB SERPL-ACNC: 11.5 SEC — SIGNIFICANT CHANGE UP (ref 9.95–12.87)
RBC # BLD: 3 M/UL — LOW (ref 4.2–5.4)
RBC # FLD: 15.7 % — HIGH (ref 11.5–14.5)
SODIUM SERPL-SCNC: 143 MMOL/L — SIGNIFICANT CHANGE UP (ref 135–146)
WBC # BLD: 11.42 K/UL — HIGH (ref 4.8–10.8)
WBC # FLD AUTO: 11.42 K/UL — HIGH (ref 4.8–10.8)

## 2024-10-15 PROCEDURE — 99285 EMERGENCY DEPT VISIT HI MDM: CPT

## 2024-10-15 PROCEDURE — 93010 ELECTROCARDIOGRAM REPORT: CPT

## 2024-10-15 PROCEDURE — 73502 X-RAY EXAM HIP UNI 2-3 VIEWS: CPT | Mod: 26,LT

## 2024-10-15 NOTE — ED ADULT NURSE NOTE - OBJECTIVE STATEMENT
Pt was recently in rehab for hip and back pain, pt went home and was progressively worst, today she was walking to the bathroom when she heard a pop in her left hip and was not able to weight bear since.

## 2024-10-15 NOTE — ED PROVIDER NOTE - CLINICAL SUMMARY MEDICAL DECISION MAKING FREE TEXT BOX
Labs, EKG and imaging were ordered, where indicated.  Independent interpretation of any labs, EKG & imaging that was ordered was performed by Dr. Robert mcgregor. Appropriate medications for patient's presenting complaints were ordered and effects were reassessed, where indicated.  Patient's records (prior hospital, ED visit, and/or nursing home note) were reviewed, if available.  Additional history was obtained from EMS, family, and/or PCP (where available).  Escalation to admission/observation was considered.  Patient requires inpatient hospitalization - monitored setting.     atraumatic L pelvic fx, likely 2/2 h/o RA/OA/OP - pt unable to ambulate, will admit for PT/Rehab c/s & further mgmt

## 2024-10-15 NOTE — ED ADULT TRIAGE NOTE - CHIEF COMPLAINT QUOTE
Pt BIBA from home c/o of L hip pain that's been getting worse. Pt denies falling or any trauma to the area. Pt believes her L hip is broken tho due to the pain. Pt suppose to be scheduling a L hip replacement this Thursday with Dr Gautam

## 2024-10-15 NOTE — ED PROVIDER NOTE - PHYSICAL EXAMINATION
elderly F   skin warm, dry  ncat  neck supple  rrr nl s1s2 no mrg  ctab no wrr  abd soft ntnd no palpable masses no rgr  back non-tender no cvat  pelvis- stable; R hip rom intact but painful to groin, +ttp w/axial loading of ext, NVID; remainder of ext exam nl  ext no cce dpi  neuro aaox3 grossly nf exam

## 2024-10-15 NOTE — ED PROVIDER NOTE - OBJECTIVE STATEMENT
84-year-old female BPH HTN, HL, PAD s/p cauterization, RA/OA on methotrexate, CKD, recent spinal fracture s/p rehab at [facility], recently discharged to home, chronic left hip pain pending outpatient Ortho eval for possible hip replacement with Dr. Velasquez this week, presenting with sudden onset left hip pain x 1 day.  Patient states she was using her walker to go to the bathroom around 7 PM, felt sudden pain and heard a crack in her left hip, now with pain to the left groin and unable to ambulate.  No other symptoms.  No saddle anesthesia, B/B incontinence, focal numbness or weakness. PSH: R THR & bl TKR (Dr. Dominguez), R femur fx s/p repair (Dr. Velasquez).

## 2024-10-16 DIAGNOSIS — S32.9XXA FRACTURE OF UNSPECIFIED PARTS OF LUMBOSACRAL SPINE AND PELVIS, INITIAL ENCOUNTER FOR CLOSED FRACTURE: ICD-10-CM

## 2024-10-16 DIAGNOSIS — Z96.641 PRESENCE OF RIGHT ARTIFICIAL HIP JOINT: Chronic | ICD-10-CM

## 2024-10-16 LAB
ALBUMIN SERPL ELPH-MCNC: 3.1 G/DL — LOW (ref 3.5–5.2)
ALP SERPL-CCNC: 123 U/L — HIGH (ref 30–115)
ALT FLD-CCNC: 11 U/L — SIGNIFICANT CHANGE UP (ref 0–41)
ANION GAP SERPL CALC-SCNC: 10 MMOL/L — SIGNIFICANT CHANGE UP (ref 7–14)
AST SERPL-CCNC: 14 U/L — SIGNIFICANT CHANGE UP (ref 0–41)
BASOPHILS # BLD AUTO: 0.04 K/UL — SIGNIFICANT CHANGE UP (ref 0–0.2)
BASOPHILS NFR BLD AUTO: 0.4 % — SIGNIFICANT CHANGE UP (ref 0–1)
BILIRUB SERPL-MCNC: 0.4 MG/DL — SIGNIFICANT CHANGE UP (ref 0.2–1.2)
BLD GP AB SCN SERPL QL: SIGNIFICANT CHANGE UP
BUN SERPL-MCNC: 25 MG/DL — HIGH (ref 10–20)
CALCIUM SERPL-MCNC: 8.2 MG/DL — LOW (ref 8.4–10.5)
CHLORIDE SERPL-SCNC: 102 MMOL/L — SIGNIFICANT CHANGE UP (ref 98–110)
CO2 SERPL-SCNC: 28 MMOL/L — SIGNIFICANT CHANGE UP (ref 17–32)
CREAT SERPL-MCNC: 1 MG/DL — SIGNIFICANT CHANGE UP (ref 0.7–1.5)
EGFR: 56 ML/MIN/1.73M2 — LOW
EOSINOPHIL # BLD AUTO: 0.18 K/UL — SIGNIFICANT CHANGE UP (ref 0–0.7)
EOSINOPHIL NFR BLD AUTO: 1.6 % — SIGNIFICANT CHANGE UP (ref 0–8)
GLUCOSE SERPL-MCNC: 102 MG/DL — HIGH (ref 70–99)
HCT VFR BLD CALC: 29.7 % — LOW (ref 37–47)
HGB BLD-MCNC: 9 G/DL — LOW (ref 12–16)
IMM GRANULOCYTES NFR BLD AUTO: 1 % — HIGH (ref 0.1–0.3)
LYMPHOCYTES # BLD AUTO: 0.65 K/UL — LOW (ref 1.2–3.4)
LYMPHOCYTES # BLD AUTO: 5.8 % — LOW (ref 20.5–51.1)
MAGNESIUM SERPL-MCNC: 1.9 MG/DL — SIGNIFICANT CHANGE UP (ref 1.8–2.4)
MCHC RBC-ENTMCNC: 30.3 G/DL — LOW (ref 32–37)
MCHC RBC-ENTMCNC: 31.3 PG — HIGH (ref 27–31)
MCV RBC AUTO: 103.1 FL — HIGH (ref 81–99)
MONOCYTES # BLD AUTO: 0.71 K/UL — HIGH (ref 0.1–0.6)
MONOCYTES NFR BLD AUTO: 6.4 % — SIGNIFICANT CHANGE UP (ref 1.7–9.3)
NEUTROPHILS # BLD AUTO: 9.45 K/UL — HIGH (ref 1.4–6.5)
NEUTROPHILS NFR BLD AUTO: 84.8 % — HIGH (ref 42.2–75.2)
NRBC # BLD: 0 /100 WBCS — SIGNIFICANT CHANGE UP (ref 0–0)
PLATELET # BLD AUTO: 266 K/UL — SIGNIFICANT CHANGE UP (ref 130–400)
PMV BLD: 10 FL — SIGNIFICANT CHANGE UP (ref 7.4–10.4)
POTASSIUM SERPL-MCNC: 3.9 MMOL/L — SIGNIFICANT CHANGE UP (ref 3.5–5)
POTASSIUM SERPL-SCNC: 3.9 MMOL/L — SIGNIFICANT CHANGE UP (ref 3.5–5)
PROT SERPL-MCNC: 5.7 G/DL — LOW (ref 6–8)
RBC # BLD: 2.88 M/UL — LOW (ref 4.2–5.4)
RBC # FLD: 15.7 % — HIGH (ref 11.5–14.5)
SODIUM SERPL-SCNC: 140 MMOL/L — SIGNIFICANT CHANGE UP (ref 135–146)
WBC # BLD: 11.14 K/UL — HIGH (ref 4.8–10.8)
WBC # FLD AUTO: 11.14 K/UL — HIGH (ref 4.8–10.8)

## 2024-10-16 PROCEDURE — 97116 GAIT TRAINING THERAPY: CPT | Mod: GP

## 2024-10-16 PROCEDURE — 97162 PT EVAL MOD COMPLEX 30 MIN: CPT | Mod: GP

## 2024-10-16 PROCEDURE — 99223 1ST HOSP IP/OBS HIGH 75: CPT

## 2024-10-16 PROCEDURE — 85045 AUTOMATED RETICULOCYTE COUNT: CPT

## 2024-10-16 PROCEDURE — 83540 ASSAY OF IRON: CPT

## 2024-10-16 PROCEDURE — 80053 COMPREHEN METABOLIC PANEL: CPT

## 2024-10-16 PROCEDURE — 82607 VITAMIN B-12: CPT

## 2024-10-16 PROCEDURE — 97530 THERAPEUTIC ACTIVITIES: CPT | Mod: GP

## 2024-10-16 PROCEDURE — 82728 ASSAY OF FERRITIN: CPT

## 2024-10-16 PROCEDURE — 85027 COMPLETE CBC AUTOMATED: CPT

## 2024-10-16 PROCEDURE — 83735 ASSAY OF MAGNESIUM: CPT

## 2024-10-16 PROCEDURE — 36415 COLL VENOUS BLD VENIPUNCTURE: CPT

## 2024-10-16 PROCEDURE — 82746 ASSAY OF FOLIC ACID SERUM: CPT

## 2024-10-16 PROCEDURE — 85025 COMPLETE CBC W/AUTO DIFF WBC: CPT

## 2024-10-16 PROCEDURE — 80048 BASIC METABOLIC PNL TOTAL CA: CPT

## 2024-10-16 PROCEDURE — 83550 IRON BINDING TEST: CPT

## 2024-10-16 RX ORDER — INFLUENZA VIRUS VACCINE 15; 15; 15; 15 UG/.5ML; UG/.5ML; UG/.5ML; UG/.5ML
0.5 SUSPENSION INTRAMUSCULAR ONCE
Refills: 0 | Status: DISCONTINUED | OUTPATIENT
Start: 2024-10-16 | End: 2024-10-18

## 2024-10-16 RX ORDER — OXYCODONE HYDROCHLORIDE 30 MG/1
5 TABLET, FILM COATED, EXTENDED RELEASE ORAL EVERY 6 HOURS
Refills: 0 | Status: DISCONTINUED | OUTPATIENT
Start: 2024-10-16 | End: 2024-10-18

## 2024-10-16 RX ORDER — ATORVASTATIN CALCIUM 10 MG/1
80 TABLET, FILM COATED ORAL AT BEDTIME
Refills: 0 | Status: DISCONTINUED | OUTPATIENT
Start: 2024-10-16 | End: 2024-10-18

## 2024-10-16 RX ORDER — METOPROLOL TARTRATE 50 MG
25 TABLET ORAL
Refills: 0 | Status: DISCONTINUED | OUTPATIENT
Start: 2024-10-16 | End: 2024-10-18

## 2024-10-16 RX ORDER — ESCITALOPRAM OXALATE 10 MG
10 TABLET ORAL DAILY
Refills: 0 | Status: DISCONTINUED | OUTPATIENT
Start: 2024-10-16 | End: 2024-10-18

## 2024-10-16 RX ORDER — FOLIC ACID 1 MG/1
1 TABLET ORAL DAILY
Refills: 0 | Status: DISCONTINUED | OUTPATIENT
Start: 2024-10-16 | End: 2024-10-18

## 2024-10-16 RX ORDER — ACETAMINOPHEN 325 MG
650 TABLET ORAL EVERY 6 HOURS
Refills: 0 | Status: DISCONTINUED | OUTPATIENT
Start: 2024-10-16 | End: 2024-10-16

## 2024-10-16 RX ORDER — ENOXAPARIN SODIUM 150 MG/ML
40 INJECTION SUBCUTANEOUS EVERY 24 HOURS
Refills: 0 | Status: DISCONTINUED | OUTPATIENT
Start: 2024-10-16 | End: 2024-10-18

## 2024-10-16 RX ORDER — ACETAMINOPHEN 325 MG
975 TABLET ORAL EVERY 8 HOURS
Refills: 0 | Status: DISCONTINUED | OUTPATIENT
Start: 2024-10-16 | End: 2024-10-18

## 2024-10-16 RX ORDER — ASPIRIN 325 MG
81 TABLET ORAL DAILY
Refills: 0 | Status: DISCONTINUED | OUTPATIENT
Start: 2024-10-16 | End: 2024-10-18

## 2024-10-16 RX ORDER — 5-HYDROXYTRYPTOPHAN (5-HTP) 100 MG
6 TABLET,DISINTEGRATING ORAL AT BEDTIME
Refills: 0 | Status: DISCONTINUED | OUTPATIENT
Start: 2024-10-16 | End: 2024-10-18

## 2024-10-16 RX ORDER — OXYCODONE AND ACETAMINOPHEN 5; 325 MG/1; MG/1
1 TABLET ORAL ONCE
Refills: 0 | Status: DISCONTINUED | OUTPATIENT
Start: 2024-10-16 | End: 2024-10-16

## 2024-10-16 RX ORDER — CYANOCOBALAMIN (VITAMIN B-12) 1000MCG/ML
1000 VIAL (ML) INJECTION DAILY
Refills: 0 | Status: DISCONTINUED | OUTPATIENT
Start: 2024-10-16 | End: 2024-10-18

## 2024-10-16 RX ADMIN — Medication 25 MILLIGRAM(S): at 05:17

## 2024-10-16 RX ADMIN — Medication 1000 MICROGRAM(S): at 11:33

## 2024-10-16 RX ADMIN — Medication 25 MILLIGRAM(S): at 17:13

## 2024-10-16 RX ADMIN — ENOXAPARIN SODIUM 40 MILLIGRAM(S): 150 INJECTION SUBCUTANEOUS at 04:05

## 2024-10-16 RX ADMIN — ATORVASTATIN CALCIUM 80 MILLIGRAM(S): 10 TABLET, FILM COATED ORAL at 21:37

## 2024-10-16 RX ADMIN — OXYCODONE HYDROCHLORIDE 5 MILLIGRAM(S): 30 TABLET, FILM COATED, EXTENDED RELEASE ORAL at 21:36

## 2024-10-16 RX ADMIN — Medication 10 MILLIGRAM(S): at 11:33

## 2024-10-16 RX ADMIN — Medication 81 MILLIGRAM(S): at 11:33

## 2024-10-16 RX ADMIN — OXYCODONE HYDROCHLORIDE 5 MILLIGRAM(S): 30 TABLET, FILM COATED, EXTENDED RELEASE ORAL at 04:05

## 2024-10-16 RX ADMIN — Medication 975 MILLIGRAM(S): at 13:27

## 2024-10-16 RX ADMIN — Medication 975 MILLIGRAM(S): at 05:17

## 2024-10-16 RX ADMIN — FOLIC ACID 1 MILLIGRAM(S): 1 TABLET ORAL at 11:33

## 2024-10-16 NOTE — H&P ADULT - NSICDXPASTSURGICALHX_GEN_ALL_CORE_FT
PAST SURGICAL HISTORY:  History of surgery BILATERAL KNEE REPLACEMENT,    RIGHT HIP REPLACEMENT,  MARY IN RIGHT FEMUR,  CARDIAC STENTS X2,   CHOLECYSTECTOMY, right IOL    S/P total right hip arthroplasty

## 2024-10-16 NOTE — H&P ADULT - NSHPPHYSICALEXAM_GEN_ALL_CORE
Physical Exam:  General: NAD, well appearing  HEENT: EOMI, PERRLA, conjunctiva and sclera clear, moist mucus membranes  Cardio: +S1/S2, no murmurs, rubs, or gallops, +pedal pulses  Pulm: CTA b/l, no wheezes, rales, or rhonchi  Abd: no TTP, nondistended, no organomegaly  Neuro: AAOx4, neurovascularly intact  Skin: No rashes or lesions  MSK: no edema, R hip full ROM with minor tenderness in the groin, left hip exam restricted due to pain

## 2024-10-16 NOTE — CONSULT NOTE ADULT - SUBJECTIVE AND OBJECTIVE BOX
HPI:  Patient is an 84 year old female with PMH of HTN, HL, PAD s/p cauterization, RA/OA on methotrexate, CKD, recent spinal fracture s/p rehab at [facility] (discharged on 10/3) presenting for left hip pain. She was waiting on an outpatient ortho appointment for evaluation of hip replacement when she had sudden worsening pain while ambulating via walker to the bathroom. She heard a crack in her left hip and sat in a chair, no longer able to ambulate. Denies fall, LOC. Able to complete ADLs. Pain radiates to the left groin, 10/10 at baseline, 3/10 after 5mg percocet.    ROS positive for hip pain, negative for all other systems.    In the ED:  Vitals stable  Labs: Hgb 9 (baseline), WBC 11.4  Xray: L inferior pubic ramus fracture and severe degenerative changes, R total arthroplasty    Admitted for pain management     -no acute orthopedic intervention  -WBAT with walker      PAST MEDICAL & SURGICAL HISTORY:  Hypertension      History of hip surgery      H/O knee surgery      Other irritable bowel syndrome      Mitral valve disorder      Arthritis      Basal cell carcinoma      CAD (coronary artery disease)      Chronic kidney disease (CKD)      PUD (peptic ulcer disease)      History of surgery  BILATERAL KNEE REPLACEMENT,    RIGHT HIP REPLACEMENT,  MARY IN RIGHT FEMUR,  CARDIAC STENTS X2,   CHOLECYSTECTOMY, right IOL      S/P total right hip arthroplasty          Hospital Course:    TODAY'S SUBJECTIVE & REVIEW OF SYMPTOMS:     Constitutional WNL   Cardio WNL   Resp WNL   GI WNL  Heme WNL  Endo WNL  Skin WNL  MSK left hip pain   Neuro WNL  Cognitive WNL  Psych WNL      MEDICATIONS  (STANDING):  acetaminophen     Tablet .. 975 milliGRAM(s) Oral every 8 hours  aspirin enteric coated 81 milliGRAM(s) Oral daily  atorvastatin 80 milliGRAM(s) Oral at bedtime  cyanocobalamin 1000 MICROGram(s) Oral daily  enoxaparin Injectable 40 milliGRAM(s) SubCutaneous every 24 hours  escitalopram 10 milliGRAM(s) Oral daily  folic acid 1 milliGRAM(s) Oral daily  metoprolol tartrate 25 milliGRAM(s) Oral two times a day    MEDICATIONS  (PRN):  melatonin 6 milliGRAM(s) Oral at bedtime PRN Insomnia  oxyCODONE    IR 5 milliGRAM(s) Oral every 6 hours PRN Moderate Pain (4 - 6)      FAMILY HISTORY:      Allergies    penicillin (Rash; Anaphylaxis; Hives)    Intolerances        SOCIAL HISTORY:    [  ] Etoh  [  ] Smoking  [  ] Substance abuse     Home Environment:  [   ] Home Alone  [ x  ] Lives with Family  [   ] Home Health Aid    Dwelling:  [   ] Apartment  [x   ] Private House  [   ] Adult Home  [   ] Skilled Nursing Facility      [   ] Short Term  [   ] Long Term  [ x  ] Stairs       Elevator [   ]    FUNCTIONAL STATUS PTA: (Check all that apply)  Ambulation: [ x   ]Independent    [   ] Dependent     [   ] Non-Ambulatory  Assistive Device: [   ] SA Cane  [   ]  Q Cane  [ x  ] Walker  [   ]  Wheelchair  ADL : [ x  ] Independent  [    ]  Dependent       Vital Signs Last 24 Hrs  T(C): 36.6 (16 Oct 2024 17:21), Max: 36.7 (15 Oct 2024 20:01)  T(F): 97.9 (16 Oct 2024 17:21), Max: 98.1 (15 Oct 2024 20:01)  HR: 62 (16 Oct 2024 17:21) (62 - 93)  BP: 139/52 (16 Oct 2024 17:21) (117/75 - 142/64)  BP(mean): --  RR: 17 (16 Oct 2024 17:21) (17 - 18)  SpO2: 96% (16 Oct 2024 17:21) (95% - 98%)    Parameters below as of 16 Oct 2024 17:21  Patient On (Oxygen Delivery Method): nasal cannula          PHYSICAL EXAM: Awake & Alert  GENERAL: NAD  HEAD:  Normocephalic  CHEST/LUNG: Clear   HEART: S1S2+  ABDOMEN: Soft, Nontender  EXTREMITIES:  no calf tenderness    NERVOUS SYSTEM:  Cranial Nerves 2-12 intact [   ] Abnormal  [   ]  ROM: WFL all extremities [   ]  Abnormal [ x  ]limited LLE  Motor Strength: WFL all extremities  [   ]  Abnormal [ x  ]limited LLE  Sensation: intact to light touch [  x ] Abnormal [   ]    FUNCTIONAL STATUS:  Bed Mobility: Independent [   ]  Supervision [   ]  Needs Assistance [ x  ]  N/A [   ]  Transfers: Independent [   ]  Supervision [   ]  Needs Assistance [   ]  N/A [   ]   Ambulation: Independent [   ]  Supervision [   ]  Needs Assistance [   ]  N/A [   ]  ADL: Independent [   ] Requires Assistance [   ] N/A [   ]      LABS:                        9.0    11.14 )-----------( 266      ( 16 Oct 2024 11:45 )             29.7     10-16    140  |  102  |  25[H]  ----------------------------<  102[H]  3.9   |  28  |  1.0    Ca    8.2[L]      16 Oct 2024 11:45  Mg     1.9     10-16    TPro  5.7[L]  /  Alb  3.1[L]  /  TBili  0.4  /  DBili  x   /  AST  14  /  ALT  11  /  AlkPhos  123[H]  10-16    PT/INR - ( 15 Oct 2024 23:08 )   PT: 11.50 sec;   INR: 1.01 ratio         PTT - ( 15 Oct 2024 23:08 )  PTT:30.8 sec  Urinalysis Basic - ( 16 Oct 2024 11:45 )    Color: x / Appearance: x / SG: x / pH: x  Gluc: 102 mg/dL / Ketone: x  / Bili: x / Urobili: x   Blood: x / Protein: x / Nitrite: x   Leuk Esterase: x / RBC: x / WBC x   Sq Epi: x / Non Sq Epi: x / Bacteria: x        RADIOLOGY & ADDITIONAL STUDIES:

## 2024-10-16 NOTE — CONSULT NOTE ADULT - ASSESSMENT
IMPRESSION: Rehab of left pelvic fx / osteoporosis, HTN, HL, PAD s/p cauterization, RA/OA on methotrexate, CKD, recent spinal fracture s/p rehab at [facility]    PRECAUTIONS: [   ] Cardiac  [   ] Respiratory  [   ] Seizures [   ] Contact Isolation  [   ] Droplet Isolation  [   ] Other    Weight Bearing Status: WBAT LLE    RECOMMENDATION:    Out of Bed to Chair     DVT/Decubiti Prophylaxis    REHAB PLAN:     [  x  ] Bedside P/T 3-5 times a week   [    ]   Bedside O/T  2-3 times a week             [    ] Speech Therapy               [    ]  No Rehab Therapy Indicated   Conditioning/ROM                                    ADL  Bed Mobility                                               Conditioning/ROM  Transfers                                                     Bed Mobility  Sitting /Standing Balance                         Transfers                                        Gait Training                                               Sitting/Standing Balance  Stair Training [   ]Applicable                    Home equipment Eval                                                                        Splinting  [   ] Only      GOALS:   ADL   [    ]   Independent                    Transfers  [  x  ] Independent                          Ambulation  [    x] Independent     [  x   ] With device                            [    ]  CG                                                         [    ]  CG                                                                  [    ] CG                            [    ] Min A                                                   [    ] Min A                                                              [    ] Min  A          DISCHARGE PLAN:   [    ]  Good candidate for Intensive Rehabilitation/Hospital based                                             Will tolerate 3hrs Intensive Rehab Daily                                       [  x   ]  Short Term Rehab in Skilled Nursing Facility                                       [     ]  Home with Outpatient or  services                                         [     ]  Possible Candidate for Intensive Hospital based Rehab

## 2024-10-16 NOTE — H&P ADULT - ATTENDING COMMENTS
Patient is an 84 year old female with PMH of HTN, HL, PAD s/p cauterization, RA/OA on methotrexate, CKD, recent spinal fracture s/p rehab at [facility] (discharged on 10/3) presenting for left hip pain. She was waiting on an outpatient ortho appointment for evaluation of hip replacement when she had sudden worsening pain while ambulating via walker to the bathroom. She heard a crack in her left hip and sat in a chair, no longer able to ambulate. Denies fall, LOC. Able to complete ADLs. Pain radiates to the left groin, 10/10 at baseline, 3/10 after 5mg percocet.    Impression:    #L pubic ramus fracture  - Xray pelvis: left pubic ramus fracture with severe degenerative changes of the left hip  Plan:  - pain management: Tylenol ATC. Can use low dose Oxycodone PRN for pain control.   - PT evaluation, OOBTC, WBAT w/ walker.   - ortho evaluation noted: No acute surgical intervention. outpatient f/u with Dr. Velasquez. Eventual evaluation for left hip arthroplasty.  - Physiatry Eval: STR in SNF.   - CM aware and working on it.   - fall precautions  - Calcium/vitamin D    #CAD  - continue aspirin    #HTN  - continue home meds    #RA/OA  - continue home meds    #DVT ppx: lovenox  #Diet: DASH/TLC  #Activity: WBAT  #Code Status: full code  #Dispo: working on getting SNF.

## 2024-10-16 NOTE — PHYSICAL THERAPY INITIAL EVALUATION ADULT - PERTINENT HX OF CURRENT PROBLEM, REHAB EVAL
84 year old female with PMH of HTN, HL, PAD s/p cauterization, RA/OA on methotrexate, CKD, recent spinal fracture s/p rehab at [facility] (discharged on 10/3) presenting for left hip pain. She was waiting on an outpatient ortho appointment for evaluation of hip replacement when she had sudden worsening pain while ambulating via walker to the bathroom. She heard a crack in her left hip and sat in a chair, no longer able to ambulate.

## 2024-10-16 NOTE — ED ADULT NURSE REASSESSMENT NOTE - NS ED NURSE REASSESS COMMENT FT1
Pt AAOX4, able to make needs known, Pt V/S updated and pt medicated, report given to Susan FRANCIS and pt transported to ED3.

## 2024-10-16 NOTE — H&P ADULT - NSHPLABSRESULTS_GEN_ALL_CORE
LABS:                          9.6    11.42 )-----------( 267      ( 15 Oct 2024 23:08 )             31.3     10-15    143  |  104  |  27[H]  ----------------------------<  110[H]  3.8   |  27  |  1.1    Ca    8.6      15 Oct 2024 23:08    TPro  6.3  /  Alb  3.7  /  TBili  0.6  /  DBili  x   /  AST  18  /  ALT  12  /  AlkPhos  142[H]  10-15    PT/INR - ( 15 Oct 2024 23:08 )   PT: 11.50 sec;   INR: 1.01 ratio         PTT - ( 15 Oct 2024 23:08 )  PTT:30.8 sec

## 2024-10-16 NOTE — PHYSICAL THERAPY INITIAL EVALUATION ADULT - ADDITIONAL COMMENTS
Pt resides in 1st floor of a private house with one step to enter, daughter resides in 2nd floor. Pt used to be independent with BADLs, able to ambulate using RW at baseline

## 2024-10-16 NOTE — CONSULT NOTE ADULT - SUBJECTIVE AND OBJECTIVE BOX
Orthopaedic Surgery Consult Note      HPI:  Patient is an 84 year old female with PMH of HTN, HL, PAD s/p cauterization, RA/OA on methotrexate, CKD, recent spinal fracture s/p rehab at [facility] (discharged on 10/3) presenting for left hip pain. Patient is known to orthopedic service.  Patient states she was at home ambulating with her walker, walking to the bathroom when she felt a crack and a sharp pain in her left groin area. She did not fall, she was able to sit down afterwards but was unable to ambulate so she called EMS. Patient has had left hip pain for many years she follows with  outpatient for left hip severe OA.   Of note patient had a right hip replacement in the past complicated by proximal femur fracture that required ORIF. doing well with right hip since surgery, no complications or pain after injury today.           Allergies  penicillin (Rash; Anaphylaxis; Hives)      PAST MEDICAL & SURGICAL HISTORY:  Hypertension  History of hip surgery  H/O knee surgery  Other irritable bowel syndrome  Mitral valve disorder  Arthritis  Basal cell carcinoma  CAD (coronary artery disease)  Chronic kidney disease (CKD)  PUD (peptic ulcer disease)    History of surgery  BILATERAL KNEE REPLACEMENT,    RIGHT HIP REPLACEMENT,  MARY IN RIGHT FEMUR,  CARDIAC STENTS X2,   CHOLECYSTECTOMY, right IOL  S/P total right hip arthroplasty      MEDICATIONS  (STANDING):  acetaminophen     Tablet .. 975 milliGRAM(s) Oral every 8 hours  aspirin enteric coated 81 milliGRAM(s) Oral daily  atorvastatin 80 milliGRAM(s) Oral at bedtime  cyanocobalamin 1000 MICROGram(s) Oral daily  enoxaparin Injectable 40 milliGRAM(s) SubCutaneous every 24 hours  escitalopram 10 milliGRAM(s) Oral daily  folic acid 1 milliGRAM(s) Oral daily  metoprolol tartrate 25 milliGRAM(s) Oral two times a day    MEDICATIONS  (PRN):  melatonin 6 milliGRAM(s) Oral at bedtime PRN Insomnia  oxyCODONE    IR 5 milliGRAM(s) Oral every 6 hours PRN Moderate Pain (4 - 6)      Vital Signs Last 24 Hrs  T(C): 36.5 (16 Oct 2024 16:03), Max: 36.7 (15 Oct 2024 20:01)  T(F): 97.7 (16 Oct 2024 16:03), Max: 98.1 (15 Oct 2024 20:01)  HR: 72 (16 Oct 2024 16:03) (63 - 93)  BP: 137/56 (16 Oct 2024 16:03) (117/75 - 142/64)  BP(mean): --  RR: 18 (16 Oct 2024 16:03) (17 - 18)  SpO2: 95% (16 Oct 2024 16:03) (95% - 98%)    Parameters below as of 16 Oct 2024 16:03  Patient On (Oxygen Delivery Method): nasal cannula  O2 Flow (L/min): 2      Physical Exam:  Patient laying in bed in NAD    LLE:  skin intact  pt unable to SLR (patient states this is baseline for her since she has such bad hip OA)  no pain with log rolling  no pain with axial compression   pain with hip flexion in groin   SILT sp/dp/t/sural/saph  firing quads/hamstrings/ta/ehl/fhl/gs                        9.0    11.14 )-----------( 266      ( 16 Oct 2024 11:45 )             29.7     10-16    140  |  102  |  25[H]  ----------------------------<  102[H]  3.9   |  28  |  1.0    Ca    8.2[L]      16 Oct 2024 11:45  Mg     1.9     10-16    TPro  5.7[L]  /  Alb  3.1[L]  /  TBili  0.4  /  DBili  x   /  AST  14  /  ALT  11  /  AlkPhos  123[H]  10-16    PT/INR - ( 15 Oct 2024 23:08 )   PT: 11.50 sec;   INR: 1.01 ratio         PTT - ( 15 Oct 2024 23:08 )  PTT:30.8 sec      Imaging:   left displaced pubic rami fracture  left hip severe OA     A/P: 84yFemale with left inferior displaced pubic rami fracture   -no acte orthopedic intervention  -WBAT with walker  -PT/OT  -pain control  -dvt ppx   -f/u outpatient with orthopedic  [phone] for appointment

## 2024-10-16 NOTE — PATIENT PROFILE ADULT - FALL HARM RISK - HARM RISK INTERVENTIONS

## 2024-10-16 NOTE — H&P ADULT - HISTORY OF PRESENT ILLNESS
Patient is an 84 year old female with PMH of HTN, HL, PAD s/p cauterization, RA/OA on methotrexate, CKD, recent spinal fracture s/p rehab at [facility] (discharged on 10/3) presenting for left hip pain. She was waiting on an outpatient ortho appointment for evaluation of hip replacement when she had sudden worsening pain while ambulating via walker to the bathroom. She heard a crack in her left hip and fell, no longer able to ambulate. Pain radiates to the left groin.     ROS positive for hip pain, negative for all other systems.    In the ED:  Vitals stable  Labs: Hgb 9 (baseline), WBC 11.4,   Xray: L inferior pubic ramus fracture and severe degenerative changes, R total arthroplasty    Admitted for pain management Patient is an 84 year old female with PMH of HTN, HL, PAD s/p cauterization, RA/OA on methotrexate, CKD, recent spinal fracture s/p rehab at [facility] (discharged on 10/3) presenting for left hip pain. She was waiting on an outpatient ortho appointment for evaluation of hip replacement when she had sudden worsening pain while ambulating via walker to the bathroom. She heard a crack in her left hip and sat in a chair, no longer able to ambulate. Pain radiates to the left groin, 10/10 at baseline, 3/10 after 5mg percocet.    ROS positive for hip pain, negative for all other systems.    In the ED:  Vitals stable  Labs: Hgb 9 (baseline), WBC 11.4  Xray: L inferior pubic ramus fracture and severe degenerative changes, R total arthroplasty    Admitted for pain management Patient is an 84 year old female with PMH of HTN, HL, PAD s/p cauterization, RA/OA on methotrexate, CKD, recent spinal fracture s/p rehab at [facility] (discharged on 10/3) presenting for left hip pain. She was waiting on an outpatient ortho appointment for evaluation of hip replacement when she had sudden worsening pain while ambulating via walker to the bathroom. She heard a crack in her left hip and sat in a chair, no longer able to ambulate. Denies fall, LOC. Able to complete ADLs. Pain radiates to the left groin, 10/10 at baseline, 3/10 after 5mg percocet.    ROS positive for hip pain, negative for all other systems.    In the ED:  Vitals stable  Labs: Hgb 9 (baseline), WBC 11.4  Xray: L inferior pubic ramus fracture and severe degenerative changes, R total arthroplasty    Admitted for pain management

## 2024-10-16 NOTE — PHYSICAL THERAPY INITIAL EVALUATION ADULT - GENERAL OBSERVATIONS, REHAB EVAL
13:20-13:55. chart reviewed. Pt received semi-humphreys at B/S, alert, oriented, able to follow multi-step instructions and agreeable to PT evaluation. + IV, + O2 2 L via NC, desaturate on RA to 86%, on O2 92-94%, -ve orthostatic, denies pain at rest, complaining of L LE/groin pain 8/10 during ambulation. NAD.

## 2024-10-16 NOTE — H&P ADULT - ASSESSMENT
Patient is an 84 year old female with PMH of HTN, HL, PAD s/p cauterization, RA/OA on methotrexate, CKD, recent spinal fracture s/p rehab at [facility] (discharged on 10/3) presenting for left hip pain. She was waiting on an outpatient ortho appointment for evaluation of hip replacement when she had sudden worsening pain while ambulating via walker to the bathroom. She heard a crack in her left hip and sat in a chair, no longer able to ambulate. Denies fall, LOC. Able to complete ADLs. Pain radiates to the left groin, 10/10 at baseline, 3/10 after 5mg percocet.    Impression:    #L pubic ramus fracture  - Xray pelvis: left pubic ramus fracture with severe degenerative changes of the left hip  Plan:  - pain management: percocet 5/325 controlling pain for now, consider additional tramadol or pain management consult if pain no longer well controlled  - PT evaluation, OOBTC, WBAT  - ortho evaluation for evaluation and left hip arthroplasty  - monitor  - fall precautions  - Calcium/vitamin D    #CAD  - continue aspirin    #HTN  - continue home meds    #RA/OA  - continue home meds    #DVT ppx: lovenox  #Diet: DASH/TLC  #Activity: WBAT  #Code Status: full code  #Dispo: acute care

## 2024-10-17 ENCOUNTER — APPOINTMENT (OUTPATIENT)
Dept: ORTHOPEDIC SURGERY | Facility: CLINIC | Age: 84
End: 2024-10-17

## 2024-10-17 LAB
ANION GAP SERPL CALC-SCNC: 9 MMOL/L — SIGNIFICANT CHANGE UP (ref 7–14)
BUN SERPL-MCNC: 26 MG/DL — HIGH (ref 10–20)
CALCIUM SERPL-MCNC: 8.5 MG/DL — SIGNIFICANT CHANGE UP (ref 8.4–10.5)
CHLORIDE SERPL-SCNC: 103 MMOL/L — SIGNIFICANT CHANGE UP (ref 98–110)
CO2 SERPL-SCNC: 28 MMOL/L — SIGNIFICANT CHANGE UP (ref 17–32)
CREAT SERPL-MCNC: 1.1 MG/DL — SIGNIFICANT CHANGE UP (ref 0.7–1.5)
EGFR: 50 ML/MIN/1.73M2 — LOW
GLUCOSE SERPL-MCNC: 89 MG/DL — SIGNIFICANT CHANGE UP (ref 70–99)
HCT VFR BLD CALC: 29.5 % — LOW (ref 37–47)
HGB BLD-MCNC: 9 G/DL — LOW (ref 12–16)
MCHC RBC-ENTMCNC: 30.5 G/DL — LOW (ref 32–37)
MCHC RBC-ENTMCNC: 31.8 PG — HIGH (ref 27–31)
MCV RBC AUTO: 104.2 FL — HIGH (ref 81–99)
NRBC # BLD: 0 /100 WBCS — SIGNIFICANT CHANGE UP (ref 0–0)
PLATELET # BLD AUTO: 304 K/UL — SIGNIFICANT CHANGE UP (ref 130–400)
PMV BLD: 10.3 FL — SIGNIFICANT CHANGE UP (ref 7.4–10.4)
POTASSIUM SERPL-MCNC: 4.6 MMOL/L — SIGNIFICANT CHANGE UP (ref 3.5–5)
POTASSIUM SERPL-SCNC: 4.6 MMOL/L — SIGNIFICANT CHANGE UP (ref 3.5–5)
RBC # BLD: 2.83 M/UL — LOW (ref 4.2–5.4)
RBC # FLD: 15.5 % — HIGH (ref 11.5–14.5)
SODIUM SERPL-SCNC: 140 MMOL/L — SIGNIFICANT CHANGE UP (ref 135–146)
WBC # BLD: 7.86 K/UL — SIGNIFICANT CHANGE UP (ref 4.8–10.8)
WBC # FLD AUTO: 7.86 K/UL — SIGNIFICANT CHANGE UP (ref 4.8–10.8)

## 2024-10-17 PROCEDURE — 99232 SBSQ HOSP IP/OBS MODERATE 35: CPT

## 2024-10-17 RX ORDER — CHLORHEXIDINE GLUCONATE ORAL RINSE 1.2 MG/ML
1 SOLUTION DENTAL
Refills: 0 | Status: CANCELLED | OUTPATIENT
Start: 2024-10-22 | End: 2024-10-18

## 2024-10-17 RX ORDER — OXYCODONE HYDROCHLORIDE 30 MG/1
1 TABLET, FILM COATED, EXTENDED RELEASE ORAL
Qty: 0 | Refills: 0 | DISCHARGE
Start: 2024-10-17

## 2024-10-17 RX ORDER — 5-HYDROXYTRYPTOPHAN (5-HTP) 100 MG
2 TABLET,DISINTEGRATING ORAL
Qty: 0 | Refills: 0 | DISCHARGE
Start: 2024-10-17

## 2024-10-17 RX ORDER — CALCIUM CARBONATE/VITAMIN D3 600MG-5MCG
1 TABLET ORAL DAILY
Refills: 0 | Status: DISCONTINUED | OUTPATIENT
Start: 2024-10-17 | End: 2024-10-18

## 2024-10-17 RX ADMIN — FOLIC ACID 1 MILLIGRAM(S): 1 TABLET ORAL at 11:38

## 2024-10-17 RX ADMIN — Medication 25 MILLIGRAM(S): at 21:49

## 2024-10-17 RX ADMIN — Medication 1000 MICROGRAM(S): at 11:38

## 2024-10-17 RX ADMIN — Medication 975 MILLIGRAM(S): at 22:18

## 2024-10-17 RX ADMIN — Medication 975 MILLIGRAM(S): at 21:48

## 2024-10-17 RX ADMIN — ATORVASTATIN CALCIUM 80 MILLIGRAM(S): 10 TABLET, FILM COATED ORAL at 21:50

## 2024-10-17 RX ADMIN — ENOXAPARIN SODIUM 40 MILLIGRAM(S): 150 INJECTION SUBCUTANEOUS at 05:56

## 2024-10-17 RX ADMIN — Medication 81 MILLIGRAM(S): at 11:38

## 2024-10-17 RX ADMIN — Medication 1 TABLET(S): at 21:49

## 2024-10-17 RX ADMIN — OXYCODONE HYDROCHLORIDE 5 MILLIGRAM(S): 30 TABLET, FILM COATED, EXTENDED RELEASE ORAL at 21:49

## 2024-10-17 RX ADMIN — Medication 10 MILLIGRAM(S): at 11:37

## 2024-10-17 RX ADMIN — Medication 975 MILLIGRAM(S): at 13:03

## 2024-10-17 RX ADMIN — OXYCODONE HYDROCHLORIDE 5 MILLIGRAM(S): 30 TABLET, FILM COATED, EXTENDED RELEASE ORAL at 12:28

## 2024-10-17 RX ADMIN — Medication 975 MILLIGRAM(S): at 05:54

## 2024-10-17 RX ADMIN — OXYCODONE HYDROCHLORIDE 5 MILLIGRAM(S): 30 TABLET, FILM COATED, EXTENDED RELEASE ORAL at 22:19

## 2024-10-17 RX ADMIN — Medication 25 MILLIGRAM(S): at 05:54

## 2024-10-17 NOTE — DISCHARGE NOTE PROVIDER - NSDCCPCAREPLAN_GEN_ALL_CORE_FT
PRINCIPAL DISCHARGE DIAGNOSIS  Diagnosis: Pelvic fracture  Assessment and Plan of Treatment: You were admitted to the hospital for a pelvic fracture. You were seen by orthopedic and physiatry doctors and they recommend short term rehab for your fracture and no surgery needed. You should follow up with your primary care physician.     PRINCIPAL DISCHARGE DIAGNOSIS  Diagnosis: Pelvic fracture  Assessment and Plan of Treatment: You were admitted to the hospital for a pelvic fracture. You were seen by orthopedic and physiatry doctors and they recommend short term rehab for your fracture and no surgery needed. You should follow up with your primary care physician, rheumatologist and endocrinologist.

## 2024-10-17 NOTE — DISCHARGE NOTE PROVIDER - CARE PROVIDER_API CALL
Hoda Gautam [facility]  Orthopaedic Surgery  [address] [place] [address]  Phone: [phone]  Fax: [phone]  Follow Up Time: Routine   Hoda Gautam [facility]  Orthopaedic Surgery  [address] [place] [address]  Phone: [phone]  Fax: [phone]  Follow Up Time: Routine    Parmjit Nathan  Endocrinology/Metab/Diabetes  [address] [place] [address]  Phone: [phone]  Fax: [phone]  Follow Up Time: Routine

## 2024-10-17 NOTE — DISCHARGE NOTE PROVIDER - HOSPITAL COURSE
HPI:  Patient is an 84 year old female with PMH of HTN, HL, PAD s/p cauterization, RA/OA on methotrexate, CKD, recent spinal fracture s/p rehab at [facility] (discharged on 10/3) presenting for left hip pain. She was waiting on an outpatient ortho appointment for evaluation of hip replacement when she had sudden worsening pain while ambulating via walker to the bathroom. She heard a crack in her left hip and sat in a chair, no longer able to ambulate. Denies fall, LOC. Able to complete ADLs. Pain radiates to the left groin, 10/10 at baseline, 3/10 after 5mg percocet.    ROS positive for hip pain, negative for all other systems.    In the ED:  Vitals stable  Labs: Hgb 9 (baseline), WBC 11.4  Xray: L inferior pubic ramus fracture and severe degenerative changes, R total arthroplasty    Admitted for pain management (16 Oct 2024 00:31)    Hospital Course:    Patient has been hemodynamically stable. Pain controlled with oxycodone. Seen by ortho and no surgical intervention planned. As per physiatry will need short term rehab at SNF. Patient is ready for discharge.    A/P:    #L Pubic Ramus fracture  - Xray pelvis: left pubic ramus fracture with severe degenerative changes of the left hip  - Continue Oxycodone for moderate pain PRN  - As per Physiatry will need Short Term Rehab at SNF  - As per Ortho; no acute surgical intervention  - Calcium / Vitamin D    #CAD  - continue aspirin    #HTN  - continue home meds    #RA/OA  - continue home meds    #DVT ppx: lovenox  #Diet: DASH/TLC  #Activity: WBAT  #Code Status: Full Code    Discussion of discharge plan of care, including discharge diagnoses, medication reconciliation, and follow-ups was conducted with Dr. Garcia on 10/17/2024, and discharge was approved.       HPI:  Patient is an 84 year old female with PMH of HTN, HL, PAD s/p cauterization, RA/OA on methotrexate, CKD, recent spinal fracture s/p rehab at [facility] (discharged on 10/3) presenting for left hip pain. She was waiting on an outpatient ortho appointment for evaluation of hip replacement when she had sudden worsening pain while ambulating via walker to the bathroom. She heard a crack in her left hip and sat in a chair, no longer able to ambulate. Denies fall, LOC. Able to complete ADLs. Pain radiates to the left groin, 10/10 at baseline, 3/10 after 5mg percocet.    ROS positive for hip pain, negative for all other systems.    In the ED:  Vitals stable  Labs: Hgb 9 (baseline), WBC 11.4  Xray: L inferior pubic ramus fracture and severe degenerative changes, R total arthroplasty    Admitted for pain management (16 Oct 2024 00:31)    Hospital Course:    Patient has been hemodynamically stable. Pain controlled with oxycodone. Seen by ortho and no surgical intervention planned. As per physiatry will need short term rehab at SNF. Patient is ready for discharge.    A/P:    #L Pubic Ramus fracture  - Xray pelvis: left pubic ramus fracture with severe degenerative changes of the left hip  - Continue Oxycodone for moderate pain PRN  - As per Physiatry will need Short Term Rehab at SNF  - As per Ortho; no acute surgical intervention  - Calcium / Vitamin D    #CAD  - continue aspirin    #HTN  - continue home meds    #RA/OA  - continue home meds    #DVT ppx: lovenox  #Diet: DASH/TLC  #Activity: WBAT  #Code Status: Full Code    Discussion of discharge plan of care, including discharge diagnoses, medication reconciliation, and follow-ups was conducted with Dr. Betts on 10/17/2024, and discharge was approved.       HPI:  Patient is an 84 year old female with PMH of HTN, HL, PAD s/p cauterization, RA/OA on methotrexate, CKD, recent spinal fracture s/p rehab at [facility] (discharged on 10/3) presenting for left hip pain. She was waiting on an outpatient ortho appointment for evaluation of hip replacement when she had sudden worsening pain while ambulating via walker to the bathroom. She heard a crack in her left hip and sat in a chair, no longer able to ambulate. Denies fall, LOC. Able to complete ADLs. Pain radiates to the left groin, 10/10 at baseline, 3/10 after 5mg percocet.    ROS positive for hip pain, negative for all other systems.    In the ED:  Vitals stable  Labs: Hgb 9 (baseline), WBC 11.4  Xray: L inferior pubic ramus fracture and severe degenerative changes, R total arthroplasty    Admitted for pain management (16 Oct 2024 00:31)    Hospital Course:    Patient has been hemodynamically stable. Pain controlled with oxycodone. Seen by ortho and no surgical intervention planned. As per physiatry will need short term rehab at [facility]. Patient is ready for discharge.    A/P:    #L Pubic Ramus fracture likely due to severe Osteoporosis.   - Xray pelvis: left pubic ramus fracture with severe degenerative changes of the left hip  - Continue Oxycodone for moderate pain PRN  - As per Ortho; no acute surgical intervention  - Calcium / Vitamin D    #CAD  - continue aspirin    #HTN  - continue home meds    #RA/OA  - continue home meds    Macrocytic anemia:   possibly from chronic disease due to RA   Check B12 and folate , TSH was normal.   Osteoporosis: seen by endo, recommended to start on Alendronate,     #DVT ppx: lovenox  #Diet: DASH/TLC  #Activity: WBAT  #Code Status: Full Code    Discussion of discharge plan of care, including discharge diagnoses, medication reconciliation, and follow-ups was conducted with Dr. Betts on 10/18/2024, and discharge was approved.

## 2024-10-17 NOTE — DISCHARGE NOTE PROVIDER - CARE PROVIDERS DIRECT ADDRESSES
,DirectAddress_Unknown ,DirectAddress_Unknown,[email or web address]

## 2024-10-17 NOTE — PROGRESS NOTE ADULT - ATTENDING COMMENTS
84 year old female with PMH of HTN, HL, PAD s/p cauterization, RA/OA on methotrexate, CKD, recent spinal fracture s/p rehab at [facility] (discharged on 10/3) presenting for left hip pain. She was waiting on an outpatient ortho appointment for evaluation of hip replacement when she had sudden worsening pain while ambulating via walker to the bathroom. She heard a crack in her left hip and sat in a chair, no longer able to ambulate. Denies fall, LOC. Able to complete ADLs. Pain radiates to the left groin, 10/10 at baseline, 3/10 after 5mg percocet.    Impression:    #L pubic ramus fracture  - Xray pelvis: left pubic ramus fracture with severe degenerative changes of the left hip  Plan:  - pain management: Tylenol ATC. Can use low dose Oxycodone PRN for pain control.   - PT evaluation, OOBTC, WBAT w/ walker.   - ortho evaluation noted: No acute surgical intervention. outpatient f/u with Dr. Velasquez. Eventual evaluation for left hip arthroplasty.  - Physiatry Eval: STR in SNF.   - fall precautions  - Calcium/vitamin D: Resume  Calcium carbonate 400mg TID w/ food, home dose vitamin D.   - Family insists she has had multiplefractures from minimal falls. Outpatient Rheumatologist wanted her to get an endocrine eval. family says she is too weak to be brought to doctor's offices and hence want endocrine eval inpatient to see what more can be done for her osteoporosis.   Outpatient Rheumatologist had advised she starts Prolia infusions (Denosumab    #CAD  - continue aspirin    #HTN  - continue home meds    #RA/OA  - continue home meds    #DVT ppx: lovenox  #Diet: DASH/TLC  #Activity: WBAT  #Code Status: full code  #Dispo: working on getting SNF. 84 year old female with PMH of HTN, HL, PAD s/p cauterization, RA/OA on methotrexate, CKD, recent spinal fracture s/p rehab at [facility] (discharged on 10/3) presenting for left hip pain. She was waiting on an outpatient ortho appointment for evaluation of hip replacement when she had sudden worsening pain while ambulating via walker to the bathroom. She heard a crack in her left hip and sat in a chair, no longer able to ambulate. Denies fall, LOC. Able to complete ADLs. Pain radiates to the left groin, 10/10 at baseline, 3/10 after 5mg percocet.    Impression:    #L pubic ramus fracture  - Xray pelvis: left pubic ramus fracture with severe degenerative changes of the left hip  Plan:  - pain management: Tylenol ATC. Can use low dose Oxycodone PRN for pain control.   - PT evaluation, OOBTC, WBAT w/ walker.   - ortho evaluation noted: No acute surgical intervention. outpatient f/u with Dr. Velasquez. Eventual evaluation for left hip arthroplasty.  - Physiatry Eval: STR in SNF.   - fall precautions  - Calcium/vitamin D: Resume  Calcium carbonate 400mg TID w/ food, home dose vitamin D.   - Family insists she has had multiple fractures from minimal falls. Outpatient Rheumatologist wanted her to get an endocrine eval. family says she is too weak to be brought to doctor's offices and hence want endocrine eval inpatient to see what more can be done for her osteoporosis.   Outpatient Rheumatologist had advised she starts Prolia infusions (Denosumab). Since it's hard for her to get to infusion centers, family is asking if she can get it here. Housestaff is following up with pharmacy.     #CAD  - continue aspirin    #HTN  - continue home meds    #RA/OA  - continue home meds    #DVT ppx: lovenox  #Diet: DASH/TLC  #Activity: WBAT  #Code Status: full code  #Dispo: working on getting SNF. Endocrine Eval. try to get her Prolia infusion here. Otherwise outpatient.

## 2024-10-17 NOTE — DISCHARGE NOTE PROVIDER - PROVIDER TOKENS
PROVIDER:[TOKEN:[[id number]],FOLLOWUP:[Routine]] PROVIDER:[TOKEN:[[id number]],FOLLOWUP:[Routine]],PROVIDER:[TOKEN:[[id number]],FOLLOWUP:[Routine]]

## 2024-10-17 NOTE — DISCHARGE NOTE PROVIDER - NSDCFUSCHEDAPPT_GEN_ALL_CORE_FT
Clarisse Garner  [facility]  NEUROSURG [address] [facility]  Scheduled Appointment: 10/21/2024

## 2024-10-17 NOTE — DISCHARGE NOTE PROVIDER - NSDCMRMEDTOKEN_GEN_ALL_CORE_FT
Aspir 81 oral delayed release tablet: 1 tab(s) orally once a day  atorvastatin 80 mg oral tablet: 1 tab(s) orally once a day (at bedtime)  escitalopram 10 mg oral tablet: 1 tab(s) orally once a day  folic acid 1 mg oral tablet: 2 tab(s) orally once a day  lidocaine 4% topical film: Apply topically to affected area once a day  losartan-hydrochlorothiazide 100 mg-25 mg oral tablet: 1 tab(s) orally once a day Restart when BP &gt; 140/90, otherwise continue to hold  melatonin 3 mg oral tablet: 2 tab(s) orally once a day (at bedtime) As needed Insomnia  methocarbamol 500 mg oral tablet: 1 tab(s) orally once a day (at bedtime) as needed for  moderate pain  methotrexate 2.5 mg oral tablet: 8 tab(s) orally once a week every Tuesday, 4 tabs in AM and 4 tabs in PM  metoprolol tartrate 25 mg oral tablet: 1 tab(s) orally 2 times a day  oxyCODONE 5 mg oral tablet: 1 tab(s) orally every 6 hours As needed Moderate Pain (4 - 6)  Vitamin B12 1000 mcg oral tablet: 1 tab(s) orally once a day  Vitamin D3 125 mcg (5000 intl units) oral tablet: 1 tab(s) orally once a day   alendronate 10 mg oral tablet: 1 tab(s) orally once a day  Aspir 81 oral delayed release tablet: 1 tab(s) orally once a day  atorvastatin 80 mg oral tablet: 1 tab(s) orally once a day (at bedtime)  escitalopram 10 mg oral tablet: 1 tab(s) orally once a day  folic acid 1 mg oral tablet: 2 tab(s) orally once a day  lidocaine 4% topical film: Apply topically to affected area once a day  losartan-hydrochlorothiazide 100 mg-25 mg oral tablet: 1 tab(s) orally once a day Restart when BP &gt; 140/90, otherwise continue to hold  melatonin 3 mg oral tablet: 2 tab(s) orally once a day (at bedtime) As needed Insomnia  methocarbamol 500 mg oral tablet: 1 tab(s) orally once a day (at bedtime) as needed for  moderate pain  methotrexate 2.5 mg oral tablet: 8 tab(s) orally once a week every Tuesday, 4 tabs in AM and 4 tabs in PM  metoprolol tartrate 25 mg oral tablet: 1 tab(s) orally 2 times a day  oxyCODONE 5 mg oral tablet: 1 tab(s) orally every 6 hours As needed Moderate Pain (4 - 6)  Vitamin B12 1000 mcg oral tablet: 1 tab(s) orally once a day  Vitamin D3 125 mcg (5000 intl units) oral tablet: 1 tab(s) orally once a day

## 2024-10-18 ENCOUNTER — TRANSCRIPTION ENCOUNTER (OUTPATIENT)
Age: 84
End: 2024-10-18

## 2024-10-18 VITALS — WEIGHT: 149.91 LBS | HEIGHT: 65 IN

## 2024-10-18 LAB
ANION GAP SERPL CALC-SCNC: 9 MMOL/L — SIGNIFICANT CHANGE UP (ref 7–14)
BUN SERPL-MCNC: 31 MG/DL — HIGH (ref 10–20)
CALCIUM SERPL-MCNC: 8.5 MG/DL — SIGNIFICANT CHANGE UP (ref 8.4–10.5)
CHLORIDE SERPL-SCNC: 105 MMOL/L — SIGNIFICANT CHANGE UP (ref 98–110)
CO2 SERPL-SCNC: 27 MMOL/L — SIGNIFICANT CHANGE UP (ref 17–32)
CREAT SERPL-MCNC: 1.1 MG/DL — SIGNIFICANT CHANGE UP (ref 0.7–1.5)
EGFR: 50 ML/MIN/1.73M2 — LOW
GLUCOSE SERPL-MCNC: 95 MG/DL — SIGNIFICANT CHANGE UP (ref 70–99)
HCT VFR BLD CALC: 29.4 % — LOW (ref 37–47)
HGB BLD-MCNC: 8.9 G/DL — LOW (ref 12–16)
IRON SATN MFR SERPL: 15 % — SIGNIFICANT CHANGE UP (ref 15–50)
IRON SATN MFR SERPL: 25 UG/DL — LOW (ref 35–150)
MCHC RBC-ENTMCNC: 30.3 G/DL — LOW (ref 32–37)
MCHC RBC-ENTMCNC: 31.7 PG — HIGH (ref 27–31)
MCV RBC AUTO: 104.6 FL — HIGH (ref 81–99)
NRBC # BLD: 0 /100 WBCS — SIGNIFICANT CHANGE UP (ref 0–0)
PLATELET # BLD AUTO: 360 K/UL — SIGNIFICANT CHANGE UP (ref 130–400)
PMV BLD: 10 FL — SIGNIFICANT CHANGE UP (ref 7.4–10.4)
POTASSIUM SERPL-MCNC: 4.3 MMOL/L — SIGNIFICANT CHANGE UP (ref 3.5–5)
POTASSIUM SERPL-SCNC: 4.3 MMOL/L — SIGNIFICANT CHANGE UP (ref 3.5–5)
RBC # BLD: 2.8 M/UL — LOW (ref 4.2–5.4)
RBC # BLD: 2.81 M/UL — LOW (ref 4.2–5.4)
RBC # FLD: 15.6 % — HIGH (ref 11.5–14.5)
RETICS #: 40 K/UL — SIGNIFICANT CHANGE UP (ref 25–125)
RETICS/RBC NFR: 1.4 % — SIGNIFICANT CHANGE UP (ref 0.5–1.5)
SODIUM SERPL-SCNC: 141 MMOL/L — SIGNIFICANT CHANGE UP (ref 135–146)
TIBC SERPL-MCNC: 165 UG/DL — LOW (ref 220–430)
UIBC SERPL-MCNC: 140 UG/DL — SIGNIFICANT CHANGE UP (ref 110–370)
WBC # BLD: 6.92 K/UL — SIGNIFICANT CHANGE UP (ref 4.8–10.8)
WBC # FLD AUTO: 6.92 K/UL — SIGNIFICANT CHANGE UP (ref 4.8–10.8)

## 2024-10-18 PROCEDURE — 99239 HOSP IP/OBS DSCHRG MGMT >30: CPT

## 2024-10-18 RX ORDER — ALENDRONATE SODIUM 70 MG/1
1 TABLET ORAL
Qty: 30 | Refills: 2
Start: 2024-10-18 | End: 2025-01-15

## 2024-10-18 RX ADMIN — Medication 1 TABLET(S): at 11:55

## 2024-10-18 RX ADMIN — Medication 975 MILLIGRAM(S): at 06:22

## 2024-10-18 RX ADMIN — Medication 975 MILLIGRAM(S): at 13:56

## 2024-10-18 RX ADMIN — Medication 10 MILLIGRAM(S): at 11:56

## 2024-10-18 RX ADMIN — Medication 1000 MICROGRAM(S): at 11:56

## 2024-10-18 RX ADMIN — Medication 25 MILLIGRAM(S): at 06:22

## 2024-10-18 RX ADMIN — Medication 81 MILLIGRAM(S): at 11:55

## 2024-10-18 RX ADMIN — OXYCODONE HYDROCHLORIDE 5 MILLIGRAM(S): 30 TABLET, FILM COATED, EXTENDED RELEASE ORAL at 11:55

## 2024-10-18 RX ADMIN — FOLIC ACID 1 MILLIGRAM(S): 1 TABLET ORAL at 11:55

## 2024-10-18 RX ADMIN — ENOXAPARIN SODIUM 40 MILLIGRAM(S): 150 INJECTION SUBCUTANEOUS at 04:46

## 2024-10-18 NOTE — DIETITIAN INITIAL EVALUATION ADULT - ORAL INTAKE PTA/DIET HISTORY
Prior to admission patient reports decreased appetite past 3-4 weeks, consuming 50% of 3 meals/day and 100% of snacks between meals. Patient follows a low salt diet. NKFA. No Adventism or cultural food preferences. Patient take vitamine D, C, B12 at home. Patient reports UBW is 70kg, CBW 68kg, weight loss of 3% in 1 month. Patient does not meet weight criteria for malnutrition.    Presently, patient reports her appetite is the same moderate. Patient eats small portions r/t IBS, consuming ~50% of meals since admission. RD encourage patient to eat small frequent meals through the day if more tolerable. Patient reports no chew/swallow difficulty. Patient has natural teeth. Last BM 10/16. Patient confirms drinking water throughout the day.

## 2024-10-18 NOTE — DISCHARGE NOTE NURSING/CASE MANAGEMENT/SOCIAL WORK - PATIENT PORTAL LINK FT
You can access the FollowMyHealth Patient Portal offered by [facility] by registering at the following website: [email or web address]. By joining [employer]’s FollowMyHealth portal, you will also be able to view your health information using other applications (apps) compatible with our system.

## 2024-10-18 NOTE — PROGRESS NOTE ADULT - SUBJECTIVE AND OBJECTIVE BOX
SUBJECTIVE/OVERNIGHT EVENTS  Today is hospital day 2d. This morning patient was seen and examined at bedside, resting comfortably in bed. No acute or major events overnight.    MEDICATIONS  STANDING MEDICATIONS  acetaminophen     Tablet .. 975 milliGRAM(s) Oral every 8 hours  aspirin enteric coated 81 milliGRAM(s) Oral daily  atorvastatin 80 milliGRAM(s) Oral at bedtime  cyanocobalamin 1000 MICROGram(s) Oral daily  enoxaparin Injectable 40 milliGRAM(s) SubCutaneous every 24 hours  escitalopram 10 milliGRAM(s) Oral daily  folic acid 1 milliGRAM(s) Oral daily  influenza  Vaccine (HIGH DOSE) 0.5 milliLiter(s) IntraMuscular once  metoprolol tartrate 25 milliGRAM(s) Oral two times a day    PRN MEDICATIONS  melatonin 6 milliGRAM(s) Oral at bedtime PRN  oxyCODONE    IR 5 milliGRAM(s) Oral every 6 hours PRN    VITALS  T(F): 98.5 (10-17-24 @ 07:30), Max: 98.5 (10-17-24 @ 07:30)  HR: 63 (10-17-24 @ 07:30) (62 - 74)  BP: 130/65 (10-17-24 @ 07:30) (130/65 - 151/78)  RR: 18 (10-16-24 @ 23:21) (17 - 18)  SpO2: 94% (10-17-24 @ 07:30) (94% - 97%)    PHYSICAL EXAM  GENERAL: NAD  HEART: RRR  LUNGS: Normal b/l air entry  ABDOMEN: NT/ND, Soft, +ve BS x 4  EXTREMITIES: No edema, strong peripheral pulses  NERVOUS SYSTEM: AO3  SKIN: Warm and dry, intact    LABS             9.0    7.86  )-----------( 304      ( 10-17-24 @ 06:23 )             29.5     140  |  103  |  26  -------------------------<  89   10-17-24 @ 06:23  4.6  |  28  |  1.1    Ca      8.5     10-17-24 @ 06:23  Mg     1.9     10-16-24 @ 11:45    TPro  5.7  /  Alb  3.1  /  TBili  0.4  /  DBili  x   /  AST  14  /  ALT  11  /  AlkPhos  123  /  GGT  x     10-16-24 @ 11:45    PT/INR - ( 10-15-24 @ 23:08 )   PT: 11.50 sec;   INR: 1.01 ratio  PTT - ( 10-15-24 @ 23:08 )  PTT:30.8 sec      Urinalysis Basic - ( 17 Oct 2024 06:23 )    Color: x / Appearance: x / SG: x / pH: x  Gluc: 89 mg/dL / Ketone: x  / Bili: x / Urobili: x   Blood: x / Protein: x / Nitrite: x   Leuk Esterase: x / RBC: x / WBC x   Sq Epi: x / Non Sq Epi: x / Bacteria: x          IMAGING
SUBJECTIVE/OVERNIGHT EVENTS  Today is hospital day 1d. This morning patient was seen and examined at bedside, resting comfortably in bed. No acute or major events overnight.    MEDICATIONS  STANDING MEDICATIONS  acetaminophen     Tablet .. 975 milliGRAM(s) Oral every 8 hours  aspirin enteric coated 81 milliGRAM(s) Oral daily  atorvastatin 80 milliGRAM(s) Oral at bedtime  cyanocobalamin 1000 MICROGram(s) Oral daily  enoxaparin Injectable 40 milliGRAM(s) SubCutaneous every 24 hours  escitalopram 10 milliGRAM(s) Oral daily  folic acid 1 milliGRAM(s) Oral daily  influenza  Vaccine (HIGH DOSE) 0.5 milliLiter(s) IntraMuscular once  metoprolol tartrate 25 milliGRAM(s) Oral two times a day    PRN MEDICATIONS  melatonin 6 milliGRAM(s) Oral at bedtime PRN  oxyCODONE    IR 5 milliGRAM(s) Oral every 6 hours PRN    VITALS  T(F): 98.5 (10-17-24 @ 07:30), Max: 98.5 (10-17-24 @ 07:30)  HR: 63 (10-17-24 @ 07:30) (62 - 74)  BP: 130/65 (10-17-24 @ 07:30) (130/65 - 151/78)  RR: 18 (10-16-24 @ 23:21) (17 - 18)  SpO2: 94% (10-17-24 @ 07:30) (94% - 97%)    PHYSICAL EXAM  GENERAL: NAD  HEART: RRR  LUNGS: Normal b/l air entry  ABDOMEN: NT/ND, Soft, +ve BS x 4  EXTREMITIES: No edema, strong peripheral pulses  NERVOUS SYSTEM: AO3  SKIN: Warm and dry, intact    LABS             9.0    7.86  )-----------( 304      ( 10-17-24 @ 06:23 )             29.5     140  |  103  |  26  -------------------------<  89   10-17-24 @ 06:23  4.6  |  28  |  1.1    Ca      8.5     10-17-24 @ 06:23  Mg     1.9     10-16-24 @ 11:45    TPro  5.7  /  Alb  3.1  /  TBili  0.4  /  DBili  x   /  AST  14  /  ALT  11  /  AlkPhos  123  /  GGT  x     10-16-24 @ 11:45    PT/INR - ( 10-15-24 @ 23:08 )   PT: 11.50 sec;   INR: 1.01 ratio  PTT - ( 10-15-24 @ 23:08 )  PTT:30.8 sec      Urinalysis Basic - ( 17 Oct 2024 06:23 )    Color: x / Appearance: x / SG: x / pH: x  Gluc: 89 mg/dL / Ketone: x  / Bili: x / Urobili: x   Blood: x / Protein: x / Nitrite: x   Leuk Esterase: x / RBC: x / WBC x   Sq Epi: x / Non Sq Epi: x / Bacteria: x          IMAGING

## 2024-10-18 NOTE — DIETITIAN INITIAL EVALUATION ADULT - OTHER CALCULATIONS
Energy needs calculated w consideration for acuity of illness, weight status, age, skeletal trauma, skin integrity  MSJ 1142 +1.4% = 1598 kcal

## 2024-10-18 NOTE — CONSULT NOTE ADULT - ATTENDING COMMENTS
lc1 pelvic fracture  wbat  rehab  fu as outpt
83 y/o F w/ PMHx of HTN, HL, PAD s/p cauterization, RA/OA on methotrexate, CKD, recent spinal fracture s/p rehab at [facility] (discharged on 10/3) presenting for L hip pain. Admitted w/ L pubic ramus fracture. Endocrinology consulted for osteoporosis w/u. Essentially Ms. Shoemaker has a history of multiple fractures and follows rheumatology outpatient.  She was advised to start Prolia but states that she has difficulty getting to the infusion center    #Osteoporosis  -Has a history of multiple fractures with most recent fractures noted to be left pubic ramus fracture  -Briefly remembers being on Fosamax  -Would be a good candidate for Prolia overall as discussed by her rheumatologist patient, explained the same to the patient  -She prefers to start something in the interim, can restart Fosamax 70 mg once a week explained that it has to be taken on an empty stomach to stay upright for 30 minutes after taking it  -Explained that Fosamax is renally excreted her kidney function currently is back at baseline but if it declines further would need to be discontinued  -Could also be a candidate for anabolic agents but will need outpatient follow-up for the same  - maintain Vitamin D >30 currently at goal

## 2024-10-18 NOTE — DIETITIAN INITIAL EVALUATION ADULT - PERSON TAUGHT/METHOD
Encouraged patient to eat small frequent meals since patient does not like to eat large meals r/t IBS. Encouraged patient to work w an outpatient Dietitian for osteoporosis and IBS./verbal instruction/patient instructed

## 2024-10-18 NOTE — DIETITIAN INITIAL EVALUATION ADULT - EDUCATION DIETARY MODIFICATIONS
(2) meets goals/outcomes/verbalization Patient reports her appetite decreases due to fear of fractures when ambulating in and outside of home. RD encourage patient to work with a Dietitian to help manage her nutritional needs r/t to osteoporosis./(2) meets goals/outcomes/verbalization Patient reports her appetite decreases due to fear of fractures when ambulating in and outside of home. RD encouraged patient to work with a Dietitian to help her manage nutritional requirements r/t to osteoporosis, CKD, IBS./(2) meets goals/outcomes/verbalization

## 2024-10-18 NOTE — CONSULT NOTE ADULT - SUBJECTIVE AND OBJECTIVE BOX
HPI:  Patient is an 84 year old female with PMH of HTN, HL, PAD s/p cauterization, RA/OA on methotrexate, CKD, recent spinal fracture s/p rehab at [facility] (discharged on 10/3) presenting for left hip pain. She was waiting on an outpatient ortho appointment for evaluation of hip replacement when she had sudden worsening pain while ambulating via walker to the bathroom. She heard a crack in her left hip and sat in a chair, no longer able to ambulate. Denies fall, LOC. Able to complete ADLs. Pain radiates to the left groin, 10/10 at baseline, 3/10 after 5mg percocet.    ROS positive for hip pain, negative for all other systems.    In the ED:  Vitals stable  Labs: Hgb 9 (baseline), WBC 11.4  Xray: L inferior pubic ramus fracture and severe degenerative changes, R total arthroplasty    Admitted for pain management (16 Oct 2024 00:31)      PAST MEDICAL & SURGICAL HISTORY  Hypertension    History of hip surgery    H/O knee surgery    Other irritable bowel syndrome    Mitral valve disorder    Arthritis    Basal cell carcinoma    CAD (coronary artery disease)    Chronic kidney disease (CKD)    PUD (peptic ulcer disease)    History of surgery  BILATERAL KNEE REPLACEMENT,    RIGHT HIP REPLACEMENT,  MARY IN RIGHT FEMUR,  CARDIAC STENTS X2,   CHOLECYSTECTOMY, right IOL    S/P total right hip arthroplasty        FAMILY HISTORY:  FAMILY HISTORY:      SOCIAL HISTORY:  []smoker  []Alcohol  []Drug    ALLERGIES:  penicillin (Rash; Anaphylaxis; Hives)      MEDICATIONS:  MEDICATIONS  (STANDING):  acetaminophen     Tablet .. 975 milliGRAM(s) Oral every 8 hours  aspirin enteric coated 81 milliGRAM(s) Oral daily  atorvastatin 80 milliGRAM(s) Oral at bedtime  calcium carbonate 1250 mG  + Vitamin D (OsCal 500 + D) 1 Tablet(s) Oral daily  cyanocobalamin 1000 MICROGram(s) Oral daily  enoxaparin Injectable 40 milliGRAM(s) SubCutaneous every 24 hours  escitalopram 10 milliGRAM(s) Oral daily  folic acid 1 milliGRAM(s) Oral daily  influenza  Vaccine (HIGH DOSE) 0.5 milliLiter(s) IntraMuscular once  metoprolol tartrate 25 milliGRAM(s) Oral two times a day    MEDICATIONS  (PRN):  melatonin 6 milliGRAM(s) Oral at bedtime PRN Insomnia  oxyCODONE    IR 5 milliGRAM(s) Oral every 6 hours PRN Moderate Pain (4 - 6)      HOME MEDICATIONS:  Home Medications:  Aspir 81 oral delayed release tablet: 1 tab(s) orally once a day (30 Mar 2023 07:10)  atorvastatin 80 mg oral tablet: 1 tab(s) orally once a day (at bedtime) (30 Mar 2023 07:10)  escitalopram 10 mg oral tablet: 1 tab(s) orally once a day (30 Mar 2023 07:10)  folic acid 1 mg oral tablet: 2 tab(s) orally once a day (01 Oct 2024 12:24)  losartan-hydrochlorothiazide 100 mg-25 mg oral tablet: 1 tab(s) orally once a day Restart when BP &gt; 140/90, otherwise continue to hold (03 Oct 2024 13:33)  melatonin 3 mg oral tablet: 2 tab(s) orally once a day (at bedtime) As needed Insomnia (17 Oct 2024 14:38)  methotrexate 2.5 mg oral tablet: 8 tab(s) orally once a week every Tuesday, 4 tabs in AM and 4 tabs in PM (01 Oct 2024 12:24)  metoprolol tartrate 25 mg oral tablet: 1 tab(s) orally 2 times a day (30 Mar 2023 07:10)  oxyCODONE 5 mg oral tablet: 1 tab(s) orally every 6 hours As needed Moderate Pain (4 - 6) (17 Oct 2024 14:38)  Vitamin B12 1000 mcg oral tablet: 1 tab(s) orally once a day (30 Mar 2023 07:10)  Vitamin D3 125 mcg (5000 intl units) oral tablet: 1 tab(s) orally once a day (30 Mar 2023 07:10)      VITALS:   T(F): 98 (10-18 @ 00:09), Max: 98.5 (10-17 @ 07:30)  HR: 72 (10-18 @ 05:34) (62 - 93)  BP: 146/78 (10-18 @ 05:34) (110/65 - 151/78)  BP(mean): --  RR: 18 (10-18 @ 00:09) (17 - 18)  SpO2: 95% (10-18 @ 00:09) (93% - 98%)    I&O's Summary      REVIEW OF SYSTEMS:  CONSTITUTIONAL: No weakness, fevers or chills  EYES: No visual changes  ENT: No vertigo or throat pain   NECK: No pain or stiffness  RESPIRATORY: No cough, wheezing, hemoptysis; No shortness of breath  CARDIOVASCULAR: No chest pain or palpitations  GASTROINTESTINAL: No abdominal or epigastric pain. No nausea, vomiting, or hematemesis; No diarrhea or constipation. No melena or hematochezia.  GENITOURINARY: No Polyuria  NEUROLOGICAL:  No tremors, no Weakness or numbness  SKIN: No itching, no rashes  MSK: no joint pain    PHYSICAL EXAM:  GENERAL: Patient is awake , alert and oriented,  not in acute distress  EYES: No proptosis, no lid lag  NECK: No thyroid enlargement, no palpable nodules , no bruit  LUNGS: Clear to auscultation bilaterally   CARDIOVASCULAR: S1/S2 present, RRR , no murmurs or rubs  ABD: Soft, non-tender, non-distended, +BS  EXT: No KEAGAN  SKIN: No abdominal striae  NEURO: No tremors, DTR 2+    LABS:                        9.0    7.86  )-----------( 304      ( 17 Oct 2024 06:23 )             29.5     10-17    140  |  103  |  26[H]  ----------------------------<  89  4.6   |  28  |  1.1    Ca    8.5      17 Oct 2024 06:23  Mg     1.9     10-16    TPro  5.7[L]  /  Alb  3.1[L]  /  TBili  0.4  /  DBili  x   /  AST  14  /  ALT  11  /  AlkPhos  123[H]  10-16                TSH 2.44; Total T3 --; Free T4 --   HPI:  Patient is an 84 year old female with PMH of HTN, HL, PAD s/p cauterization, RA/OA on methotrexate, CKD, recent spinal fracture s/p rehab at [facility] (discharged on 10/3) presenting for left hip pain. She was waiting on an outpatient ortho appointment for evaluation of hip replacement when she had sudden worsening pain while ambulating via walker to the bathroom. She heard a crack in her left hip and sat in a chair, no longer able to ambulate. Denies fall, LOC. Able to complete ADLs. Pain radiates to the left groin, 10/10 at baseline, 3/10 after 5mg percocet.    ROS positive for hip pain, negative for all other systems.    In the ED:  Vitals stable  Labs: Hgb 9 (baseline), WBC 11.4  Xray: L inferior pubic ramus fracture and severe degenerative changes, R total arthroplasty    Admitted for pain management (16 Oct 2024 00:31)      PAST MEDICAL & SURGICAL HISTORY  Hypertension    History of hip surgery    H/O knee surgery    Other irritable bowel syndrome    Mitral valve disorder    Arthritis    Basal cell carcinoma    CAD (coronary artery disease)    Chronic kidney disease (CKD)    PUD (peptic ulcer disease)    History of surgery  BILATERAL KNEE REPLACEMENT,    RIGHT HIP REPLACEMENT,  MARY IN RIGHT FEMUR,  CARDIAC STENTS X2,   CHOLECYSTECTOMY, right IOL    S/P total right hip arthroplasty        FAMILY HISTORY:  FAMILY HISTORY:      SOCIAL HISTORY:  []smoker  []Alcohol  []Drug    ALLERGIES:  penicillin (Rash; Anaphylaxis; Hives)      MEDICATIONS:  MEDICATIONS  (STANDING):  acetaminophen     Tablet .. 975 milliGRAM(s) Oral every 8 hours  aspirin enteric coated 81 milliGRAM(s) Oral daily  atorvastatin 80 milliGRAM(s) Oral at bedtime  calcium carbonate 1250 mG  + Vitamin D (OsCal 500 + D) 1 Tablet(s) Oral daily  cyanocobalamin 1000 MICROGram(s) Oral daily  enoxaparin Injectable 40 milliGRAM(s) SubCutaneous every 24 hours  escitalopram 10 milliGRAM(s) Oral daily  folic acid 1 milliGRAM(s) Oral daily  influenza  Vaccine (HIGH DOSE) 0.5 milliLiter(s) IntraMuscular once  metoprolol tartrate 25 milliGRAM(s) Oral two times a day    MEDICATIONS  (PRN):  melatonin 6 milliGRAM(s) Oral at bedtime PRN Insomnia  oxyCODONE    IR 5 milliGRAM(s) Oral every 6 hours PRN Moderate Pain (4 - 6)      HOME MEDICATIONS:  Home Medications:  Aspir 81 oral delayed release tablet: 1 tab(s) orally once a day (30 Mar 2023 07:10)  atorvastatin 80 mg oral tablet: 1 tab(s) orally once a day (at bedtime) (30 Mar 2023 07:10)  escitalopram 10 mg oral tablet: 1 tab(s) orally once a day (30 Mar 2023 07:10)  folic acid 1 mg oral tablet: 2 tab(s) orally once a day (01 Oct 2024 12:24)  losartan-hydrochlorothiazide 100 mg-25 mg oral tablet: 1 tab(s) orally once a day Restart when BP &gt; 140/90, otherwise continue to hold (03 Oct 2024 13:33)  melatonin 3 mg oral tablet: 2 tab(s) orally once a day (at bedtime) As needed Insomnia (17 Oct 2024 14:38)  methotrexate 2.5 mg oral tablet: 8 tab(s) orally once a week every Tuesday, 4 tabs in AM and 4 tabs in PM (01 Oct 2024 12:24)  metoprolol tartrate 25 mg oral tablet: 1 tab(s) orally 2 times a day (30 Mar 2023 07:10)  oxyCODONE 5 mg oral tablet: 1 tab(s) orally every 6 hours As needed Moderate Pain (4 - 6) (17 Oct 2024 14:38)  Vitamin B12 1000 mcg oral tablet: 1 tab(s) orally once a day (30 Mar 2023 07:10)  Vitamin D3 125 mcg (5000 intl units) oral tablet: 1 tab(s) orally once a day (30 Mar 2023 07:10)      VITALS:   T(F): 98 (10-18 @ 00:09), Max: 98.5 (10-17 @ 07:30)  HR: 72 (10-18 @ 05:34) (62 - 93)  BP: 146/78 (10-18 @ 05:34) (110/65 - 151/78)  BP(mean): --  RR: 18 (10-18 @ 00:09) (17 - 18)  SpO2: 95% (10-18 @ 00:09) (93% - 98%)    I&O's Summary      REVIEW OF SYSTEMS:  CONSTITUTIONAL: No weakness, fevers or chills  EYES: No visual changes  ENT: No vertigo or throat pain   NECK: No pain or stiffness  RESPIRATORY: No cough, wheezing, hemoptysis; No shortness of breath  CARDIOVASCULAR: No chest pain or palpitations  GASTROINTESTINAL: No abdominal or epigastric pain. No nausea, vomiting, or hematemesis; No diarrhea or constipation. No melena or hematochezia.  GENITOURINARY: No Polyuria  NEUROLOGICAL:  No tremors, no Weakness or numbness  SKIN: No itching, no rashes  MSK: no joint pain    PHYSICAL EXAM:  GENERAL: Patient is awake , alert and oriented,  not in acute distress  EYES: No proptosis, no lid lag  NECK: No thyroid enlargement, no palpable nodules , no bruit  LUNGS: Clear to auscultation bilaterally   CARDIOVASCULAR: S1/S2 present, RRR , no murmurs or rubs  ABD: Soft, non-tender, non-distended, +BS  EXT: No KEAGAN  SKIN: No abdominal striae  NEURO: No tremors, DTR 2+    LABS:                        9.0    7.86  )-----------( 304      ( 17 Oct 2024 06:23 )             29.5     10-17    140  |  103  |  26[H]  ----------------------------<  89  4.6   |  28  |  1.1    Ca    8.5      17 Oct 2024 06:23  Mg     1.9     10-16    TPro  5.7[L]  /  Alb  3.1[L]  /  TBili  0.4  /  DBili  x   /  AST  14  /  ALT  11  /  AlkPhos  123[H]  10-16        TSH 2.44; Total T3 --; Free T4 --   HPI:  Patient is an 84 year old female with PMH of HTN, HL, PAD s/p cauterization, RA/OA on methotrexate, CKD, recent spinal fracture s/p rehab at [facility] (discharged on 10/3) presenting for left hip pain. She was waiting on an outpatient ortho appointment for evaluation of hip replacement when she had sudden worsening pain while ambulating via walker to the bathroom. She heard a crack in her left hip and sat in a chair, no longer able to ambulate. Denies fall, LOC. Able to complete ADLs. Pain radiates to the left groin, 10/10 at baseline, 3/10 after 5mg percocet.    ROS positive for hip pain, negative for all other systems.    In the ED:  Vitals stable  Labs: Hgb 9 (baseline), WBC 11.4  Xray: L inferior pubic ramus fracture and severe degenerative changes, R total arthroplasty    Admitted for pain management (16 Oct 2024 00:31)      PAST MEDICAL & SURGICAL HISTORY  Hypertension    History of hip surgery    H/O knee surgery    Other irritable bowel syndrome    Mitral valve disorder    Arthritis    Basal cell carcinoma    CAD (coronary artery disease)    Chronic kidney disease (CKD)    PUD (peptic ulcer disease)    History of surgery  BILATERAL KNEE REPLACEMENT,    RIGHT HIP REPLACEMENT,  MARY IN RIGHT FEMUR,  CARDIAC STENTS X2,   CHOLECYSTECTOMY, right IOL    S/P total right hip arthroplasty      ALLERGIES:  penicillin (Rash; Anaphylaxis; Hives)      MEDICATIONS:  MEDICATIONS  (STANDING):  acetaminophen     Tablet .. 975 milliGRAM(s) Oral every 8 hours  aspirin enteric coated 81 milliGRAM(s) Oral daily  atorvastatin 80 milliGRAM(s) Oral at bedtime  calcium carbonate 1250 mG  + Vitamin D (OsCal 500 + D) 1 Tablet(s) Oral daily  cyanocobalamin 1000 MICROGram(s) Oral daily  enoxaparin Injectable 40 milliGRAM(s) SubCutaneous every 24 hours  escitalopram 10 milliGRAM(s) Oral daily  folic acid 1 milliGRAM(s) Oral daily  influenza  Vaccine (HIGH DOSE) 0.5 milliLiter(s) IntraMuscular once  metoprolol tartrate 25 milliGRAM(s) Oral two times a day    MEDICATIONS  (PRN):  melatonin 6 milliGRAM(s) Oral at bedtime PRN Insomnia  oxyCODONE    IR 5 milliGRAM(s) Oral every 6 hours PRN Moderate Pain (4 - 6)      HOME MEDICATIONS:  Home Medications:  Aspir 81 oral delayed release tablet: 1 tab(s) orally once a day (30 Mar 2023 07:10)  atorvastatin 80 mg oral tablet: 1 tab(s) orally once a day (at bedtime) (30 Mar 2023 07:10)  escitalopram 10 mg oral tablet: 1 tab(s) orally once a day (30 Mar 2023 07:10)  folic acid 1 mg oral tablet: 2 tab(s) orally once a day (01 Oct 2024 12:24)  losartan-hydrochlorothiazide 100 mg-25 mg oral tablet: 1 tab(s) orally once a day Restart when BP &gt; 140/90, otherwise continue to hold (03 Oct 2024 13:33)  melatonin 3 mg oral tablet: 2 tab(s) orally once a day (at bedtime) As needed Insomnia (17 Oct 2024 14:38)  methotrexate 2.5 mg oral tablet: 8 tab(s) orally once a week every Tuesday, 4 tabs in AM and 4 tabs in PM (01 Oct 2024 12:24)  metoprolol tartrate 25 mg oral tablet: 1 tab(s) orally 2 times a day (30 Mar 2023 07:10)  oxyCODONE 5 mg oral tablet: 1 tab(s) orally every 6 hours As needed Moderate Pain (4 - 6) (17 Oct 2024 14:38)  Vitamin B12 1000 mcg oral tablet: 1 tab(s) orally once a day (30 Mar 2023 07:10)  Vitamin D3 125 mcg (5000 intl units) oral tablet: 1 tab(s) orally once a day (30 Mar 2023 07:10)      VITALS:   T(F): 98 (10-18 @ 00:09), Max: 98.5 (10-17 @ 07:30)  HR: 72 (10-18 @ 05:34) (62 - 93)  BP: 146/78 (10-18 @ 05:34) (110/65 - 151/78)  BP(mean): --  RR: 18 (10-18 @ 00:09) (17 - 18)  SpO2: 95% (10-18 @ 00:09) (93% - 98%)    I&O's Summary      REVIEW OF SYSTEMS:  CONSTITUTIONAL: No weakness, fevers or chills  EYES: No visual changes  ENT: No vertigo or throat pain   NECK: No pain or stiffness  RESPIRATORY: No cough, wheezing, hemoptysis; No shortness of breath  CARDIOVASCULAR: No chest pain or palpitations  GASTROINTESTINAL: No abdominal or epigastric pain. No nausea, vomiting, or hematemesis; No diarrhea or constipation. No melena or hematochezia.  GENITOURINARY: No Polyuria  NEUROLOGICAL:  No tremors, no Weakness or numbness  SKIN: No itching, no rashes  MSK: no joint pain    PHYSICAL EXAM:  GENERAL: Patient is awake , alert and oriented, not in acute distress  EYES: No proptosis, no lid lag  LUNGS: Clear to auscultation bilaterally   CARDIOVASCULAR: RRR  ABD: Soft, non-tender, non-distended  EXT: No KEAGAN  SKIN: No abdominal striae  NEURO: No tremors, DTR 2+    LABS:                        9.0    7.86  )-----------( 304      ( 17 Oct 2024 06:23 )             29.5     10-17    140  |  103  |  26[H]  ----------------------------<  89  4.6   |  28  |  1.1    Ca    8.5      17 Oct 2024 06:23  Mg     1.9     10-16    TPro  5.7[L]  /  Alb  3.1[L]  /  TBili  0.4  /  DBili  x   /  AST  14  /  ALT  11  /  AlkPhos  123[H]  10-16        TSH 2.44; Total T3 --; Free T4 --

## 2024-10-18 NOTE — DISCHARGE NOTE NURSING/CASE MANAGEMENT/SOCIAL WORK - FINANCIAL ASSISTANCE
[facility] provides services at a reduced cost to those who are determined to be eligible through [facility]’s financial assistance program. Information regarding [facility]’s financial assistance program can be found by going to [email or web address] or by calling [phone].

## 2024-10-18 NOTE — DIETITIAN INITIAL EVALUATION ADULT - PERTINENT LABORATORY DATA
10-18    141  |  105  |  31[H]  ----------------------------<  95  4.3   |  27  |  1.1    Ca    8.5      18 Oct 2024 07:51    A1C with Estimated Average Glucose Result: 5.9 % (10-01-24 @ 07:11)

## 2024-10-18 NOTE — PROGRESS NOTE ADULT - ASSESSMENT
Patient is an 84 year old female with PMH of HTN, HL, PAD s/p cauterization, RA/OA on methotrexate, CKD, recent spinal fracture s/p rehab at [facility] (discharged on 10/3) presenting for left hip pain. She was waiting on an outpatient ortho appointment for evaluation of hip replacement when she had sudden worsening pain while ambulating via walker to the bathroom. She heard a crack in her left hip and sat in a chair, no longer able to ambulate. Denies fall, LOC. Able to complete ADLs. Pain radiates to the left groin, 10/10 at baseline, 3/10 after 5mg percocet.    #L Pubic Ramus fracture  - Xray pelvis: left pubic ramus fracture with severe degenerative changes of the left hip  - Continue Oxycodone for moderate pain PRN  - As per Physiatry will need Short Term Rehab at [facility]  - As per Ortho; no acute surgical intervention  - Continue Calcium / Vitamin D  - Patient is on long-term methotrexate   - Pending Osteoporosis evaluation by endocrinology; opted for inpatient consult as the patient has poor mobility and she expressed difficulty in getting to outpatient appointments     #CAD  - continue aspirin    #HTN  - On Losartan/HCTZ 100/25    #RA/OA  - On Methotrexate     #DVT ppx: lovenox  #Diet: DASH/TLC  #Activity: WBAT  #Code Status: full code  #Dispo: Pending bed placement        
Patient is an 84 year old female with PMH of HTN, HL, PAD s/p cauterization, RA/OA on methotrexate, CKD, recent spinal fracture s/p rehab at [facility] (discharged on 10/3) presenting for left hip pain. She was waiting on an outpatient ortho appointment for evaluation of hip replacement when she had sudden worsening pain while ambulating via walker to the bathroom. She heard a crack in her left hip and sat in a chair, no longer able to ambulate. Denies fall, LOC. Able to complete ADLs. Pain radiates to the left groin, 10/10 at baseline, 3/10 after 5mg percocet.    #L Pubic Ramus fracture  - Xray pelvis: left pubic ramus fracture with severe degenerative changes of the left hip  - Continue Oxycodone for moderate pain PRN  - As per Physiatry will need Short Term Rehab at [facility]  - As per Ortho; no acute surgical intervention  - Calcium / Vitamin D    #CAD  - continue aspirin    #HTN  - continue home meds    #RA/OA  - continue home meds    #DVT ppx: lovenox  #Diet: DASH/TLC  #Activity: WBAT  #Code Status: full code  #Dispo: Pending bed placement

## 2024-10-18 NOTE — CONSULT NOTE ADULT - ASSESSMENT
ASSESSMENT & PLAN: ASSESSMENT & PLAN:    85 y/o F w/ PMHx of HTN, HL, PAD s/p cauterization, RA/OA on methotrexate, CKD, recent spinal fracture s/p rehab at [facility] (discharged on 10/3) presenting for L hip pain. Admitted w/ L pubic ramus fracture. Endocrinology consulted for osteoporosis w/u.       #Multiple fractures  #Osteopenia   - Vit D, 25-OH: 30 (9/30/24), Ca 2+ 8.5 (10/18/24), iPTH 48 (10/1/24)  - Per primary, outpatient Rheumatologist had advised she starts Prolia infusions (Denosumab). Since it's hard for her to get to infusion centers, family is asking if she can get it here.  - L Hip XR: Bones are diffusely osteopenic; Minimally displaced left inferior pubic ramus fracture; Stable right femoral hardware with total hip arthroplasty and lateral cortex plate and screw fixation, partially imaged. Stable moderate left hip osteoarthritis. Degenerative changes lumbosacral spine and bilateral sacroiliac joints.   ASSESSMENT & PLAN:    83 y/o F w/ PMHx of HTN, HL, PAD s/p cauterization, RA/OA on methotrexate, CKD, recent spinal fracture s/p rehab at [facility] (discharged on 10/3) presenting for L hip pain. Admitted w/ L pubic ramus fracture. Endocrinology consulted for osteoporosis w/u.     #Osteoporosis  #Hx of multiple fractures  - Vit D, 25-OH: 30 (9/30/24), Ca 2+ 8.5 (10/18/24), iPTH 48 (10/1/24)  - Per primary, outpatient Rheumatologist had advised she starts Prolia infusions (Denosumab). Since it's hard for her to get to infusion centers, family is asking if she can get it here.  - L Hip XR: Bones are diffusely osteopenic; Minimally displaced left inferior pubic ramus fracture; Stable right femoral hardware with total hip arthroplasty and lateral cortex plate and screw fixation, partially imaged. Stable moderate left hip osteoarthritis. Degenerative changes lumbosacral spine and bilateral sacroiliac joints.  - Follows w/ Rheumatologist Dr. Roselyn Hanna  Plan:  - Start PO fosamax 70 mg q1 week, while eGFR remains > 35; counseled on pill technique and adverse effects  - Follow-up w/ Rheumatologist OP Dr. Roselyn Hanna to start Prolia SC q6 month   ASSESSMENT & PLAN:    83 y/o F w/ PMHx of HTN, HL, PAD s/p cauterization, RA/OA on methotrexate, CKD, recent spinal fracture s/p rehab at [facility] (discharged on 10/3) presenting for L hip pain. Admitted w/ L pubic ramus fracture. Endocrinology consulted for osteoporosis w/u.     #Osteoporosis  #Hx of multiple fractures  - Diagnosed w/ osteoporosis > 1 year ago, states she has not been on medications  - Vit D, 25-OH: 30 (9/30/24), Ca 2+ 8.5 (10/18/24), iPTH 48 (10/1/24)  - Per primary, outpatient Rheumatologist had advised she starts Prolia infusions (Denosumab). Since it's hard for her to get to infusion centers, family is asking if she can get it here.  - L Hip XR: Bones are diffusely osteopenic; Minimally displaced left inferior pubic ramus fracture; Stable right femoral hardware with total hip arthroplasty and lateral cortex plate and screw fixation, partially imaged. Stable moderate left hip osteoarthritis. Degenerative changes lumbosacral spine and bilateral sacroiliac joints.  - Follows w/ Rheumatologist Dr. Roselyn Hanna- recommended to start Prolia but has not started  Plan:  - Start PO fosamax 70 mg q1 week in addition to calcium + vitamin D supplements, while eGFR remains > 35; counseled on pill technique and adverse effects  - Follow-up w/ Rheumatologist OP Dr. Roselyn Hanna to start Prolia SC q6 month

## 2024-10-18 NOTE — DIETITIAN INITIAL EVALUATION ADULT - PERTINENT MEDS FT
MEDICATIONS  (STANDING):  acetaminophen     Tablet .. 975 milliGRAM(s) Oral every 8 hours  aspirin enteric coated 81 milliGRAM(s) Oral daily  atorvastatin 80 milliGRAM(s) Oral at bedtime  calcium carbonate 1250 mG  + Vitamin D (OsCal 500 + D) 1 Tablet(s) Oral daily  cyanocobalamin 1000 MICROGram(s) Oral daily  enoxaparin Injectable 40 milliGRAM(s) SubCutaneous every 24 hours  escitalopram 10 milliGRAM(s) Oral daily  folic acid 1 milliGRAM(s) Oral daily  influenza  Vaccine (HIGH DOSE) 0.5 milliLiter(s) IntraMuscular once  metoprolol tartrate 25 milliGRAM(s) Oral two times a day    MEDICATIONS  (PRN):  melatonin 6 milliGRAM(s) Oral at bedtime PRN Insomnia  oxyCODONE    IR 5 milliGRAM(s) Oral every 6 hours PRN Moderate Pain (4 - 6)

## 2024-10-18 NOTE — DIETITIAN INITIAL EVALUATION ADULT - ADD RECOMMEND
Low Risk f/u 5-7 days   Interventions: meals, snacks, coordination of care  Monitoring/Evaluation: PO intake, Wt, labs, electrolytes,  NFPF  Recommendations:  1. Continue current diet order.  2. Encourage PO intake & assist PRN

## 2024-10-18 NOTE — DISCHARGE NOTE NURSING/CASE MANAGEMENT/SOCIAL WORK - NSDCPEFALRISK_GEN_ALL_CORE
For information on Fall & Injury Prevention, visit: [email or web address] OR  [email or web address] OR  https://www.cdc.gov/steadi/patient.html

## 2024-10-18 NOTE — DIETITIAN INITIAL EVALUATION ADULT - OTHER INFO
Patient is an 84 year old female with PMH of HTN, HL, PAD s/p cauterization, RA/OA on methotrexate, CKD, recent spinal fracture s/p rehab at [facility] (discharged on 10/3) presenting for left hip pain. She was waiting on an outpatient ortho appointment for evaluation of hip replacement when she had sudden worsening pain while ambulating via walker to the bathroom. She heard a crack in her left hip and sat in a chair, no longer able to ambulate.    #L Pubic Ramus fracture  - X-ray pelvis: left pubic ramus fracture with severe degenerative changes of the left hip  - As per Physiatry will need Short Term Rehab at [facility]  - As per Ortho; no acute surgical intervention  - Calcium / Vitamin D

## 2024-10-19 LAB
FERRITIN SERPL-MCNC: 240 NG/ML — SIGNIFICANT CHANGE UP (ref 13–330)
FOLATE SERPL-MCNC: >20 NG/ML — SIGNIFICANT CHANGE UP
VIT B12 SERPL-MCNC: 1003 PG/ML — SIGNIFICANT CHANGE UP (ref 232–1245)

## 2024-10-21 ENCOUNTER — APPOINTMENT (OUTPATIENT)
Dept: NEUROSURGERY | Facility: CLINIC | Age: 84
End: 2024-10-21

## 2024-10-28 NOTE — CDI QUERY NOTE - NSCDIOTHERTXTBX_GEN_ALL_CORE_HH
Clinical documentation and/or evidence of the patient’s presentation, evaluation, and medical management, as evidenced below, may support a diagnosis that is not documented in the medical record.  In order to ensure accurate coding and accuracy of the clinical record, the documentation in this patient’s medical record requires additional clarification.      If you think the supporting documentation and/or clinical evidence supports a more specific diagnosis, please include more specific documentation of a diagnosis associated with these findings in your progress note and/or discharge summary.      Please clarify this patient’s immune status:    • The patient is in an immunocompromised state associated with the use of methotrexate..  • Other (specify)    Supporting documentation and/or clinical evidence:   ** 10/16 H&P:       History of Present Illness:         Patient is an 84 year old female with PMH of ......., RA/OA on methotrexate         Home medications:           methotrexate 2.5 mg oral tablet: 8 tab(s) orally once a week every Tuesday, 4 tabs in AM and 4 tabs in PM    ** 10/17 Discharge Note:       HPI: .... RA/OA on methotrexate       Discharge medications:          methotrexate 2.5 mg oral tablet: 8 tab(s) orally once a week every Tuesday, 4 tabs in AM and 4 tabs in PM Clinical documentation and/or evidence of the patient’s presentation, evaluation, and medical management, as evidenced below, may support a diagnosis that is not documented in the medical record.  In order to ensure accurate coding and accuracy of the clinical record, the documentation in this patient’s medical record requires additional clarification.    If you think the supporting documentation and/or clinical evidence supports a more specific diagnosis, please include more specific documentation of a diagnosis associated with these findings in your progress note and/or discharge summary.                                          ===============================================    Please clarify this patient’s immune status:    • The patient is in an immunocompromised state associated with the use of methotrexate..  • Other (specify)    Supporting documentation and/or clinical evidence:   ** 10/16 H&P:       History of Present Illness:         Patient is an 84 year old female with PMH of ......., RA/OA on methotrexate         Home medications:           methotrexate 2.5 mg oral tablet: 8 tab(s) orally once a week every Tuesday, 4 tabs in AM and 4 tabs in PM    ** 10/17 Discharge Note:       HPI: .... RA/OA on methotrexate       Discharge medications:          methotrexate 2.5 mg oral tablet: 8 tab(s) orally once a week every Tuesday, 4 tabs in AM and 4 tabs in PM

## 2024-11-19 ENCOUNTER — APPOINTMENT (OUTPATIENT)
Dept: ORTHOPEDIC SURGERY | Facility: CLINIC | Age: 84
End: 2024-11-19

## 2024-11-27 ENCOUNTER — APPOINTMENT (OUTPATIENT)
Dept: GERIATRICS | Facility: HOME HEALTH | Age: 84
End: 2024-11-27

## 2024-11-27 DIAGNOSIS — M81.0 AGE-RELATED OSTEOPOROSIS W/OUT CURRENT PATHOLOGICAL FRACTURE: ICD-10-CM

## 2024-11-27 DIAGNOSIS — E78.2 MIXED HYPERLIPIDEMIA: ICD-10-CM

## 2024-11-27 DIAGNOSIS — Z00.00 ENCOUNTER FOR GENERAL ADULT MEDICAL EXAMINATION W/OUT ABNORMAL FINDINGS: ICD-10-CM

## 2024-11-27 DIAGNOSIS — J00 ACUTE NASOPHARYNGITIS [COMMON COLD]: ICD-10-CM

## 2024-11-27 DIAGNOSIS — M06.9 RHEUMATOID ARTHRITIS, UNSPECIFIED: ICD-10-CM

## 2024-11-27 DIAGNOSIS — I25.10 ATHEROSCLEROTIC HEART DISEASE OF NATIVE CORONARY ARTERY W/OUT ANGINA PECTORIS: ICD-10-CM

## 2024-11-27 DIAGNOSIS — F51.01 PRIMARY INSOMNIA: ICD-10-CM

## 2024-11-27 DIAGNOSIS — I10 ESSENTIAL (PRIMARY) HYPERTENSION: ICD-10-CM

## 2024-11-27 PROCEDURE — 99347 HOME/RES VST EST SF MDM 20: CPT

## 2024-11-27 RX ORDER — BENZONATATE 100 MG/1
100 CAPSULE ORAL 3 TIMES DAILY
Qty: 15 | Refills: 0 | Status: ACTIVE | COMMUNITY
Start: 2024-11-27 | End: 1900-01-01

## 2024-11-28 VITALS
DIASTOLIC BLOOD PRESSURE: 75 MMHG | SYSTOLIC BLOOD PRESSURE: 140 MMHG | HEART RATE: 69 BPM | HEIGHT: 65 IN | OXYGEN SATURATION: 93 % | RESPIRATION RATE: 16 BRPM | WEIGHT: 155 LBS | BODY MASS INDEX: 25.83 KG/M2

## 2024-11-28 PROBLEM — F51.01 PRIMARY INSOMNIA: Status: ACTIVE | Noted: 2024-11-28

## 2024-11-28 PROBLEM — M81.0 AGE-RELATED OSTEOPOROSIS WITHOUT CURRENT PATHOLOGICAL FRACTURE: Status: ACTIVE | Noted: 2024-11-28

## 2024-11-28 RX ORDER — ESCITALOPRAM OXALATE 10 MG/1
10 TABLET ORAL
Refills: 0 | Status: ACTIVE | COMMUNITY

## 2024-11-28 RX ORDER — METHOTREXATE 2.5 MG/1
2.5 TABLET ORAL
Refills: 0 | Status: ACTIVE | COMMUNITY

## 2024-11-28 RX ORDER — CHROMIUM 200 MCG
TABLET ORAL
Refills: 0 | Status: ACTIVE | COMMUNITY

## 2024-11-28 RX ORDER — LOSARTAN POTASSIUM AND HYDROCHLOROTHIAZIDE 12.5; 5 MG/1; MG/1
50-12.5 TABLET ORAL
Refills: 0 | Status: ACTIVE | COMMUNITY

## 2025-01-18 ENCOUNTER — APPOINTMENT (OUTPATIENT)
Dept: GERIATRICS | Facility: HOME HEALTH | Age: 85
End: 2025-01-18
Payer: MEDICARE

## 2025-01-18 DIAGNOSIS — E78.2 MIXED HYPERLIPIDEMIA: ICD-10-CM

## 2025-01-18 DIAGNOSIS — I25.10 ATHEROSCLEROTIC HEART DISEASE OF NATIVE CORONARY ARTERY W/OUT ANGINA PECTORIS: ICD-10-CM

## 2025-01-18 DIAGNOSIS — M06.9 RHEUMATOID ARTHRITIS, UNSPECIFIED: ICD-10-CM

## 2025-01-18 DIAGNOSIS — M81.0 AGE-RELATED OSTEOPOROSIS W/OUT CURRENT PATHOLOGICAL FRACTURE: ICD-10-CM

## 2025-01-18 DIAGNOSIS — I10 ESSENTIAL (PRIMARY) HYPERTENSION: ICD-10-CM

## 2025-01-18 PROCEDURE — 99349 HOME/RES VST EST MOD MDM 40: CPT

## 2025-01-19 VITALS
RESPIRATION RATE: 16 BRPM | DIASTOLIC BLOOD PRESSURE: 70 MMHG | TEMPERATURE: 97.9 F | HEART RATE: 62 BPM | OXYGEN SATURATION: 92 % | SYSTOLIC BLOOD PRESSURE: 140 MMHG

## 2025-03-19 NOTE — DISCHARGE NOTE NURSING/CASE MANAGEMENT/SOCIAL WORK - NSDCPEFALRISK_GEN_ALL_CORE
Medication: lisinopril (ZESTRIL) 10 MG tablet   Last office visit date: 02/25/2025  Medication Refill Protocol Failed.  Failed criteria: switching to mail order. Sent to clinician to review.     Darline texted and  needs provider approval.   For information on Fall & Injury Prevention, visit: [email or web address] OR  [email or web address] OR  https://www.cdc.gov/steadi/patient.html

## 2025-04-07 PROBLEM — M25.511 ACUTE PAIN OF RIGHT SHOULDER: Status: ACTIVE | Noted: 2025-04-07

## 2025-04-07 PROBLEM — J30.2 SEASONAL ALLERGIES: Status: ACTIVE | Noted: 2025-04-07

## 2025-08-09 PROBLEM — N18.32 STAGE 3B CHRONIC KIDNEY DISEASE: Status: ACTIVE | Noted: 2025-04-07

## 2025-08-13 ENCOUNTER — APPOINTMENT (OUTPATIENT)
Dept: GERIATRICS | Facility: HOME HEALTH | Age: 85
End: 2025-08-13
Payer: MEDICARE

## 2025-08-13 VITALS
HEART RATE: 72 BPM | RESPIRATION RATE: 18 BRPM | TEMPERATURE: 97.2 F | SYSTOLIC BLOOD PRESSURE: 132 MMHG | OXYGEN SATURATION: 95 % | DIASTOLIC BLOOD PRESSURE: 62 MMHG

## 2025-08-13 DIAGNOSIS — M06.9 RHEUMATOID ARTHRITIS, UNSPECIFIED: ICD-10-CM

## 2025-08-13 DIAGNOSIS — I10 ESSENTIAL (PRIMARY) HYPERTENSION: ICD-10-CM

## 2025-08-13 DIAGNOSIS — N18.32 CHRONIC KIDNEY DISEASE, STAGE 3B: ICD-10-CM

## 2025-08-13 DIAGNOSIS — E78.2 MIXED HYPERLIPIDEMIA: ICD-10-CM

## 2025-08-13 DIAGNOSIS — R09.81 NASAL CONGESTION: ICD-10-CM

## 2025-08-13 DIAGNOSIS — I25.10 ATHEROSCLEROTIC HEART DISEASE OF NATIVE CORONARY ARTERY W/OUT ANGINA PECTORIS: ICD-10-CM

## 2025-08-13 PROCEDURE — 99349 HOME/RES VST EST MOD MDM 40: CPT

## 2025-08-13 RX ORDER — PNV NO.95/FERROUS FUM/FOLIC AC 28MG-0.8MG
100 TABLET ORAL
Refills: 0 | Status: ACTIVE | COMMUNITY

## 2025-08-13 RX ORDER — AMLODIPINE BESYLATE 10 MG/1
10 TABLET ORAL
Refills: 0 | Status: ACTIVE | COMMUNITY

## 2025-08-13 RX ORDER — VALSARTAN 320 MG/1
320 TABLET, COATED ORAL
Refills: 0 | Status: ACTIVE | COMMUNITY

## 2025-08-13 RX ORDER — FLUTICASONE PROPIONATE 50 UG/1
50 SPRAY NASAL DAILY
Qty: 1 | Refills: 3 | Status: ACTIVE | COMMUNITY
Start: 2025-08-13 | End: 1900-01-01

## 2025-08-13 RX ORDER — METOPROLOL TARTRATE 25 MG/1
25 TABLET ORAL
Refills: 0 | Status: ACTIVE | COMMUNITY

## 2025-08-29 ENCOUNTER — NON-APPOINTMENT (OUTPATIENT)
Age: 85
End: 2025-08-29

## 2025-09-03 PROBLEM — Z23 ENCOUNTER FOR VACCINATION: Status: ACTIVE | Noted: 2025-09-03

## 2025-09-04 ENCOUNTER — APPOINTMENT (OUTPATIENT)
Dept: GERIATRICS | Facility: HOME HEALTH | Age: 85
End: 2025-09-04
Payer: MEDICARE

## 2025-09-04 VITALS
SYSTOLIC BLOOD PRESSURE: 120 MMHG | OXYGEN SATURATION: 96 % | TEMPERATURE: 98.2 F | DIASTOLIC BLOOD PRESSURE: 62 MMHG | HEART RATE: 76 BPM | RESPIRATION RATE: 16 BRPM

## 2025-09-04 DIAGNOSIS — Z23 ENCOUNTER FOR IMMUNIZATION: ICD-10-CM

## 2025-09-04 DIAGNOSIS — I10 ESSENTIAL (PRIMARY) HYPERTENSION: ICD-10-CM

## 2025-09-04 DIAGNOSIS — G47.00 INSOMNIA, UNSPECIFIED: ICD-10-CM

## 2025-09-04 PROCEDURE — G0008: CPT

## 2025-09-04 PROCEDURE — 90658 IIV3 VACCINE SPLT 0.5 ML IM: CPT | Mod: NC

## 2025-09-04 PROCEDURE — 99349 HOME/RES VST EST MOD MDM 40: CPT | Mod: 25

## 2025-09-04 RX ORDER — TRAZODONE HYDROCHLORIDE 50 MG/1
50 TABLET ORAL
Qty: 90 | Refills: 3 | Status: ACTIVE | COMMUNITY
Start: 2025-09-04 | End: 1900-01-01